# Patient Record
Sex: MALE | Race: WHITE | NOT HISPANIC OR LATINO | Employment: OTHER | URBAN - METROPOLITAN AREA
[De-identification: names, ages, dates, MRNs, and addresses within clinical notes are randomized per-mention and may not be internally consistent; named-entity substitution may affect disease eponyms.]

---

## 2020-01-28 ENCOUNTER — HISTORICAL (OUTPATIENT)
Dept: LAB | Facility: HOSPITAL | Age: 85
End: 2020-01-28

## 2023-01-26 ENCOUNTER — HOSPITAL ENCOUNTER (EMERGENCY)
Facility: HOSPITAL | Age: 88
Discharge: HOME | End: 2023-01-26
Attending: EMERGENCY MEDICINE
Payer: OTHER GOVERNMENT

## 2023-01-26 VITALS
WEIGHT: 160 LBS | DIASTOLIC BLOOD PRESSURE: 95 MMHG | RESPIRATION RATE: 20 BRPM | BODY MASS INDEX: 24.25 KG/M2 | HEIGHT: 68 IN | TEMPERATURE: 98 F | SYSTOLIC BLOOD PRESSURE: 171 MMHG | OXYGEN SATURATION: 96 % | HEART RATE: 103 BPM

## 2023-01-26 DIAGNOSIS — K62.5 RECTAL BLEEDING: ICD-10-CM
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
## 2023-01-26 DIAGNOSIS — M11.20 PSEUDOGOUT: Primary | ICD-10-CM

## 2023-01-26 LAB
ALBUMIN SERPL-MCNC: 3.9 G/DL (ref 3.4–4.8)
ALBUMIN/GLOB SERPL: 1 RATIO (ref 1.1–2)
ALP SERPL-CCNC: 54 UNIT/L (ref 40–150)
ALT SERPL-CCNC: 14 UNIT/L (ref 0–55)
APPEARANCE UR: CLEAR
AST SERPL-CCNC: 16 UNIT/L (ref 5–34)
BASOPHILS # BLD AUTO: 0.03 X10(3)/MCL (ref 0–0.2)
BASOPHILS NFR BLD AUTO: 0.3 %
BILIRUB UR QL STRIP.AUTO: NEGATIVE MG/DL
BILIRUBIN DIRECT+TOT PNL SERPL-MCNC: 1.7 MG/DL
BUN SERPL-MCNC: 10.9 MG/DL (ref 8.4–25.7)
CALCIUM SERPL-MCNC: 9.2 MG/DL (ref 8.8–10)
CHLORIDE SERPL-SCNC: 102 MMOL/L (ref 98–107)
CO2 SERPL-SCNC: 26 MMOL/L (ref 23–31)
COLOR UR AUTO: ABNORMAL
CREAT SERPL-MCNC: 1.02 MG/DL (ref 0.73–1.18)
EOSINOPHIL # BLD AUTO: 0.08 X10(3)/MCL (ref 0–0.9)
EOSINOPHIL NFR BLD AUTO: 0.8 %
ERYTHROCYTE [DISTWIDTH] IN BLOOD BY AUTOMATED COUNT: 19 % (ref 11.5–17)
GFR SERPLBLD CREATININE-BSD FMLA CKD-EPI: >60 MLS/MIN/1.73/M2
GLOBULIN SER-MCNC: 3.8 GM/DL (ref 2.4–3.5)
GLUCOSE SERPL-MCNC: 146 MG/DL (ref 82–115)
GLUCOSE UR QL STRIP.AUTO: NEGATIVE MG/DL
HCT VFR BLD AUTO: 41 % (ref 42–52)
HGB BLD-MCNC: 13.1 GM/DL (ref 14–18)
IMM GRANULOCYTES # BLD AUTO: 0.04 X10(3)/MCL (ref 0–0.04)
IMM GRANULOCYTES NFR BLD AUTO: 0.4 %
KETONES UR QL STRIP.AUTO: ABNORMAL MG/DL
LEUKOCYTE ESTERASE UR QL STRIP.AUTO: NEGATIVE UNIT/L
LYMPHOCYTES # BLD AUTO: 1.87 X10(3)/MCL (ref 0.6–4.6)
LYMPHOCYTES NFR BLD AUTO: 17.6 %
MCH RBC QN AUTO: 29.2 PG
MCHC RBC AUTO-ENTMCNC: 32 MG/DL (ref 33–36)
MCV RBC AUTO: 91.5 FL (ref 80–94)
MONOCYTES # BLD AUTO: 0.9 X10(3)/MCL (ref 0.1–1.3)
MONOCYTES NFR BLD AUTO: 8.5 %
NEUTROPHILS # BLD AUTO: 7.72 X10(3)/MCL (ref 2.1–9.2)
NEUTROPHILS NFR BLD AUTO: 72.4 %
NITRITE UR QL STRIP.AUTO: NEGATIVE
NRBC BLD AUTO-RTO: 0 %
PH UR STRIP.AUTO: 7 [PH]
PLATELET # BLD AUTO: 242 X10(3)/MCL (ref 130–400)
PMV BLD AUTO: 9.3 FL (ref 7.4–10.4)
POTASSIUM SERPL-SCNC: 3.7 MMOL/L (ref 3.5–5.1)
PROT SERPL-MCNC: 7.7 GM/DL (ref 5.8–7.6)
PROT UR QL STRIP.AUTO: NEGATIVE MG/DL
RBC # BLD AUTO: 4.48 X10(6)/MCL (ref 4.7–6.1)
RBC UR QL AUTO: NEGATIVE UNIT/L
SODIUM SERPL-SCNC: 138 MMOL/L (ref 136–145)
SP GR UR STRIP.AUTO: 1.01
UROBILINOGEN UR STRIP-ACNC: 0.2 MG/DL
WBC # SPEC AUTO: 10.6 X10(3)/MCL (ref 4.5–11.5)

## 2023-01-26 PROCEDURE — 81003 URINALYSIS AUTO W/O SCOPE: CPT

## 2023-01-26 PROCEDURE — 80053 COMPREHEN METABOLIC PANEL: CPT

## 2023-01-26 PROCEDURE — 99284 EMERGENCY DEPT VISIT MOD MDM: CPT | Mod: 25

## 2023-01-26 PROCEDURE — 85025 COMPLETE CBC W/AUTO DIFF WBC: CPT

## 2023-01-26 RX ORDER — METHYLPREDNISOLONE 4 MG/1
TABLET ORAL
Qty: 21 EACH | Refills: 0 | Status: SHIPPED | OUTPATIENT
Start: 2023-01-26 | End: 2023-02-16

## 2023-01-26 NOTE — LETTER
Patient: Lizandro Martinez  YOB: 1935  Date: 1/26/2023 Time: 6:16 PM  Location: [facility] - Emergency Dept    Leaving the Hospital Against Medical Advice    Chart #:[id number]    This will certify that I, the undersigned,    ______________________________________________________________________    A patient in the above named medical center, having requested discharge and removal from the medical center against the advice of my attending physician(s), hereby release [facility], its physicians, officers and employees, severally and individually, from any and all liability of any nature whatsoever for any injury or harm or complication of any kind that may result directly or indirectly, by reason of my terminating my stay as a patient at [facility] - Emergency Dept and my departure from [facility], and hereby waive any and all rights of action I may now have or later acquire as a result of my voluntary departure from [facility] and the termination of my stay as a patient therein.    This release is made with the full knowledge of the danger that may result from the action which I am taking.      Date:_______________________                         ___________________________                                                                                    Patient/Legal Representative    Witness:        ____________________________                          ___________________________  Nurse                                                                        Physician

## 2023-01-26 NOTE — ED PROVIDER NOTES
Encounter Date: 1/26/2023       History     Chief Complaint   Patient presents with    Fall     Pt to er c/o pain to right wrist and right rib area s/p fall two days ago. States was not weak or dizzy prior to fall.     88 y.o. White male with a history of chronic pain presents to Emergency Department with a chief complaint of R wrist pain. Symptoms began 2 days ago after fall and have been constant since onset. Patient reports he fell onto his R side. Associated symptoms include R wrist swelling, R rib pain, and rectal bleeding. States 1 episode of bright red blood in stool 2 days ago. The patient denies head injury, CP, SOB, weakness, blood thinner use, or dizziness. He reports taking Norco and Ibuprofen prior to arrival with mild relief of symptoms. No other reported symptoms at this time.       The history is provided by the patient. No  was used.   Fall  The accident occurred two days ago. The fall occurred while standing. He landed on A hard floor. There was no blood loss. The point of impact was the right wrist. The pain is present in the right wrist and chest. He was Ambulatory at the scene. There was No entrapment after the fall. There was No drug use involved in the accident. There was No alcohol use involved in the accident. Pertinent negatives include no back pain, no paresthesias, no paralysis, no visual change, no fever, no numbness, no abdominal pain, no bowel incontinence, no nausea, no vomiting, no headaches, no loss of consciousness and no tingling. The symptoms are aggravated by activity. He has tried acetaminophen and NSAIDs for the symptoms. The treatment provided mild relief.   Review of patient's allergies indicates:   Allergen Reactions    Colestipol     Meloxicam     Rosuvastatin     Simvastatin     Statins-hmg-coa reductase inhibitors     Tramadol      Past Medical History:   Diagnosis Date    Chronic pain      History reviewed. No pertinent surgical history.  No family  history on file.     Review of Systems   Constitutional:  Negative for chills, fatigue and fever.   Eyes:  Negative for photophobia and visual disturbance.   Respiratory:  Negative for shortness of breath, wheezing and stridor.    Cardiovascular:  Negative for palpitations and leg swelling.   Gastrointestinal:  Positive for anal bleeding. Negative for abdominal pain, bowel incontinence, nausea and vomiting.   Musculoskeletal:  Positive for arthralgias and joint swelling. Negative for back pain.   Neurological:  Negative for tingling, loss of consciousness, syncope, weakness, numbness, headaches and paresthesias.   All other systems reviewed and are negative.    Physical Exam     Initial Vitals [01/26/23 1613]   BP Pulse Resp Temp SpO2   (!) 171/95 103 20 97.7 °F (36.5 °C) 96 %      MAP       --         Physical Exam    Nursing note and vitals reviewed.  Constitutional: He appears well-developed and well-nourished. He is not diaphoretic. He is cooperative.  Non-toxic appearance. No distress.   HENT:   Head: Normocephalic and atraumatic.   Right Ear: External ear normal.   Left Ear: External ear normal.   Nose: Nose normal.   Mouth/Throat: Oropharynx is clear and moist. No oropharyngeal exudate.   Eyes: Conjunctivae and EOM are normal. Pupils are equal, round, and reactive to light. Right eye exhibits no discharge. Left eye exhibits no discharge.   Neck: Neck supple. No JVD present.   Normal range of motion.  Cardiovascular:  Normal rate, regular rhythm, normal heart sounds, intact distal pulses and normal pulses.           Pulmonary/Chest: Effort normal and breath sounds normal. No accessory muscle usage or stridor. No tachypnea. No respiratory distress. He has no wheezes. He exhibits tenderness (R chest).     Abdominal: Abdomen is soft. Bowel sounds are normal. He exhibits no distension. There is no abdominal tenderness. There is no guarding.   Genitourinary:    Genitourinary Comments: Patient declined.       Musculoskeletal:         General: Tenderness and edema present. Normal range of motion.      Right wrist: Swelling and tenderness present. No deformity. Normal pulse.      Left wrist: Normal.      Cervical back: Normal range of motion and neck supple.      Comments: R wrist tenderness, swelling, and redness/bruising noted. All other adjacent joints otherwise normal. CMS intact.      Neurological: He is alert and oriented to person, place, and time. He has normal strength. No sensory deficit. GCS score is 15. GCS eye subscore is 4. GCS verbal subscore is 5. GCS motor subscore is 6.   Skin: Skin is warm and dry. Capillary refill takes less than 2 seconds. No rash noted. There is erythema.   Psychiatric: He has a normal mood and affect. Thought content normal.       ED Course   Procedures  Labs Reviewed   COMPREHENSIVE METABOLIC PANEL - Abnormal; Notable for the following components:       Result Value    Glucose Level 146 (*)     Protein Total 7.7 (*)     Globulin 3.8 (*)     Albumin/Globulin Ratio 1.0 (*)     Bilirubin Total 1.7 (*)     All other components within normal limits   URINALYSIS, REFLEX TO URINE CULTURE - Abnormal; Notable for the following components:    Ketones, UA Trace (*)     All other components within normal limits   CBC WITH DIFFERENTIAL - Abnormal; Notable for the following components:    RBC 4.48 (*)     Hgb 13.1 (*)     Hct 41.0 (*)     MCHC 32.0 (*)     RDW 19.0 (*)     All other components within normal limits   CBC W/ AUTO DIFFERENTIAL    Narrative:     The following orders were created for panel order CBC auto differential.  Procedure                               Abnormality         Status                     ---------                               -----------         ------                     CBC with Differential[[id number]]        Abnormal            Final result                 Please view results for these tests on the individual orders.   OCCULT BLOOD,STOOL,DIAGNOSTIC (1-3)     Narrative:     The following orders were created for panel order Occult Blood, Stool, Diagnostic (1-3).  Procedure                               Abnormality         Status                     ---------                               -----------         ------                     Occult blood x 3, stool[[id number]]                                                     Occult Blood, Stool 2nd ...[[id number]]                                                   Please view results for these tests on the individual orders.   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  This can be seen with CPPD.    Chronic appearing remodeling at the scaphoid.      Electronically signed by: Candace Funez  Date:    01/26/2023  Time:    16:51               Narrative:    EXAMINATION:  XR WRIST COMPLETE 3 VIEWS RIGHT    CLINICAL HISTORY:  Unspecified fall, initial encounter    TECHNIQUE:  PA, lateral, and oblique views of the right wrist were performed.    COMPARISON:  None.    FINDINGS:  No appreciable fracture.  There is chronic appearing remodeling of the scaphoid.  There is chondrocalcinosis at the radiocarpal joint and TFCC.    Soft tissue swelling.                                       Medications - No data to display  Medical Decision Making:   Initial Assessment:   Patient awake, alert, has non-labored breathing, and follows commands. Tenderness, erythema, and mild swelling noted to R wrist. 4/5 ROM noted. CMS intact. Also c/o R rib tenderness.   Differential Diagnosis:   Wrist Pain, Fall, Injury, Rib fracture, Rib Pain, Rectal Bleeding  Clinical Tests:   Lab Tests: Ordered and Reviewed  The following lab test(s) were unremarkable: CBC, CMP and Urinalysis  Radiological Study: Ordered  and Reviewed  ED Management:  Co-morbidities and/or factors adding to the complexity or risk for the patient?: patient's age and recent injury.  Differential diagnoses: Wrist Pain, Fall, Injury, Rib fracture, Rib Pain, Rectal Bleeding  Decision to obtain previous or outside records?: I did not obtain previous or outside records.  Chart Review (nursing home, outside records, CareEverywhere): none  Review of RX medications/new RX prescribed by me?: Patient currently taking Ibuprofen and Norco for pain at home. Reports prescribed Norco for chronic pain. Prescribed medrol dose pack for patent's diagnosis of pseudogout. Cautioned patient on use of Ibuprofen and Norco and side effects.   Labs/imaging/other tests obtained/considered (risk/benefits of testing discussed): cbc, cmp, ua, hemoccult, wrist xray, rib/chest xray.   Labs/tests intepretation: labs that were collected were unremarkable. Patient declined hemoccult study and rib/chest xray. 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 This can be seen with CPPD. Chronic appearing remodeling at the scaphoid. Informed patient of findings and educated patient on reasoning of ordering tests that he declined. Patient refused.   My independent imaging interpretation: none  Treatment/interventions, IV fluids, IV medications: none  Complex management (IV controlled substances, went to OR, DNR, meds requiring monitoring, transfer, etc)?: none  Workup/treatment affected by social determinants of health?:none   Point of care US done/interpretation: none  Consults/radiologist/EMS/social work/family discussion/alternate history: none  Advanced care planning/end of life discussion: none  Shared decision making: Discussed plan of care and interventions with patient. Patient aware of plan of care. Comfortable being discharged home.   ETOH/smoking/drug cessation discussion: none  Dispo: Patient discharged home. Patient denies new or additional complaints; no further tests indicated at  "this time. Verbalized understanding of instructions. No emergent or apparent distress noted prior to discharge. To follow up with PCP in 1 week as needed. Strict ER return precautions given.                ED Course as of 01/26/23 1837   u Jan 26, 2023   1700 Patient refused rib/chest xray; reports its a waste of time. Explicated reasoning for ordering test, patient reports he does not need it. GCS 15. [JA]   1739 Refused hemoccult stool collection; reports "I handled that this morning".  [JA]   1815 Spoke with Dr. Malone regarding patient's xray results and complaints. Since patient unable to tolerate NSAIDs due to age; agreed that medrol dose pack appropriate since no history of T2DM.  [JA]      ED Course User Index  [JA] Bailey Ramesh NP                 Clinical Impression:   Final diagnoses:  [W19.XXXA] Fall  [M11.20] Pseudogout (Primary)  [K62.5] Rectal bleeding        ED Disposition Condition    Discharge Stable          ED Prescriptions       Medication Sig Dispense Start Date End Date Auth. Provider    methylPREDNISolone (MEDROL DOSEPACK) 4 mg tablet use as directed 21 each 1/26/2023 2/16/2023 Bailey Ramesh NP          Follow-up Information       Follow up With Specialties Details Why Contact Info    [facility]  Call in 1 week If symptoms worsen, As needed [id number]  [facility] [id number]  [phone]      [facility] - Emergency Dept Emergency Medicine Go to  If symptoms worsen, As needed [id number]  [facility] [id number] [phone]             Bailey Ramesh NP  01/26/23 1837    "

## 2023-01-26 NOTE — ED TRIAGE NOTES
Pt to er c/o pain to right wrist and right rib area s/p fall two days ago. States was not weak or dizzy prior to fall.

## 2023-01-27 NOTE — DISCHARGE INSTRUCTIONS
Thanks for letting us take care of you today!  It is our goal to give you courteous care and to keep you comfortable and informed, if you have any questions before you leave I will be happy to try and answer them.    Here is some advice after your visit:      Your visit in the emergency department is NOT definitive care - please follow-up with your primary care doctor and/or specialist within 1 week.  Please return if you have any worsening in your condition or if you have any other concerns.    If you had radiology exams like an XRAY or CT in the emergency Department the interpreation on them may be preliminary - there may be less time sensitive findings on the reports please obtain these reports within 24 hours from the hospital or by using your out on your mobile phone to access records.  Bring these to your primary care doctor and/or specialist for further review of incidental findings.    Please review any LAB WORK from your visit today with your primary care physician.

## 2023-02-13 ENCOUNTER — NURSE TRIAGE (OUTPATIENT)
Dept: ADMINISTRATIVE | Facility: CLINIC | Age: 88
End: 2023-02-13
Payer: OTHER GOVERNMENT

## 2023-02-13 NOTE — TELEPHONE ENCOUNTER
Pt would like follow up with provider regarding gout dx. I have advised him to follow up with his PCP in this regard and he does verbalizes understanding but he would like follow up with ED provider.  Reason for Disposition   Information only question and nurse able to answer    Protocols used: Information Only Call - No Triage-A-[place]

## 2023-08-03 ENCOUNTER — HOSPITAL ENCOUNTER (EMERGENCY)
Facility: HOSPITAL | Age: 88
Discharge: LEFT BEFORE DISCHARGE | End: 2023-08-03
Attending: INTERNAL MEDICINE
Payer: OTHER GOVERNMENT

## 2023-08-03 VITALS
OXYGEN SATURATION: 96 % | DIASTOLIC BLOOD PRESSURE: 106 MMHG | HEIGHT: 70 IN | TEMPERATURE: 98 F | BODY MASS INDEX: 24.34 KG/M2 | HEART RATE: 106 BPM | SYSTOLIC BLOOD PRESSURE: 166 MMHG | WEIGHT: 170 LBS | RESPIRATION RATE: 20 BRPM

## 2023-08-03 DIAGNOSIS — I48.91 RAPID ATRIAL FIBRILLATION: Primary | ICD-10-CM

## 2023-08-03 DIAGNOSIS — I50.41 ACUTE COMBINED SYSTOLIC AND DIASTOLIC CONGESTIVE HEART FAILURE: ICD-10-CM

## 2023-08-03 DIAGNOSIS — R06.02 SHORTNESS OF BREATH: ICD-10-CM

## 2023-08-03 LAB
ALBUMIN SERPL-MCNC: 3.1 G/DL (ref 3.4–4.8)
ALBUMIN/GLOB SERPL: 0.8 RATIO (ref 1.1–2)
ALP SERPL-CCNC: 58 UNIT/L (ref 40–150)
ALT SERPL-CCNC: 16 UNIT/L (ref 0–55)
AST SERPL-CCNC: 19 UNIT/L (ref 5–34)
BASOPHILS # BLD AUTO: 0.03 X10(3)/MCL
BASOPHILS NFR BLD AUTO: 0.3 %
BILIRUBIN DIRECT+TOT PNL SERPL-MCNC: 0.9 MG/DL
BNP BLD-MCNC: 315.2 PG/ML
BUN SERPL-MCNC: 10 MG/DL (ref 8.4–25.7)
CALCIUM SERPL-MCNC: 9.1 MG/DL (ref 8.8–10)
CHLORIDE SERPL-SCNC: 100 MMOL/L (ref 98–107)
CO2 SERPL-SCNC: 28 MMOL/L (ref 23–31)
CREAT SERPL-MCNC: 1.1 MG/DL (ref 0.73–1.18)
EOSINOPHIL # BLD AUTO: 0.02 X10(3)/MCL (ref 0–0.9)
EOSINOPHIL NFR BLD AUTO: 0.2 %
ERYTHROCYTE [DISTWIDTH] IN BLOOD BY AUTOMATED COUNT: 15.1 % (ref 11.5–17)
ETHANOL SERPL-MCNC: <10 MG/DL
GFR SERPLBLD CREATININE-BSD FMLA CKD-EPI: >60 MLS/MIN/1.73/M2
GLOBULIN SER-MCNC: 4 GM/DL (ref 2.4–3.5)
GLUCOSE SERPL-MCNC: 126 MG/DL (ref 82–115)
HCT VFR BLD AUTO: 39.1 % (ref 42–52)
HGB BLD-MCNC: 12.2 G/DL (ref 14–18)
IMM GRANULOCYTES # BLD AUTO: 0.03 X10(3)/MCL (ref 0–0.04)
IMM GRANULOCYTES NFR BLD AUTO: 0.3 %
LYMPHOCYTES # BLD AUTO: 1.17 X10(3)/MCL (ref 0.6–4.6)
LYMPHOCYTES NFR BLD AUTO: 13.6 %
MCH RBC QN AUTO: 27.1 PG (ref 27–31)
MCHC RBC AUTO-ENTMCNC: 31.2 G/DL (ref 33–36)
MCV RBC AUTO: 86.7 FL (ref 80–94)
MONOCYTES # BLD AUTO: 0.87 X10(3)/MCL (ref 0.1–1.3)
MONOCYTES NFR BLD AUTO: 10.1 %
NEUTROPHILS # BLD AUTO: 6.46 X10(3)/MCL (ref 2.1–9.2)
NEUTROPHILS NFR BLD AUTO: 75.5 %
PLATELET # BLD AUTO: 440 X10(3)/MCL (ref 130–400)
PMV BLD AUTO: 9 FL (ref 7.4–10.4)
POTASSIUM SERPL-SCNC: 4.6 MMOL/L (ref 3.5–5.1)
PROT SERPL-MCNC: 7.1 GM/DL (ref 5.8–7.6)
RBC # BLD AUTO: 4.51 X10(6)/MCL (ref 4.7–6.1)
SODIUM SERPL-SCNC: 136 MMOL/L (ref 136–145)
TROPONIN I SERPL-MCNC: 0.03 NG/ML (ref 0–0.04)
WBC # SPEC AUTO: 8.58 X10(3)/MCL (ref 4.5–11.5)

## 2023-08-03 PROCEDURE — 83880 ASSAY OF NATRIURETIC PEPTIDE: CPT | Performed by: INTERNAL MEDICINE

## 2023-08-03 PROCEDURE — 84484 ASSAY OF TROPONIN QUANT: CPT | Performed by: INTERNAL MEDICINE

## 2023-08-03 PROCEDURE — 96374 THER/PROPH/DIAG INJ IV PUSH: CPT

## 2023-08-03 PROCEDURE — 63600175 PHARM REV CODE 636 W HCPCS: Performed by: INTERNAL MEDICINE

## 2023-08-03 PROCEDURE — 80053 COMPREHEN METABOLIC PANEL: CPT | Performed by: INTERNAL MEDICINE

## 2023-08-03 PROCEDURE — 25000003 PHARM REV CODE 250: Performed by: INTERNAL MEDICINE

## 2023-08-03 PROCEDURE — 99285 EMERGENCY DEPT VISIT HI MDM: CPT | Mod: 25

## 2023-08-03 PROCEDURE — 93010 EKG 12-LEAD: ICD-10-PCS | Mod: ,,, | Performed by: INTERNAL MEDICINE

## 2023-08-03 PROCEDURE — 82077 ASSAY SPEC XCP UR&BREATH IA: CPT | Performed by: INTERNAL MEDICINE

## 2023-08-03 PROCEDURE — 85025 COMPLETE CBC W/AUTO DIFF WBC: CPT | Performed by: INTERNAL MEDICINE

## 2023-08-03 PROCEDURE — 93010 ELECTROCARDIOGRAM REPORT: CPT | Mod: ,,, | Performed by: INTERNAL MEDICINE

## 2023-08-03 RX ORDER — METOPROLOL SUCCINATE 25 MG/1
25 TABLET, EXTENDED RELEASE ORAL DAILY
Qty: 30 TABLET | Refills: 11 | Status: ON HOLD | OUTPATIENT
Start: 2023-08-03 | End: 2023-08-14 | Stop reason: HOSPADM

## 2023-08-03 RX ORDER — FUROSEMIDE 10 MG/ML
20 INJECTION INTRAMUSCULAR; INTRAVENOUS
Status: COMPLETED | OUTPATIENT
Start: 2023-08-03 | End: 2023-08-03

## 2023-08-03 RX ORDER — FUROSEMIDE 20 MG/1
20 TABLET ORAL 2 TIMES DAILY
Qty: 60 TABLET | Refills: 11 | Status: ON HOLD | OUTPATIENT
Start: 2023-08-03 | End: 2023-08-06 | Stop reason: SDUPTHER

## 2023-08-03 RX ORDER — METOPROLOL TARTRATE 25 MG/1
25 TABLET, FILM COATED ORAL
Status: COMPLETED | OUTPATIENT
Start: 2023-08-03 | End: 2023-08-03

## 2023-08-03 RX ADMIN — METOPROLOL TARTRATE 25 MG: 25 TABLET, FILM COATED ORAL at 03:08

## 2023-08-03 RX ADMIN — FUROSEMIDE 20 MG: 10 INJECTION, SOLUTION INTRAMUSCULAR; INTRAVENOUS at 03:08

## 2023-08-03 NOTE — ED PROVIDER NOTES
Encounter Date: 8/3/2023  History from patient     History     Chief Complaint   Patient presents with    Shortness of Breath     Pt reports sob for a year with a productive cough. Reports his stamina has decreased and losing weight as well     HPI    88 y.o. male  has a past medical history of Chronic pain and Hypercholesterolemia.  Shortness of Breath (Pt reports sob for a year with a productive cough. Reports his stamina has decreased and losing weight as well)     Review of patient's allergies indicates:   Allergen Reactions    Colestipol     Meloxicam     Rosuvastatin     Simvastatin     Statins-hmg-coa reductase inhibitors     Tramadol      Past Medical History:   Diagnosis Date    Chronic pain     Hypercholesterolemia      Past Surgical History:   Procedure Laterality Date    TIBIA FRACTURE SURGERY Right      Family History   Problem Relation Age of Onset    Hypertension Mother     Heart disease Mother     Hyperlipidemia Mother     Cancer Mother     COPD Mother     Dementia Father      Social History     Tobacco Use    Smoking status: Never    Smokeless tobacco: Never   Substance Use Topics    Alcohol use: Yes     Comment: Has cut down from 6 to a couple of beer now    Drug use: Never     Review of Systems   HENT:  Negative for trouble swallowing and voice change.    Eyes:  Negative for visual disturbance.   Respiratory:  Positive for shortness of breath. Negative for cough.    Cardiovascular:  Negative for chest pain.   Gastrointestinal:  Negative for abdominal pain, diarrhea and vomiting.   Genitourinary:  Negative for dysuria and hematuria.   Musculoskeletal:  Negative for gait problem.        No Pain.   Skin:  Negative for color change and rash.   Neurological:  Negative for headaches.   Psychiatric/Behavioral:  Negative for behavioral problems and sleep disturbance.    All other systems reviewed and are negative.      Physical Exam     Initial Vitals [08/03/23 1146]   BP Pulse Resp Temp SpO2   (!) 172/98  90 20 98.2 °F (36.8 °C) 96 %      MAP       --         Physical Exam    Nursing note and vitals reviewed.  Constitutional: He appears well-developed. No distress.   HENT:   Head: Atraumatic.   Eyes: EOM are normal.   Neck: Neck supple.   Cardiovascular:  Normal heart sounds.           Irregular rhythm   Pulmonary/Chest: Breath sounds normal. No respiratory distress. He has no wheezes. He has no rales.   Abdominal: Abdomen is soft. Bowel sounds are normal. He exhibits no distension. There is no abdominal tenderness.   Musculoskeletal:         General: Edema present. Normal range of motion.      Cervical back: Neck supple. No bony tenderness.     Neurological: He is alert and oriented to person, place, and time. GCS score is 15. GCS eye subscore is 4. GCS verbal subscore is 5. GCS motor subscore is 6.   Speech Normal   Skin: Skin is dry.   Psychiatric: He has a normal mood and affect.   Pleasant         ED Course   Procedures    Orders Placed This Encounter   Procedures    X-ray Chest AP Portable    Comprehensive metabolic panel    CBC auto differential    Troponin I    Brain natriuretic peptide    CBC with Differential    Ethanol    Inpatient consult to Telemedicine-Card    EKG 12-LEAD    Insert saline lock     Medications   furosemide injection 20 mg (20 mg Intravenous Given 8/3/23 1501)   metoprolol tartrate (LOPRESSOR) tablet 25 mg (25 mg Oral Given 8/3/23 1501)     Admission on 08/03/2023, Discharged on 08/03/2023   Component Date Value Ref Range Status    Sodium Level 08/03/2023 136  136 - 145 mmol/L Final    Potassium Level 08/03/2023 4.6  3.5 - 5.1 mmol/L Final    Chloride 08/03/2023 100  98 - 107 mmol/L Final    Carbon Dioxide 08/03/2023 28  23 - 31 mmol/L Final    Glucose Level 08/03/2023 126 (H)  82 - 115 mg/dL Final    Blood Urea Nitrogen 08/03/2023 10.0  8.4 - 25.7 mg/dL Final    Creatinine 08/03/2023 1.10  0.73 - 1.18 mg/dL Final    Calcium Level Total 08/03/2023 9.1  8.8 - 10.0 mg/dL Final    Protein  Total 08/03/2023 7.1  5.8 - 7.6 gm/dL Final    Albumin Level 08/03/2023 3.1 (L)  3.4 - 4.8 g/dL Final    Globulin 08/03/2023 4.0 (H)  2.4 - 3.5 gm/dL Final    Albumin/Globulin Ratio 08/03/2023 0.8 (L)  1.1 - 2.0 ratio Final    Bilirubin Total 08/03/2023 0.9  <=1.5 mg/dL Final    Alkaline Phosphatase 08/03/2023 58  40 - 150 unit/L Final    Alanine Aminotransferase 08/03/2023 16  0 - 55 unit/L Final    Aspartate Aminotransferase 08/03/2023 19  5 - 34 unit/L Final    eGFR 08/03/2023 >60  mls/min/1.73/m2 Final    Troponin-I 08/03/2023 0.026  0.000 - 0.045 ng/mL Final    Natriuretic Peptide 08/03/2023 315.2 (H)  <=100.0 pg/mL Final    WBC 08/03/2023 8.58  4.50 - 11.50 x10(3)/mcL Final    RBC 08/03/2023 4.51 (L)  4.70 - 6.10 x10(6)/mcL Final    Hgb 08/03/2023 12.2 (L)  14.0 - 18.0 g/dL Final    Hct 08/03/2023 39.1 (L)  42.0 - 52.0 % Final    MCV 08/03/2023 86.7  80.0 - 94.0 fL Final    MCH 08/03/2023 27.1  27.0 - 31.0 pg Final    MCHC 08/03/2023 31.2 (L)  33.0 - 36.0 g/dL Final    RDW 08/03/2023 15.1  11.5 - 17.0 % Final    Platelet 08/03/2023 440 (H)  130 - 400 x10(3)/mcL Final    MPV 08/03/2023 9.0  7.4 - 10.4 fL Final    Neut % 08/03/2023 75.5  % Final    Lymph % 08/03/2023 13.6  % Final    Mono % 08/03/2023 10.1  % Final    Eos % 08/03/2023 0.2  % Final    Basophil % 08/03/2023 0.3  % Final    Lymph # 08/03/2023 1.17  0.6 - 4.6 x10(3)/mcL Final    Neut # 08/03/2023 6.46  2.1 - 9.2 x10(3)/mcL Final    Mono # 08/03/2023 0.87  0.1 - 1.3 x10(3)/mcL Final    Eos # 08/03/2023 0.02  0 - 0.9 x10(3)/mcL Final    Baso # 08/03/2023 0.03  <=0.2 x10(3)/mcL Final    IG# 08/03/2023 0.03  0 - 0.04 x10(3)/mcL Final    IG% 08/03/2023 0.3  % Final    Ethanol Level 08/03/2023 <10.0  <=10.0 mg/dL Final       Labs Reviewed   COMPREHENSIVE METABOLIC PANEL - Abnormal; Notable for the following components:       Result Value    Glucose Level 126 (*)     Albumin Level 3.1 (*)     Globulin 4.0 (*)     Albumin/Globulin Ratio 0.8 (*)     All  other components within normal limits   B-TYPE NATRIURETIC PEPTIDE - Abnormal; Notable for the following components:    Natriuretic Peptide 315.2 (*)     All other components within normal limits   CBC WITH DIFFERENTIAL - Abnormal; Notable for the following components:    RBC 4.51 (*)     Hgb 12.2 (*)     Hct 39.1 (*)     MCHC 31.2 (*)     Platelet 440 (*)     All other components within normal limits   TROPONIN I - Normal   ALCOHOL,MEDICAL (ETHANOL) - Normal   CBC W/ AUTO DIFFERENTIAL    Narrative:     The following orders were created for panel order CBC auto differential.  Procedure                               Abnormality         Status                     ---------                               -----------         ------                     CBC with Differential[[id number]]        Abnormal            Final result                 Please view results for these tests on the individual orders.        ECG Results              EKG 12-LEAD (Preliminary result)  Result time 08/03/23 12:45:30      Wet Read by Froylan Schwartz MD (08/03/23 12:45:30, [facility] - Emergency Dept, Emergency Medicine)    EKG: Independently reviewed and / or Interpreted by Froylan Schwartz MD. independently as Atrial fibrillation, rate 107, RBBB, No STEMI, Q-waves in anterior and septal leads,, Left Axis Deviation                                   Imaging Results              X-ray Chest AP Portable (Final result)  Result time 08/03/23 14:13:16      Final result by Ceasar Pang MD (08/03/23 14:13:16)                   Impression:      1. Cardiomegaly  2. Obscured left hemidiaphragm suspicious for left basilar infiltrate and/or pleural reaction  3. Hazy interstitial opacities throughout the lungs bilaterally which may represent interstitial pulmonary edema versus chronic interstitial changes.  Clinical correlation is indicated  4. Atherosclerosis      Electronically signed by: Ceasar  Bird  Date:    08/03/2023  Time:    14:13               Narrative:    EXAMINATION:  XR CHEST AP PORTABLE    CLINICAL HISTORY:  Shortness of breath;, .    COMPARISON:  None available    FINDINGS:  An AP view or more reveals the heart to be enlarged.  The trachea is midline.  Mild hazy interstitial opacities evident the lungs bilaterally diffusely throughout.  The left hemidiaphragm is obscured.  Degenerative changes are evident at the thoracic spine.  Atherosclerosis is seen within the aorta.                                       Medications   furosemide injection 20 mg (20 mg Intravenous Given 8/3/23 1501)   metoprolol tartrate (LOPRESSOR) tablet 25 mg (25 mg Oral Given 8/3/23 1501)     Medical Decision Making:   Initial Assessment:   88 y.o. male  has a past medical history of Chronic pain and Hypercholesterolemia.  Shortness of Breath (Pt reports sob for a year with a productive cough. Reports his stamina has decreased and losing weight as well)       Patient essentially comes to the emergency room saying that he has been having shortness of breath which is getting worse over the last year or so, some time he gets coughing spells, and he is bringing up some white sputum, denies any fever, he says he does not smoke, he says he has cut down his beer from 6+ cancer day to hardly to cancer day and is drinking more water because he believes that that will take care of the swelling on his feet, he says last year he called 1 of his doctor friends who called in some medicine for him and he took it but the cough never got better and shortness of breath is getting worse and they were the last 6 months it has gotten bad so he today he decided to come to the emergency room to find out the reason for his ongoing shortness of breath which is worsening.  Clinical Tests:   Lab Tests: Ordered and Reviewed  Radiological Study: Ordered and Reviewed  Medical Tests: Ordered and Reviewed             ED Course as of 08/03/23 0098    u Aug 03, 2023   1311 Patient got up and his heart went to 140, I went to talk to him in the room, he is standing by the bedside and he is telling me that he is ready to go home and I advised him that his heart is beating too fast he had refused to let the nurse put the IV in, I said let me put the IV in and give it some medicine to keep your heart rate down, [GQ]   1312 Patient refuses to get into the bed, and he tells me that he is not worried about his heart he is more worried about his lungs and he feels that his lungs are plugged up and I advised him that because his heart is failing which is causing the fluid into the lungs which is causing him to have coughing spells and I feel that the he needs to let me take care of his heart.  After a prolonged discussion patient told me what can be done about the heart and I advised him that I can not give him some medicine to bring his heart rate down and then he will have to keep taking the medication and I will have to have a cardiologist to see him, and patient tells me that he does not have time for all this Bull shit he has to take care of other problems, and I asked him that what is more important in life then his own life and patient tells me that he is 88 years old and he has already left his eye life and he does not care that much about that but he has to take care of other stuff.  I advised him that just let me take care of his heart beating fast so that he can come out of the heart failure I showed him his x-ray I showed him a normal x-ray of the lung then he asked me what medicine I am going to give him when I advised him he says what are the side effects of the medication do think that medicines going to shut my liver down at at that time I told him that the beer which he drinks about 6 pack a day is going to cause the liver shut down more faster than the medication I am going to prescribe him for the heart and at that patient tells me that the he knows  a lot more older drunk people then the doctors and doctors should keep the nose where it belongs, then I had to leave the room and the nurses kept on talking to him and will trying to convince him to let them put the IV on him, [GQ]   1319 Patient says that he has a 16-year-old granddaughter sitting at home and he has to take care of that and he can not definitely stay in the hospital and he wants to sign out against medical advise and he says he can call his doctor in [place] to get something from him to treat him.  His heart is beating at 1:40 a.m. and is standing up ready to go home although he is maintaining his oxygen saturation up to 97% on room air, he says my main problem is that my lungs are plugged up and I advised him that that is what I am trying to do to fix his heart to fix his lungs and I explained the whole physiology to him and he says that if I can give him some diuretic he can probably take care of it at home himself.  And I advised him that that is not safe because his heart is already beating at 1:40 a.m. and 150 he takes low diuretic on top of that it might cause more problem he really needs to have medicines to bring his heart rate down to keep him from grating into heart failure which already is a problem for him.  At last patient says that he is just going to sit and make some phone calls and then he is going to decide as to if he is going home or he is staying. [GQ]   7558 Patient told me that he is going to make some phone calls and then he will decide, then he told the nursing supervisor that okay he is going to stay but he does not want any saline in him and he does not want any of that COVID-19 stuff.  We advised him that we are not going to give him any saline because he is in heart failure already. [GQ]   1945 We are going to put the IV on him now and give him a small dose of Lasix and beta-blocker. [GQ]   8536 TOMAS Stokes NP saw pt. Pt. Says he is not going to take any medicine  from them, he would take Thu Brice, and wants to know if that is OK, he was told that we are not qualified to know what [facility] will do, He recommended to give prescription of Metoprolol and Lasix and he needs to be on a blood thinner, but pt. Refuses, so he can go home and they will call from [facility] to convince him to come to office for followup. [GQ]   1500 Although patient refused to stay in the hospital I will try to convince him to take the beta-blocker in fluid pill at least to at least control the heart rate and expect that he will improve, and then I will let him sign out against medical advice. 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

## 2023-08-03 NOTE — CONSULTS
Inpatient consult to Telemedicine-Card  Consult performed by: Manpreet Stokes AGACNP-BC  Consult ordered by: Froylan Schwartz MD  Reason for consult: new onset Afib with RVR          [facility] - Emergency Dept  Telecardiology  Consult Note    Patient Name: Lizandro Martinez  MRN: [id number]  Admission Date: 8/3/2023  Hospital Length of Stay: 0 days  Code Status: No Order   Attending Provider: Froylan Schwartz MD   [facility]  [facility]  Principal Problem:<principal problem not specified>    Patient information was obtained from patient, past medical records, and ER records.     Subjective:     Chief Complaint:  Consulted for new onset Afib     HPI:   This is an 88 year old male, who is unknown to [facility], with a history of HTN, HLD, chronic pain. He presented to the ER on 8.3.23 with complaints of SOB x 1 year that has progressively worsened over the past 2-3 months. He was very agitated and was not able to provide very much history. He stated he only takes macario buckley and that he has got to 88 by avoiding doctors and hospitals.  He was found to be in Afib with mild RVR on tele.  He was given Metoprolol 25mg and Lasix 40mg IV x 1 after finally being convinced to have and IV. He stated that he does not want to stay in the hospital and will only take pills but not a blood thinner. When explaining the risks of not taking anticoagulation, he then again became agitated. HR is currently controlled and BP remains elevated. [facility] has been consulted for Afib with RVR.      PMH: HTN, HLD, chronic pain  PSH: Repair of tibia fracture  Social History: Current EtOH use (approximately 6 pack of beer per day), denies tobacco and illicit drug use  Family History:  Mother-HTN, heart disease, HLD, Cancer, COPD; Father-dementia    Previous Cardiac Diagnostics:   No previous cardiac diagnostics available for review.      Review of patient's allergies  indicates:   Allergen Reactions    Colestipol     Meloxicam     Rosuvastatin     Simvastatin     Statins-hmg-coa reductase inhibitors     Tramadol        No current facility-administered medications on file prior to encounter.     No current outpatient medications on file prior to encounter.       Review of Systems:  Review of Systems   Constitutional: Negative.    HENT: Negative.     Eyes: Negative.    Respiratory:  Positive for cough and shortness of breath.    Cardiovascular: Negative.    Gastrointestinal: Negative.    Endocrine: Negative.    Genitourinary: Negative.    Musculoskeletal: Negative.    Skin: Negative.    Allergic/Immunologic: Negative.    Neurological: Negative.    Hematological: Negative.    Psychiatric/Behavioral: Negative.         Objective:     Vital Signs (Most Recent):  Temp: 98.2 °F (36.8 °C) (08/03/23 1146)  Pulse: 100 (08/03/23 1448)  Resp: 19 (08/03/23 1448)  BP: (!) 174/114 (08/03/23 1448)  SpO2: (!) 91 % (08/03/23 1448) Vital Signs (24h Range):  Temp:  [98.2 °F (36.8 °C)] 98.2 °F (36.8 °C)  Pulse:  [] 100  Resp:  [19-23] 19  SpO2:  [91 %-97 %] 91 %  BP: (170-174)/() 174/114     Weight: 77.1 kg (170 lb)  Body mass index is 24.39 kg/m².    SpO2: (!) 91 %       No intake or output data in the 24 hours ending 08/03/23 1459    Lines/Drains/Airways       Peripheral Intravenous Line  Duration                  Peripheral IV - Single Lumen 08/03/23 1444 20 G Anterior;Left;Proximal Forearm <1 day                      Significant Labs:   Recent Lab Results         08/03/23  1216        Albumin/Globulin Ratio 0.8       Albumin 3.1       Alcohol, Serum <10.0       Alkaline Phosphatase 58       ALT 16       AST 19       Baso # 0.03       Basophil % 0.3       BILIRUBIN TOTAL 0.9       .2       BUN 10.0       Calcium 9.1       Chloride 100       CO2 28       Creatinine 1.10       eGFR >60       Eos # 0.02       Eosinophil % 0.2       Globulin, Total 4.0       Glucose 126        Hematocrit 39.1       Hemoglobin 12.2       Immature Grans (Abs) 0.03       Immature Granulocytes 0.3       Lymph # 1.17       LYMPH % 13.6       MCH 27.1       MCHC 31.2       MCV 86.7       Mono # 0.87       Mono % 10.1       MPV 9.0       Neut # 6.46       Neut % 75.5       Platelets 440       Potassium 4.6       PROTEIN TOTAL 7.1       RBC 4.51       RDW 15.1       Sodium 136       Troponin I 0.026       WBC 8.58                 Significant Imaging:   Imaging Results              X-ray Chest AP Portable (Final result)  Result time 08/03/23 14:13:16      Final result by Ceasar Pang MD (08/03/23 14:13:16)                   Impression:      1. Cardiomegaly  2. Obscured left hemidiaphragm suspicious for left basilar infiltrate and/or pleural reaction  3. Hazy interstitial opacities throughout the lungs bilaterally which may represent interstitial pulmonary edema versus chronic interstitial changes.  Clinical correlation is indicated  4. Atherosclerosis      Electronically signed by: Ceasar Pang  Date:    08/03/2023  Time:    14:13               Narrative:    EXAMINATION:  XR CHEST AP PORTABLE    CLINICAL HISTORY:  Shortness of breath;, .    COMPARISON:  None available    FINDINGS:  An AP view or more reveals the heart to be enlarged.  The trachea is midline.  Mild hazy interstitial opacities evident the lungs bilaterally diffusely throughout.  The left hemidiaphragm is obscured.  Degenerative changes are evident at the thoracic spine.  Atherosclerosis is seen within the aorta.                                        EKG:        Telemetry:  Afib with CVR    Physical Exam:  Physical Exam  HENT:      Head: Atraumatic.   Eyes:      Extraocular Movements: Extraocular movements intact.   Cardiovascular:      Rate and Rhythm: Normal rate. Rhythm irregular.      Pulses: Normal pulses.   Pulmonary:      Effort: Pulmonary effort is normal.   Neurological:      General: No focal deficit present.      Mental Status: He  "is alert. Mental status is at baseline.   Psychiatric:         Mood and Affect: Affect is angry.         Behavior: Behavior is uncooperative and agitated.         Home Medications:   No current facility-administered medications on file prior to encounter.     No current outpatient medications on file prior to encounter.       Current Inpatient Medications:    Current Facility-Administered Medications:     furosemide injection 20 mg, 20 mg, Intravenous, ED 1 Time, Froylan Schwartz MD    metoprolol tartrate (LOPRESSOR) tablet 25 mg, 25 mg, Oral, ED 1 Time, Froylan Schwartz MD  No current outpatient medications on file.           Assessment:     IMPRESSION:  Newly diagnosed Afib with RVR, now with CVR  -Did not feel palpitations  -[id number] 3 (age, HTN)  Shortness of breath  -has been present for about 1 year, has worsened over the past few months  HTN, uncontrolled  HLD  -Statin allergy  Daily EtOH use  Chronic pain        PLAN:     PLAN:  Start Toprol XL 25mg daily  Start Lasix 40mg daily  Recommend Eliquis 5mg BID for CVA prophylaxis (states he does not want this, discussed risks of CVA and he became agitated "I don't need to be guilted into taking medicines")  F/U with [facility].    Thank you for your consult.     JOSE Oseguera-BC  Cardiology  [facility] - Emergency Dept  08/03/2023 2:59 PM    "

## 2023-08-04 ENCOUNTER — HOSPITAL ENCOUNTER (INPATIENT)
Facility: HOSPITAL | Age: 88
LOS: 2 days | Discharge: HOME | DRG: 291 | End: 2023-08-06
Attending: EMERGENCY MEDICINE | Admitting: INTERNAL MEDICINE
Payer: OTHER GOVERNMENT

## 2023-08-04 DIAGNOSIS — R07.9 CHEST PAIN: ICD-10-CM

## 2023-08-04 DIAGNOSIS — I48.91 ATRIAL FIBRILLATION: ICD-10-CM

## 2023-08-04 DIAGNOSIS — I50.9 ACUTE CHF (CONGESTIVE HEART FAILURE): ICD-10-CM

## 2023-08-04 LAB
ALBUMIN SERPL-MCNC: 3.3 G/DL (ref 3.4–4.8)
ALBUMIN/GLOB SERPL: 0.8 RATIO (ref 1.1–2)
ALP SERPL-CCNC: 63 UNIT/L (ref 40–150)
ALT SERPL-CCNC: 21 UNIT/L (ref 0–55)
APPEARANCE UR: CLEAR
APTT PPP: 34.1 SECONDS (ref 23.4–33.9)
AST SERPL-CCNC: 21 UNIT/L (ref 5–34)
BACTERIA #/AREA URNS AUTO: NORMAL /HPF
BASOPHILS # BLD AUTO: 0.04 X10(3)/MCL
BASOPHILS NFR BLD AUTO: 0.4 %
BILIRUB UR QL STRIP.AUTO: NEGATIVE
BILIRUBIN DIRECT+TOT PNL SERPL-MCNC: 0.7 MG/DL
BNP BLD-MCNC: 298.6 PG/ML
BUN SERPL-MCNC: 12.1 MG/DL (ref 8.4–25.7)
CALCIUM SERPL-MCNC: 9.3 MG/DL (ref 8.8–10)
CHLORIDE SERPL-SCNC: 99 MMOL/L (ref 98–107)
CK SERPL-CCNC: 147 U/L (ref 30–200)
CO2 SERPL-SCNC: 28 MMOL/L (ref 23–31)
COLOR UR: ABNORMAL
CREAT SERPL-MCNC: 1.12 MG/DL (ref 0.73–1.18)
EOSINOPHIL # BLD AUTO: 0.04 X10(3)/MCL (ref 0–0.9)
EOSINOPHIL NFR BLD AUTO: 0.4 %
ERYTHROCYTE [DISTWIDTH] IN BLOOD BY AUTOMATED COUNT: 14.9 % (ref 11.5–17)
FLUAV AG UPPER RESP QL IA.RAPID: NOT DETECTED
FLUBV AG UPPER RESP QL IA.RAPID: NOT DETECTED
GFR SERPLBLD CREATININE-BSD FMLA CKD-EPI: >60 MLS/MIN/1.73/M2
GLOBULIN SER-MCNC: 4.1 GM/DL (ref 2.4–3.5)
GLUCOSE SERPL-MCNC: 190 MG/DL (ref 82–115)
GLUCOSE UR QL STRIP.AUTO: NEGATIVE
HCT VFR BLD AUTO: 39.1 % (ref 42–52)
HGB BLD-MCNC: 12.5 G/DL (ref 14–18)
IMM GRANULOCYTES # BLD AUTO: 0.02 X10(3)/MCL (ref 0–0.04)
IMM GRANULOCYTES NFR BLD AUTO: 0.2 %
INR PPP: 1.3 (ref 2–3)
KETONES UR QL STRIP.AUTO: NEGATIVE
LEUKOCYTE ESTERASE UR QL STRIP.AUTO: ABNORMAL
LYMPHOCYTES # BLD AUTO: 1.11 X10(3)/MCL (ref 0.6–4.6)
LYMPHOCYTES NFR BLD AUTO: 11.6 %
MCH RBC QN AUTO: 27.6 PG (ref 27–31)
MCHC RBC AUTO-ENTMCNC: 32 G/DL (ref 33–36)
MCV RBC AUTO: 86.3 FL (ref 80–94)
MONOCYTES # BLD AUTO: 0.7 X10(3)/MCL (ref 0.1–1.3)
MONOCYTES NFR BLD AUTO: 7.3 %
NEUTROPHILS # BLD AUTO: 7.64 X10(3)/MCL (ref 2.1–9.2)
NEUTROPHILS NFR BLD AUTO: 80.1 %
NITRITE UR QL STRIP.AUTO: NEGATIVE
NRBC BLD AUTO-RTO: 0 %
PH UR STRIP.AUTO: 6 [PH]
PLATELET # BLD AUTO: 416 X10(3)/MCL (ref 130–400)
PMV BLD AUTO: 9 FL (ref 7.4–10.4)
POTASSIUM SERPL-SCNC: 3.8 MMOL/L (ref 3.5–5.1)
PROT SERPL-MCNC: 7.4 GM/DL (ref 5.8–7.6)
PROT UR QL STRIP.AUTO: NEGATIVE
PROTHROMBIN TIME: 16.2 SECONDS (ref 11.7–14.5)
RBC # BLD AUTO: 4.53 X10(6)/MCL (ref 4.7–6.1)
RBC #/AREA URNS AUTO: NORMAL /HPF
RBC UR QL AUTO: NEGATIVE
RSV A 5' UTR RNA NPH QL NAA+PROBE: NOT DETECTED
SARS-COV-2 RNA RESP QL NAA+PROBE: NOT DETECTED
SODIUM SERPL-SCNC: 137 MMOL/L (ref 136–145)
SP GR UR STRIP.AUTO: 1.01
SQUAMOUS #/AREA URNS AUTO: NORMAL /HPF
TROPONIN I SERPL-MCNC: 0.02 NG/ML (ref 0–0.04)
TROPONIN I SERPL-MCNC: 0.02 NG/ML (ref 0–0.04)
UROBILINOGEN UR STRIP-ACNC: 0.2
WBC # SPEC AUTO: 9.55 X10(3)/MCL (ref 4.5–11.5)
WBC #/AREA URNS AUTO: NORMAL /HPF

## 2023-08-04 PROCEDURE — 25000242 PHARM REV CODE 250 ALT 637 W/ HCPCS: Performed by: INTERNAL MEDICINE

## 2023-08-04 PROCEDURE — 80053 COMPREHEN METABOLIC PANEL: CPT | Performed by: EMERGENCY MEDICINE

## 2023-08-04 PROCEDURE — 85025 COMPLETE CBC W/AUTO DIFF WBC: CPT | Performed by: EMERGENCY MEDICINE

## 2023-08-04 PROCEDURE — 84484 ASSAY OF TROPONIN QUANT: CPT | Performed by: NURSE PRACTITIONER

## 2023-08-04 PROCEDURE — 84484 ASSAY OF TROPONIN QUANT: CPT | Performed by: EMERGENCY MEDICINE

## 2023-08-04 PROCEDURE — 82550 ASSAY OF CK (CPK): CPT | Performed by: EMERGENCY MEDICINE

## 2023-08-04 PROCEDURE — 25000003 PHARM REV CODE 250: Performed by: NURSE PRACTITIONER

## 2023-08-04 PROCEDURE — 63600175 PHARM REV CODE 636 W HCPCS: Performed by: NURSE PRACTITIONER

## 2023-08-04 PROCEDURE — 21400001 HC TELEMETRY ROOM

## 2023-08-04 PROCEDURE — 63600175 PHARM REV CODE 636 W HCPCS: Performed by: EMERGENCY MEDICINE

## 2023-08-04 PROCEDURE — 93005 ELECTROCARDIOGRAM TRACING: CPT

## 2023-08-04 PROCEDURE — 99285 EMERGENCY DEPT VISIT HI MDM: CPT | Mod: 25

## 2023-08-04 PROCEDURE — 99900031 HC PATIENT EDUCATION (STAT)

## 2023-08-04 PROCEDURE — 83880 ASSAY OF NATRIURETIC PEPTIDE: CPT | Performed by: EMERGENCY MEDICINE

## 2023-08-04 PROCEDURE — 0241U COVID/RSV/FLU A&B PCR: CPT | Performed by: EMERGENCY MEDICINE

## 2023-08-04 PROCEDURE — 11000001 HC ACUTE MED/SURG PRIVATE ROOM

## 2023-08-04 PROCEDURE — 81001 URINALYSIS AUTO W/SCOPE: CPT | Performed by: EMERGENCY MEDICINE

## 2023-08-04 PROCEDURE — 94640 AIRWAY INHALATION TREATMENT: CPT

## 2023-08-04 PROCEDURE — 85610 PROTHROMBIN TIME: CPT | Performed by: EMERGENCY MEDICINE

## 2023-08-04 PROCEDURE — 63600175 PHARM REV CODE 636 W HCPCS: Performed by: INTERNAL MEDICINE

## 2023-08-04 PROCEDURE — 85730 THROMBOPLASTIN TIME PARTIAL: CPT | Performed by: EMERGENCY MEDICINE

## 2023-08-04 PROCEDURE — 25000242 PHARM REV CODE 250 ALT 637 W/ HCPCS: Performed by: EMERGENCY MEDICINE

## 2023-08-04 RX ORDER — SIMETHICONE 80 MG
1 TABLET,CHEWABLE ORAL 4 TIMES DAILY PRN
Status: DISCONTINUED | OUTPATIENT
Start: 2023-08-04 | End: 2023-08-06 | Stop reason: HOSPADM

## 2023-08-04 RX ORDER — METOPROLOL TARTRATE 1 MG/ML
5 INJECTION, SOLUTION INTRAVENOUS
Status: DISCONTINUED | OUTPATIENT
Start: 2023-08-04 | End: 2023-08-06 | Stop reason: HOSPADM

## 2023-08-04 RX ORDER — FUROSEMIDE 10 MG/ML
20 INJECTION INTRAMUSCULAR; INTRAVENOUS
Status: COMPLETED | OUTPATIENT
Start: 2023-08-04 | End: 2023-08-04

## 2023-08-04 RX ORDER — BENZONATATE 100 MG/1
100 CAPSULE ORAL 3 TIMES DAILY PRN
Status: DISCONTINUED | OUTPATIENT
Start: 2023-08-04 | End: 2023-08-06 | Stop reason: HOSPADM

## 2023-08-04 RX ORDER — SODIUM CHLORIDE 0.9 % (FLUSH) 0.9 %
10 SYRINGE (ML) INJECTION
Status: DISCONTINUED | OUTPATIENT
Start: 2023-08-04 | End: 2023-08-06 | Stop reason: HOSPADM

## 2023-08-04 RX ORDER — ENOXAPARIN SODIUM 100 MG/ML
40 INJECTION SUBCUTANEOUS EVERY 24 HOURS
Status: DISCONTINUED | OUTPATIENT
Start: 2023-08-04 | End: 2023-08-04

## 2023-08-04 RX ORDER — HYDRALAZINE HYDROCHLORIDE 20 MG/ML
10 INJECTION INTRAMUSCULAR; INTRAVENOUS
Status: DISCONTINUED | OUTPATIENT
Start: 2023-08-04 | End: 2023-08-04

## 2023-08-04 RX ORDER — NALOXONE HCL 0.4 MG/ML
0.02 VIAL (ML) INJECTION
Status: DISCONTINUED | OUTPATIENT
Start: 2023-08-04 | End: 2023-08-06 | Stop reason: HOSPADM

## 2023-08-04 RX ORDER — GUAIFENESIN/DEXTROMETHORPHAN 100-10MG/5
5 SYRUP ORAL EVERY 4 HOURS PRN
Status: DISCONTINUED | OUTPATIENT
Start: 2023-08-04 | End: 2023-08-06 | Stop reason: HOSPADM

## 2023-08-04 RX ORDER — PROCHLORPERAZINE EDISYLATE 5 MG/ML
5 INJECTION INTRAMUSCULAR; INTRAVENOUS EVERY 6 HOURS PRN
Status: DISCONTINUED | OUTPATIENT
Start: 2023-08-04 | End: 2023-08-06 | Stop reason: HOSPADM

## 2023-08-04 RX ORDER — FUROSEMIDE 10 MG/ML
40 INJECTION INTRAMUSCULAR; INTRAVENOUS
Status: DISCONTINUED | OUTPATIENT
Start: 2023-08-04 | End: 2023-08-06 | Stop reason: HOSPADM

## 2023-08-04 RX ORDER — ONDANSETRON 2 MG/ML
4 INJECTION INTRAMUSCULAR; INTRAVENOUS EVERY 4 HOURS PRN
Status: DISCONTINUED | OUTPATIENT
Start: 2023-08-04 | End: 2023-08-06 | Stop reason: HOSPADM

## 2023-08-04 RX ORDER — METOPROLOL TARTRATE 25 MG/1
25 TABLET, FILM COATED ORAL 2 TIMES DAILY
Status: DISCONTINUED | OUTPATIENT
Start: 2023-08-04 | End: 2023-08-06

## 2023-08-04 RX ORDER — IPRATROPIUM BROMIDE AND ALBUTEROL SULFATE 2.5; .5 MG/3ML; MG/3ML
3 SOLUTION RESPIRATORY (INHALATION)
Status: COMPLETED | OUTPATIENT
Start: 2023-08-04 | End: 2023-08-04

## 2023-08-04 RX ORDER — HYDRALAZINE HYDROCHLORIDE 20 MG/ML
20 INJECTION INTRAMUSCULAR; INTRAVENOUS EVERY 4 HOURS PRN
Status: DISCONTINUED | OUTPATIENT
Start: 2023-08-04 | End: 2023-08-06 | Stop reason: HOSPADM

## 2023-08-04 RX ORDER — MAG HYDROX/ALUMINUM HYD/SIMETH 200-200-20
30 SUSPENSION, ORAL (FINAL DOSE FORM) ORAL 4 TIMES DAILY PRN
Status: DISCONTINUED | OUTPATIENT
Start: 2023-08-04 | End: 2023-08-06 | Stop reason: HOSPADM

## 2023-08-04 RX ORDER — LEVALBUTEROL INHALATION SOLUTION 0.63 MG/3ML
0.63 SOLUTION RESPIRATORY (INHALATION) EVERY 8 HOURS
Status: DISCONTINUED | OUTPATIENT
Start: 2023-08-04 | End: 2023-08-06 | Stop reason: HOSPADM

## 2023-08-04 RX ORDER — ACETAMINOPHEN 325 MG/1
650 TABLET ORAL EVERY 4 HOURS PRN
Status: DISCONTINUED | OUTPATIENT
Start: 2023-08-04 | End: 2023-08-06 | Stop reason: HOSPADM

## 2023-08-04 RX ORDER — TALC
6 POWDER (GRAM) TOPICAL NIGHTLY PRN
Status: DISCONTINUED | OUTPATIENT
Start: 2023-08-04 | End: 2023-08-06 | Stop reason: HOSPADM

## 2023-08-04 RX ORDER — ACETAMINOPHEN 500 MG
1000 TABLET ORAL EVERY 6 HOURS PRN
Status: DISCONTINUED | OUTPATIENT
Start: 2023-08-04 | End: 2023-08-06 | Stop reason: HOSPADM

## 2023-08-04 RX ORDER — ENOXAPARIN SODIUM 100 MG/ML
1 INJECTION SUBCUTANEOUS EVERY 12 HOURS
Status: DISCONTINUED | OUTPATIENT
Start: 2023-08-04 | End: 2023-08-05

## 2023-08-04 RX ORDER — ENOXAPARIN SODIUM 100 MG/ML
1 INJECTION SUBCUTANEOUS
Status: COMPLETED | OUTPATIENT
Start: 2023-08-04 | End: 2023-08-04

## 2023-08-04 RX ADMIN — FUROSEMIDE 40 MG: 10 INJECTION, SOLUTION INTRAMUSCULAR; INTRAVENOUS at 09:08

## 2023-08-04 RX ADMIN — FUROSEMIDE 20 MG: 10 INJECTION, SOLUTION INTRAMUSCULAR; INTRAVENOUS at 10:08

## 2023-08-04 RX ADMIN — IPRATROPIUM BROMIDE AND ALBUTEROL SULFATE 3 ML: .5; 3 SOLUTION RESPIRATORY (INHALATION) at 10:08

## 2023-08-04 RX ADMIN — METOPROLOL TARTRATE 25 MG: 25 TABLET, FILM COATED ORAL at 08:08

## 2023-08-04 RX ADMIN — HYDRALAZINE HYDROCHLORIDE 10 MG: 20 INJECTION INTRAMUSCULAR; INTRAVENOUS at 05:08

## 2023-08-04 RX ADMIN — ENOXAPARIN SODIUM 80 MG: 80 INJECTION SUBCUTANEOUS at 10:08

## 2023-08-04 RX ADMIN — GUAIFENESIN AND DEXTROMETHORPHAN 5 ML: 100; 10 SYRUP ORAL at 05:08

## 2023-08-04 RX ADMIN — LEVALBUTEROL HYDROCHLORIDE 0.63 MG: 0.63 SOLUTION RESPIRATORY (INHALATION) at 08:08

## 2023-08-04 RX ADMIN — ENOXAPARIN SODIUM 80 MG: 80 INJECTION SUBCUTANEOUS at 08:08

## 2023-08-04 NOTE — PROGRESS NOTES
Seen as tele yesterday - left [facility]  Back today with CP   EKG no changes  Trop negative  Full dose lovenox if OK with primary team  PRN metoprolol

## 2023-08-04 NOTE — NURSING
Nurses Note -- 4 Eyes      8/4/2023   6:36 PM      Skin assessed during: Admit      [x] No Altered Skin Integrity Present    [x]Prevention Measures Documented      [] Yes- Altered Skin Integrity Present or Discovered   [] LDA Added if Not in Epic (Describe Wound)   [] New Altered Skin Integrity was Present on Admit and Documented in LDA   [] Wound Image Taken    Wound Care Consulted? Yes    Attending Nurse:  Avelina Wu RN/Staff Member:   Amalia Dutton

## 2023-08-04 NOTE — ED PROVIDER NOTES
Encounter Date: 8/4/2023       History     Chief Complaint   Patient presents with    Shortness of Breath     Pt reports ongoing SOB worsening x 1 month w/productive cough, denies fever. Also reports left chest wall pain. AMA yesterday at [facility].      Patient is an 89 yo M presneting with chest pain and sob. Currently chest pain is mild and on the left side. He's describes a decline in his overall health over the past year with increasing SOB and generalized weakness. No fevers. He has a chronic cough and feels like there is something rattling in his chest. No nausea, vomiting. No leg swelling.     He was seen yesterday at [facility] and left AMA. He presents today with is daughter.       Review of patient's allergies indicates:   Allergen Reactions    Colestipol     Meloxicam     Rosuvastatin     Simvastatin     Statins-hmg-coa reductase inhibitors     Tramadol      Past Medical History:   Diagnosis Date    Chronic pain     Hypercholesterolemia      Past Surgical History:   Procedure Laterality Date    FRACTURE SURGERY      TIBIA FRACTURE SURGERY Right      Family History   Problem Relation Age of Onset    Hypertension Mother     Heart disease Mother     Hyperlipidemia Mother     Cancer Mother     COPD Mother     Dementia Father      Social History     Tobacco Use    Smoking status: Never    Smokeless tobacco: Never   Substance Use Topics    Alcohol use: Yes     Comment: Has cut down from 6 to a couple of beer now    Drug use: Never     Review of Systems   Constitutional:  Positive for fatigue. Negative for fever.   HENT:  Negative for sore throat.    Respiratory:  Positive for shortness of breath.    Cardiovascular:  Positive for chest pain.   Gastrointestinal:  Negative for nausea.   Genitourinary:  Negative for dysuria.   Musculoskeletal:  Negative for back pain.   Skin:  Negative for rash.   Neurological:  Positive for weakness.   Hematological:  Does not bruise/bleed easily.        Physical Exam     Initial Vitals   BP Pulse Resp Temp SpO2   08/04/23 0856 08/04/23 0856 08/04/23 0856 08/04/23 0901 08/04/23 0856   (!) 154/99 95 20 98.4 °F (36.9 °C) (!) 93 %      MAP       --                Physical Exam    Nursing note and vitals reviewed.  Constitutional: He appears well-developed and well-nourished. He is not diaphoretic. No distress.   HENT:   Head: Normocephalic and atraumatic.   Eyes: Conjunctivae are normal.   Neck: Neck supple.   Cardiovascular:  Normal rate and normal heart sounds.           Irregular rhythm   Pulmonary/Chest: No respiratory distress. He has no wheezes. He has rhonchi. He has rales.   Abdominal: Abdomen is soft. Bowel sounds are normal. He exhibits no distension. There is no abdominal tenderness. There is no rebound and no guarding.   Musculoskeletal:         General: No edema. Normal range of motion.      Cervical back: Neck supple.     Neurological: He is alert and oriented to person, place, and time.   Skin: Skin is warm and dry.   Psychiatric: He has a normal mood and affect. Thought content normal.       ED Course   Procedures  Labs Reviewed   COMPREHENSIVE METABOLIC PANEL - Abnormal; Notable for the following components:       Result Value    Glucose Level 190 (*)     Albumin Level 3.3 (*)     Globulin 4.1 (*)     Albumin/Globulin Ratio 0.8 (*)     All other components within normal limits   B-TYPE NATRIURETIC PEPTIDE - Abnormal; Notable for the following components:    Natriuretic Peptide 298.6 (*)     All other components within normal limits   URINALYSIS, REFLEX TO URINE CULTURE - Abnormal; Notable for the following components:    Leukocyte Esterase, UA Trace (*)     All other components within normal limits   CBC WITH DIFFERENTIAL - Abnormal; Notable for the following components:    RBC 4.53 (*)     Hgb 12.5 (*)     Hct 39.1 (*)     MCHC 32.0 (*)     Platelet 416 (*)     All other components within normal limits   PROTIME-INR - Abnormal; Notable for the  following components:    PT 16.2 (*)     INR 1.3 (*)     All other components within normal limits   APTT - Abnormal; Notable for the following components:    PTT 34.1 (*)     All other components within normal limits   TROPONIN I - Normal   CK - Normal   COVID/RSV/FLU A&B PCR - Normal    Narrative:     The Xpert Xpress SARS-CoV-2/FLU/RSV plus is a rapid, multiplexed real-time PCR test intended for the simultaneous qualitative detection and differentiation of SARS-CoV-2, Influenza A, Influenza B, and respiratory syncytial virus (RSV) viral RNA in either nasopharyngeal swab or nasal swab specimens.         URINALYSIS, MICROSCOPIC - Normal   CBC W/ AUTO DIFFERENTIAL    Narrative:     The following orders were created for panel order CBC auto differential.  Procedure                               Abnormality         Status                     ---------                               -----------         ------                     CBC with Differential[[id number]]        Abnormal            Final result                 Please view results for these tests on the individual orders.     EKG Readings: (Independently Interpreted)   EKG Interpretation 9:00 AM    Rate: 102  Rhythm: atrial fibrillatoin  QRS: WNL  Qtc: 516ms  Overall similar morphology to yesterday though slightly more depressed from J point in lead V4     ECG Results              EKG 12-lead (Final result)  Result time 08/08/23 18:08:12      Final result by Interface, Lab In [facility] (08/08/23 18:08:12)                   Narrative:    Test Reason : R07.9,    Vent. Rate : 102 BPM     Atrial Rate : 115 BPM     P-R Int : 000 ms          QRS Dur : 152 ms      QT Int : 396 ms       P-R-T Axes : 000 -38 041 degrees     QTc Int : 516 ms    Atrial fibrillation with rapid ventricular response with premature  ventricular or aberrantly conducted complexes  Left axis deviation  Right bundle branch block  Abnormal ECG  When compared with ECG of 03-AUG-2023 11:56,  Criteria for  Anteroseptal infarct are no longer Present  Nonspecific T wave abnormality has replaced inverted T waves in Anterior  leads  QT has lengthened  Confirmed by Linwood Wilkins MD ([id number]) on 8/8/2023 6:08:05 PM    Referred By: [email or web address]   SELF           Confirmed By:Linwood Wilkins MD                                  Imaging Results              X-Ray Chest AP Portable (Final result)  Result time 08/04/23 09:46:33      Final result by Mundo Watts MD (08/04/23 09:46:33)                   Impression:      No significant change      Electronically signed by: Mundo Watts  Date:    08/04/2023  Time:    09:46               Narrative:    EXAMINATION:  XR CHEST AP PORTABLE    CLINICAL HISTORY:  Chest Pain;    TECHNIQUE:  Single frontal view of the chest was performed.    COMPARISON:  August 3, 2023    FINDINGS:  Examination reveals cardiomediastinal silhouette and pleuroparenchymal changes to be essentially unchanged as compared with the previous exam                                       Medications   furosemide injection 20 mg (20 mg Intravenous Given 8/4/23 1009)   albuterol-ipratropium 2.5 mg-0.5 mg/3 mL nebulizer solution 3 mL (3 mLs Nebulization Given 8/4/23 1014)   enoxaparin injection 80 mg (80 mg Subcutaneous Given 8/4/23 1009)   potassium chloride SA CR tablet 20 mEq (20 mEq Oral Given 8/5/23 1211)     Medical Decision Making:   Discussed findings with patient and family. Recommended admission for new onset atrial fibrillation, anticoagulation. Patient agreeable to this and blood thinner but declines antibiotics for findings on cxr. Understands risk of not treating pneumonia.            ED Course as of 08/10/23 0907   Fri Aug 04, 2023   0958 Patient declined antibiotics for finding on lung [GM]      ED Course User Index  [GM] Mirna Malone MD                 Clinical Impression:   Final diagnoses:  [R07.9] Chest pain  [I48.91] Atrial fibrillation        ED Disposition Condition    Admit                  Mirna Malone MD  08/10/23 0908

## 2023-08-04 NOTE — ED TRIAGE NOTES
Pt reports ongoing SOB worsening x 1 month w/productive cough, denies fever. Also reports left chest wall pain. AMA yesterday at [facility].

## 2023-08-05 LAB
ALBUMIN SERPL-MCNC: 2.9 G/DL (ref 3.4–4.8)
ALBUMIN/GLOB SERPL: 0.8 RATIO (ref 1.1–2)
ALP SERPL-CCNC: 55 UNIT/L (ref 40–150)
ALT SERPL-CCNC: 16 UNIT/L (ref 0–55)
AST SERPL-CCNC: 17 UNIT/L (ref 5–34)
AV INDEX (PROSTH): 0.73
AV MEAN GRADIENT: 7 MMHG
AV PEAK GRADIENT: 14 MMHG
AV VALVE AREA BY VELOCITY RATIO: 2.62 CM²
AV VALVE AREA: 2.78 CM²
AV VELOCITY RATIO: 0.69
BASOPHILS # BLD AUTO: 0.05 X10(3)/MCL
BASOPHILS NFR BLD AUTO: 0.6 %
BILIRUBIN DIRECT+TOT PNL SERPL-MCNC: 0.7 MG/DL
BSA FOR ECHO PROCEDURE: 1.98 M2
BUN SERPL-MCNC: 9.4 MG/DL (ref 8.4–25.7)
CALCIUM SERPL-MCNC: 8.7 MG/DL (ref 8.8–10)
CHLORIDE SERPL-SCNC: 97 MMOL/L (ref 98–107)
CO2 SERPL-SCNC: 28 MMOL/L (ref 23–31)
CREAT SERPL-MCNC: 0.89 MG/DL (ref 0.73–1.18)
CV ECHO LV RWT: 0.43 CM
DOP CALC AO PEAK VEL: 1.84 M/S
DOP CALC AO VTI: 26 CM
DOP CALC LVOT AREA: 3.8 CM2
DOP CALC LVOT DIAMETER: 2.2 CM
DOP CALC LVOT PEAK VEL: 1.27 M/S
DOP CALC LVOT STROKE VOLUME: 72.19 CM3
DOP CALC MV VTI: 7.4 CM
DOP CALCLVOT PEAK VEL VTI: 19 CM
ECHO LV POSTERIOR WALL: 1.11 CM (ref 0.6–1.1)
EOSINOPHIL # BLD AUTO: 0.06 X10(3)/MCL (ref 0–0.9)
EOSINOPHIL NFR BLD AUTO: 0.7 %
ERYTHROCYTE [DISTWIDTH] IN BLOOD BY AUTOMATED COUNT: 15 % (ref 11.5–17)
FRACTIONAL SHORTENING: 29 % (ref 28–44)
GFR SERPLBLD CREATININE-BSD FMLA CKD-EPI: >60 MLS/MIN/1.73/M2
GLOBULIN SER-MCNC: 3.8 GM/DL (ref 2.4–3.5)
GLUCOSE SERPL-MCNC: 120 MG/DL (ref 82–115)
HCT VFR BLD AUTO: 36.8 % (ref 42–52)
HGB BLD-MCNC: 11.9 G/DL (ref 14–18)
IMM GRANULOCYTES # BLD AUTO: 0.02 X10(3)/MCL (ref 0–0.04)
IMM GRANULOCYTES NFR BLD AUTO: 0.2 %
INTERVENTRICULAR SEPTUM: 1.29 CM (ref 0.6–1.1)
LEFT ATRIUM SIZE: 5 CM
LEFT ATRIUM VOLUME INDEX MOD: 61.4 ML/M2
LEFT ATRIUM VOLUME MOD: 121 CM3
LEFT INTERNAL DIMENSION IN SYSTOLE: 3.69 CM (ref 2.1–4)
LEFT VENTRICLE DIASTOLIC VOLUME INDEX: 64.97 ML/M2
LEFT VENTRICLE DIASTOLIC VOLUME: 128 ML
LEFT VENTRICLE MASS INDEX: 125 G/M2
LEFT VENTRICLE SYSTOLIC VOLUME INDEX: 29.3 ML/M2
LEFT VENTRICLE SYSTOLIC VOLUME: 57.8 ML
LEFT VENTRICULAR INTERNAL DIMENSION IN DIASTOLE: 5.17 CM (ref 3.5–6)
LEFT VENTRICULAR MASS: 246.55 G
LVOT MG: 3 MMHG
LVOT MV: 0.8 CM/S
LYMPHOCYTES # BLD AUTO: 1.57 X10(3)/MCL (ref 0.6–4.6)
LYMPHOCYTES NFR BLD AUTO: 18.3 %
MCH RBC QN AUTO: 27 PG (ref 27–31)
MCHC RBC AUTO-ENTMCNC: 32.3 G/DL (ref 33–36)
MCV RBC AUTO: 83.6 FL (ref 80–94)
MONOCYTES # BLD AUTO: 1.07 X10(3)/MCL (ref 0.1–1.3)
MONOCYTES NFR BLD AUTO: 12.5 %
MV MEAN GRADIENT: 1 MMHG
MV PEAK E VEL: 1.07 M/S
MV PEAK GRADIENT: 2 MMHG
MV VALVE AREA BY CONTINUITY EQUATION: 9.76 CM2
NEUTROPHILS # BLD AUTO: 5.81 X10(3)/MCL (ref 2.1–9.2)
NEUTROPHILS NFR BLD AUTO: 67.7 %
NRBC BLD AUTO-RTO: 0 %
OHS LV EJECTION FRACTION SIMPSONS BIPLANE MOD: 5 %
PISA TR MAX VEL: 2.71 M/S
PLATELET # BLD AUTO: 401 X10(3)/MCL (ref 130–400)
PMV BLD AUTO: 9.1 FL (ref 7.4–10.4)
POTASSIUM SERPL-SCNC: 3.3 MMOL/L (ref 3.5–5.1)
PROT SERPL-MCNC: 6.7 GM/DL (ref 5.8–7.6)
RA MAJOR: 6.76 CM
RA WIDTH: 4.86 CM
RBC # BLD AUTO: 4.4 X10(6)/MCL (ref 4.7–6.1)
RIGHT VENTRICULAR END-DIASTOLIC DIMENSION: 4.12 CM
SODIUM SERPL-SCNC: 135 MMOL/L (ref 136–145)
TR MAX PG: 29 MMHG
TRICUSPID ANNULAR PLANE SYSTOLIC EXCURSION: 1.88 CM
TROPONIN I SERPL-MCNC: 0.02 NG/ML (ref 0–0.04)
WBC # SPEC AUTO: 8.58 X10(3)/MCL (ref 4.5–11.5)
Z-SCORE OF LEFT VENTRICULAR DIMENSION IN END DIASTOLE: -0.91
Z-SCORE OF LEFT VENTRICULAR DIMENSION IN END SYSTOLE: 0.46

## 2023-08-05 PROCEDURE — 99900035 HC TECH TIME PER 15 MIN (STAT)

## 2023-08-05 PROCEDURE — 21400001 HC TELEMETRY ROOM

## 2023-08-05 PROCEDURE — 85025 COMPLETE CBC W/AUTO DIFF WBC: CPT | Performed by: NURSE PRACTITIONER

## 2023-08-05 PROCEDURE — 25000242 PHARM REV CODE 250 ALT 637 W/ HCPCS: Performed by: INTERNAL MEDICINE

## 2023-08-05 PROCEDURE — 94640 AIRWAY INHALATION TREATMENT: CPT

## 2023-08-05 PROCEDURE — 97161 PT EVAL LOW COMPLEX 20 MIN: CPT

## 2023-08-05 PROCEDURE — 63600175 PHARM REV CODE 636 W HCPCS: Performed by: INTERNAL MEDICINE

## 2023-08-05 PROCEDURE — 97165 OT EVAL LOW COMPLEX 30 MIN: CPT

## 2023-08-05 PROCEDURE — 80053 COMPREHEN METABOLIC PANEL: CPT | Performed by: NURSE PRACTITIONER

## 2023-08-05 PROCEDURE — 25000003 PHARM REV CODE 250: Performed by: NURSE PRACTITIONER

## 2023-08-05 PROCEDURE — 63600175 PHARM REV CODE 636 W HCPCS: Performed by: NURSE PRACTITIONER

## 2023-08-05 RX ORDER — POTASSIUM CHLORIDE 20 MEQ/1
20 TABLET, EXTENDED RELEASE ORAL ONCE
Status: COMPLETED | OUTPATIENT
Start: 2023-08-05 | End: 2023-08-05

## 2023-08-05 RX ADMIN — METOPROLOL TARTRATE 25 MG: 25 TABLET, FILM COATED ORAL at 09:08

## 2023-08-05 RX ADMIN — FUROSEMIDE 40 MG: 10 INJECTION, SOLUTION INTRAMUSCULAR; INTRAVENOUS at 08:08

## 2023-08-05 RX ADMIN — LEVALBUTEROL HYDROCHLORIDE 0.63 MG: 0.63 SOLUTION RESPIRATORY (INHALATION) at 12:08

## 2023-08-05 RX ADMIN — METOPROLOL TARTRATE 25 MG: 25 TABLET, FILM COATED ORAL at 08:08

## 2023-08-05 RX ADMIN — ENOXAPARIN SODIUM 80 MG: 80 INJECTION SUBCUTANEOUS at 09:08

## 2023-08-05 RX ADMIN — LEVALBUTEROL HYDROCHLORIDE 0.63 MG: 0.63 SOLUTION RESPIRATORY (INHALATION) at 04:08

## 2023-08-05 RX ADMIN — POTASSIUM CHLORIDE 20 MEQ: 1500 TABLET, EXTENDED RELEASE ORAL at 12:08

## 2023-08-05 RX ADMIN — RIVAROXABAN 20 MG: 10 TABLET, FILM COATED ORAL at 04:08

## 2023-08-05 RX ADMIN — FUROSEMIDE 40 MG: 10 INJECTION, SOLUTION INTRAMUSCULAR; INTRAVENOUS at 09:08

## 2023-08-05 NOTE — PT/OT/SLP EVAL
Physical Therapy Evaluation and Discharge Note    Patient Name:  Lizandro Martinez   MRN:  [id number]    Recommendations:     Discharge Recommendations: home  Discharge Equipment Recommendations: none   Barriers to discharge:  acuity of medical condition    Assessment:     Lizandro Martinez is a 88 y.o. male admitted with a medical diagnosis of Atrial fibrillation. .  At this time, patient is functioning at their prior level of function and does not require further acute PT services.     Recent Surgery: * No surgery found *      Plan:     During this hospitalization, patient does not require further acute PT services.  Please re-consult if situation changes.      Subjective     Chief Complaint: I feel better now  Patient/Family Comments/goals: be able to go home  Pain/Comfort:  Pain Rating 1: 0/10    Patients cultural, spiritual, Alevism conflicts given the current situation: no    Living Environment:  Lives at home with daughter, in a SSH with 2-3 steps and also ramp to get in  Prior to admission, patients level of function was modified independent in ADL and no AD for mobility.  Equipment used at home: none.  Upon discharge, patient will have assistance from family.    Objective:     Communicated with nursing prior to session.  Patient found HOB elevated with peripheral IV, telemetry upon PT entry to room.    General Precautions: Standard,      /94, HR 80, O2sat 94%    Respiratory Status: Room air    Exams:  RLE ROM: WNL  RLE Strength: WNL  LLE ROM: WNL  LLE Strength: WNL    Functional Mobility:  Bed Mobility:     Supine to Sit: modified independence  Sit to Supine: modified independence  Transfers:     Sit to Stand:  modified independence with no AD  Bed to Chair: modified independence with  no AD  using  Step Transfer  Gait: ambulates without using AD in MI x150ft  Balance: standing balance Good    AM-PAC 6 CLICK MOBILITY  Total Score:21       AM-PAC 6 CLICK MOBILITY  Total Score:21     Patient left HOB  elevated with call button in reach.    GOALS:   Multidisciplinary Problems       Physical Therapy Goals       Not on file                    History:     Past Medical History:   Diagnosis Date    Chronic pain     Hypercholesterolemia        Past Surgical History:   Procedure Laterality Date    FRACTURE SURGERY      TIBIA FRACTURE SURGERY Right        Time Tracking:     PT Received On:    PT Start Time: 0926     PT Stop Time: 0942  PT Total Time (min): 16 min     Billable Minutes: Evaluation low complexity      08/05/2023

## 2023-08-05 NOTE — PROGRESS NOTES
[facility] Hospital Medicine Progress Note        Chief Complaint: Inpatient Follow-up     HPI:   88-year-old male who has a documented prior history of hypertension, hyperlipidemia and chronic pain but was on no prescribed medications until yesterday when he presented to the ER complaining of shortness of breath.  He reported his symptoms had worsened over many months but has been present for over a year.  He was a difficult historian and very adamant about not being on many medications.  He was seen by tele Cardiology and was found to have new onset AFib with RVR.  He was given a dose of IV Lasix and metoprolol but ultimately left that facility AMA but he was given prescriptions for both these medications.  He presented back to the ER today for worsening symptoms.  He also complained of chest pain today.     On arrival to the ER patient was afebrile and hemodynamically stable satting in the low 90s on room air.  Laboratory work was only significant for elevated BNP.  Chest x-ray again noted cardiomegaly and hazy interstitial opacities in both lungs concerning for pulmonary edema.  EKG showed AFib with RVR and a right bundle-branch block.  He was given full-dose Lovenox, IV Lasix and admitted to the hospitalist service.    Interval Hx:  Patient doing okay this morning.  He is on room air and denies any chest pain.  Patient's son's girlfriend is at the bedside.  She seems to be his primary caretaker.  Son lives in [place] but is on his way to the hospital.  I discussed their that we will get an echocardiogram and follow up on Cardiology recommendations this morning.    Objective/physical exam:  General: In no acute distress, afebrile  Chest: Clear to auscultation bilaterally  Heart: RRR, +S1, S2, no appreciable murmur  Abdomen: Soft, nontender, BS +  MSK: Warm, no lower extremity edema, no clubbing or cyanosis  Neurologic: Alert and oriented x4, Cranial nerve II-XII intact, Strength  5/5 in all 4 extremities    VITAL SIGNS: 24 HRS MIN & MAX LAST   Temp  Min: 98 °F (36.7 °C)  Max: 98.6 °F (37 °C) 98 °F (36.7 °C)   BP  Min: 128/74  Max: 179/110 137/69   Pulse  Min: 65  Max: 108  80   Resp  Min: 16  Max: 20 20   SpO2  Min: 92 %  Max: 98 % (!) 92 %       Recent Labs   Lab 08/03/23  1216 08/04/23  0905   WBC 8.58 9.55   RBC 4.51* 4.53*   HGB 12.2* 12.5*   HCT 39.1* 39.1*   MCV 86.7 86.3   MCH 27.1 27.6   MCHC 31.2* 32.0*   RDW 15.1 14.9   * 416*   MPV 9.0 9.0       Recent Labs   Lab 08/03/23  1216 08/04/23  0905    137   K 4.6 3.8   CO2 28 28   BUN 10.0 12.1   CREATININE 1.10 1.12   CALCIUM 9.1 9.3   ALBUMIN 3.1* 3.3*   ALKPHOS 58 63   ALT 16 21   AST 19 21   BILITOT 0.9 0.7          Microbiology Results (last 7 days)       ** No results found for the last 168 hours. **             See below for Radiology    Scheduled Med:   enoxparin  1 mg/kg Subcutaneous Q12H (prophylaxis, 0900/2100)    furosemide (LASIX) injection  40 mg Intravenous Q12H    levalbuterol  0.63 mg Nebulization Q8H    metoprolol tartrate  25 mg Oral BID        Continuous Infusions:       PRN Meds:  acetaminophen, acetaminophen, aluminum-magnesium hydroxide-simethicone, benzonatate, dextromethorphan-guaiFENesin  mg/5 ml, hydrALAZINE, melatonin, metoprolol, naloxone, ondansetron, prochlorperazine, simethicone, sodium chloride 0.9%       Assessment/Plan:   Newly diagnosed decompensated congestive heart failure   Atrial fibrillation with RVR      HX: HTN, HLD, chronic pain      Echocardiogram ordered and pending.    Heart rate is currently controlled.  Appears to be sinus on tele.  He was started on treatment dose Lovenox.  Continue beta-blocker for now.    Also on IV Lasix for new diagnosis of heart failure.  Symptomatically is greatly improved.  Could potentially switch over to oral today.    Will follow-up further cardiology recommendations.    Critical care diagnosis: acute systolic heart failure requiring IV  diuretics  Critical care interventions: hands on evaluation, review of labs/radiographs/records and discussions with family  Critical care time spent: >32 minutes    All diagnosis and differential diagnosis have been reviewed; assessment and plan has been documented; I have personally reviewed the labs and test results that are presently available; I have reviewed the patients medication list; I have reviewed the consulting providers response and recommendations. I have reviewed or attempted to review medical records based upon their availability    All of the patient's questions have been  addressed and answered. Patient's is agreeable to the above stated plan. I will continue to monitor closely and make adjustments to medical management as needed.  _____________________________________________________________________      Radiology:  X-Ray Chest AP Portable  Narrative: EXAMINATION:  XR CHEST AP PORTABLE    CLINICAL HISTORY:  Chest Pain;    TECHNIQUE:  Single frontal view of the chest was performed.    COMPARISON:  August 3, 2023    FINDINGS:  Examination reveals cardiomediastinal silhouette and pleuroparenchymal changes to be essentially unchanged as compared with the previous exam  Impression: No significant change    Electronically signed by: Mundo Watts  Date:    08/04/2023  Time:    09:46      Giovanny Salgado MD   08/05/2023

## 2023-08-05 NOTE — PT/OT/SLP EVAL
Occupational Therapy   Evaluation and Discharge Note    Name: Lizandro Martinez  MRN: [id number]  Admitting Diagnosis: Atrial fibrillation  Recent Surgery: * No surgery found *      Recommendations:     Discharge Recommendations: home  Discharge Equipment Recommendations: none  Barriers to discharge:  None    Assessment:     Lizandro Martinez is a 88 y.o. male with a medical diagnosis of Atrial fibrillation. At this time, patient is functioning at their prior level of function and does not require further acute OT services.     Plan:     During this hospitalization, patient does not require further acute OT services.  Please re-consult if situation changes.    Plan of Care Reviewed with: patient    Subjective     Chief Complaint: none  Patient/Family Comments/goals: to go shower    Occupational Profile:  Living Environment: lives alone in [facility] with ramp to enter; walk in shower  Previous level of function: IND in all ADL/IADLs  Roles and Routines: father, father in law  Equipment Used at home: none; has shower chair  Assistance upon Discharge: family    Pain/Comfort:  Pain Rating 1: 0/10    Patients cultural, spiritual, Restorationist conflicts given the current situation: no    Objective:     Communicated with: NSJOCELYN prior to session.  Patient found sitting edge of bed upon OT entry to room.    General Precautions: Standard,    Orthopedic Precautions:    Braces:    Respiratory Status: Room air   Vital Signs: 138/92 97 bpm     Occupational Performance:    Functional Mobility/Transfers:  Patient completed Sit <> Stand Transfer with independence  with  no assistive device   Shower transfer with modified independence with grab bar  Functional Mobility: ambulating throughout room IND with no AD; no LOB or unsteadiness noted    Activities of Daily Living:  Grooming: independence standing at sink  Upper Body Dressing: independence doffed gown  Lower Body Dressing: independence doffed/donned socks  Toileting: independence standing at  toilet    Cognitive/Visual Perceptual:  Cognitive/Psychosocial Skills:     -       Oriented to: Person, Place, Time, and Situation   -       Follows Commands/attention:Follows two-step commands  Visual/Perceptual:      -Intact tracking in all visual fields    Physical Exam:  Sensation:    -       Intact  Upper Extremity Range of Motion:     -       Right Upper Extremity: WFL  -       Left Upper Extremity: WFL  Upper Extremity Strength:    -       Right Upper Extremity: WFL  -       Left Upper Extremity: WFL   Strength:    -       Right Upper Extremity: WFL  -       Left Upper Extremity: WFL  Fine Motor Coordination:    -       Intact  Left hand, finger to nose, Right hand, finger to nose, Left hand, manipulation of objects, and Right hand, manipulation of objects      Therapeutic Positioning  Risk for acquired pressure injuries is decreased due to intact sensation, continence, and ability to mobilize independently .    OT interventions performed during the course of today's session in an effort to prevent and/or reduce acquired pressure injuries:   Education on Pressure Ulcer Prevention provided    Skin assessment: full body skin assessment was performed    Findings: no redness or breakdown noted    OT recommendations for therapeutic positioning throughout hospitalization:   Follow [facility] Pressure Injury Prevention Protocol      Patient Education:  Patient and family provided with verbal education regarding OT role/goals/POC, fall prevention, and Discharge/DME recommendations.  Understanding was verbalized.     Patient left ambulatory in room/nicole with all lines intact, call button in reach, NSG notified, and daughter in law present    GOALS:   Multidisciplinary Problems       Occupational Therapy Goals       Not on file                    History:     Past Medical History:   Diagnosis Date    Chronic pain     Hypercholesterolemia          Past Surgical History:   Procedure Laterality Date    FRACTURE SURGERY       TIBIA FRACTURE SURGERY Right        Time Tracking:     OT Date of Treatment:    OT Start Time: 1425  OT Stop Time: 1440  OT Total Time (min): 15 min    Billable Minutes:Evaluation low    8/5/2023

## 2023-08-05 NOTE — CONSULTS
Consult to Cardiology   Preformed by: Farideh Carbone [facility]-  Consult ordered by: Dr. Salgado  Reason for Consult: new onset afib & CHF?    [facility] - Emergency Dept  Telecardiology  Consult Note    Patient Name: Lizandro Martinez  MRN: [id number]  Admission Date: 8/4/2023  Hospital Length of Stay: 1 days  Code Status: Full Code   Attending Provider: Giovanny Salgado MD   Consulting Provider: Farideh Carbone NP  Primary Care Physician: [facility]  Principal Problem:Atrial fibrillation    Patient information was obtained from patient, past medical records, and ER records.     Subjective:     Chief Complaint:  CP & SOB    HPI:   This is an 88 year old male, who is unknown to [facility] except though previosu tele cardiology visit on 8.3.23, with a history of HTN, HLD, chronic pain. He presented to the ER on 8.5.23 with complaints of SOB with productive cough that has been ongoing for 1 month and L chest wall pain. Patient presented to [facility] on 8.3.23 with the same complaints. On this hospital stay he was found to have a new onset of Afib RVR however he denied wanting to start OAC when CVA risk reduction was discussed. ED course: WBC 9.55, H/H 12.5/39.1, plt 416, INR 1.3, K 3.8, .2, Trop 0.026 --> 0.021. EKG reveals Afib CVR. [facility] consulted for new onset afib & CHF      PMH: HTN, HLD, chronic pain  PSH: Repair of tibia fracture  Social History: Current EtOH use (approximately 6 pack of beer per day), denies tobacco and illicit drug use  Family History:  Mother-HTN, heart disease, HLD, Cancer, COPD; Father-dementia    Previous Cardiac Diagnostics:   No previous       Review of patient's allergies indicates:   Allergen Reactions    Colestipol     Meloxicam     Rosuvastatin     Simvastatin     Statins-hmg-coa reductase inhibitors     Tramadol        No current facility-administered medications on file prior to encounter.     Current Outpatient Medications on File Prior to Encounter   Medication Sig     furosemide (LASIX) 20 MG tablet Take 1 tablet (20 mg total) by mouth 2 (two) times daily.    hawthorn 500 mg Cap Take 1 capsule by mouth.    metoprolol succinate (TOPROL-XL) 25 MG 24 hr tablet Take 1 tablet (25 mg total) by mouth once daily.       Review of Systems:  Review of Systems   Constitutional: Negative.    HENT: Negative.     Eyes: Negative.    Respiratory:  Positive for cough and shortness of breath.    Cardiovascular: Negative.    Gastrointestinal: Negative.    Endocrine: Negative.    Genitourinary: Negative.    Musculoskeletal: Negative.    Skin: Negative.    Allergic/Immunologic: Negative.    Neurological: Negative.    Hematological: Negative.    Psychiatric/Behavioral: Negative.         Objective:     Vital Signs (Most Recent):  Temp: 98 °F (36.7 °C) (08/05/23 0459)  Pulse: 80 (08/05/23 0459)  Resp: 20 (08/05/23 0459)  BP: 137/69 (08/05/23 0459)  SpO2: (!) 92 % (08/05/23 0459) Vital Signs (24h Range):  Temp:  [98 °F (36.7 °C)-98.6 °F (37 °C)] 98 °F (36.7 °C)  Pulse:  [] 80  Resp:  [16-20] 20  SpO2:  [92 %-98 %] 92 %  BP: (128-179)/() 137/69     Weight: 79.5 kg (175 lb 4.3 oz)  Body mass index is 25.15 kg/m².    SpO2: (!) 92 %         Intake/Output Summary (Last 24 hours) at 8/5/2023 0715  Last data filed at 8/4/2023 2251  Gross per 24 hour   Intake --   Output 450 ml   Net -450 ml       Lines/Drains/Airways       Peripheral Intravenous Line  Duration                  Peripheral IV - Single Lumen 08/04/23 20 G Anterior;Distal;Right Upper Arm 1 day                      Significant Labs:   Recent Lab Results         08/04/23  2331   08/04/23  1729   08/04/23  1254   08/04/23  0905        RSV Ag by Molecular Method       Not Detected       Influenza A, Molecular       Not Detected       Influenza B, Molecular       Not Detected       Albumin/Globulin Ratio       0.8       Albumin       3.3       Alkaline Phosphatase       63       ALT       21       Appearance, UA     Clear         aPTT        34.1  Comment: For Minimal Heparin Infusion, the goal aPTT 64-85 seconds corresponds to an anti-Xa of 0.3-0.5.    For Low Intensity and High Intensity Heparin, the goal aPTT  seconds corresponds to an anti-Xa of 0.3-0.7       AST       21       Bacteria, UA     None Seen         Baso #       0.04       Basophil %       0.4       BILIRUBIN TOTAL       0.7       Bilirubin, UA     Negative         BNP       298.6       BUN       12.1       Calcium       9.3       Chloride       99       CO2       28       Color, UA     Straw         CPK       147       Creatinine       1.12       eGFR       >60       Eos #       0.04       Eosinophil %       0.4       Globulin, Total       4.1       Glucose       190       Glucose, UA     Negative         Hematocrit       39.1       Hemoglobin       12.5       Immature Grans (Abs)       0.02       Immature Granulocytes       0.2       INR       1.3       Ketones, UA     Negative         Leukocytes, UA     Trace         Lymph #       1.11       LYMPH %       11.6       MCH       27.6       MCHC       32.0       MCV       86.3       Mono #       0.70       Mono %       7.3       MPV       9.0       Neut #       7.64       Neut %       80.1       NITRITE UA     Negative         nRBC       0.0       Occult Blood UA     Negative         pH, UA     6.0         Platelets       416       Potassium       3.8       PROTEIN TOTAL       7.4       Protein, UA     Negative         Protime       16.2       RBC       4.53       RBC, UA     None Seen         RDW       14.9       SARS-CoV2 (COVID-19) Qualitative PCR       Not Detected       Sodium       137       Specific Gravity,UA     1.010         Squam Epithel, UA     None Seen         Troponin I 0.021   0.020     0.022       Urobilinogen, UA     0.2         WBC, UA     None Seen         WBC       9.55                 Significant Imaging:   Imaging Results              X-Ray Chest AP Portable (Final result)  Result time 08/04/23 09:46:33       Final result by Mundo Watts MD (08/04/23 09:46:33)                   Impression:      No significant change      Electronically signed by: Mundo Watts  Date:    08/04/2023  Time:    09:46               Narrative:    EXAMINATION:  XR CHEST AP PORTABLE    CLINICAL HISTORY:  Chest Pain;    TECHNIQUE:  Single frontal view of the chest was performed.    COMPARISON:  August 3, 2023    FINDINGS:  Examination reveals cardiomediastinal silhouette and pleuroparenchymal changes to be essentially unchanged as compared with the previous exam                                        EKG:        Telemetry:  Afib with CVR    Physical Exam:  Physical Exam  HENT:      Head: Atraumatic.   Eyes:      Extraocular Movements: Extraocular movements intact.   Cardiovascular:      Rate and Rhythm: Normal rate. Rhythm irregular.      Pulses: Normal pulses.   Pulmonary:      Effort: Pulmonary effort is normal.   Neurological:      General: No focal deficit present.      Mental Status: He is alert. Mental status is at baseline.   Psychiatric:         Mood and Affect: Affect is angry.         Behavior: Behavior is uncooperative and agitated.         Home Medications:   No current facility-administered medications on file prior to encounter.     Current Outpatient Medications on File Prior to Encounter   Medication Sig Dispense Refill    furosemide (LASIX) 20 MG tablet Take 1 tablet (20 mg total) by mouth 2 (two) times daily. 60 tablet 11    hawthorn 500 mg Cap Take 1 capsule by mouth.      metoprolol succinate (TOPROL-XL) 25 MG 24 hr tablet Take 1 tablet (25 mg total) by mouth once daily. 30 tablet 11       Current Inpatient Medications:    Current Facility-Administered Medications:     acetaminophen tablet 1,000 mg, 1,000 mg, Oral, Q6H PRN, Debra Gutierrez FNP    acetaminophen tablet 650 mg, 650 mg, Oral, Q4H PRN, Debra Gutierrez FNP    aluminum-magnesium hydroxide-simethicone 200-200-20 mg/5 mL suspension 30 mL, 30  mL, Oral, QID PRN, Debra Gutierrez FNP    benzonatate capsule 100 mg, 100 mg, Oral, TID PRN, Debra Gutierrez FNP    dextromethorphan-guaiFENesin  mg/5 ml liquid 5 mL, 5 mL, Oral, Q4H PRN, Debra Gutierrez FNP, 5 mL at 08/04/23 1722    enoxaparin injection 80 mg, 1 mg/kg, Subcutaneous, Q12H (prophylaxis, 0900/2100), Adolfo Lynn., NP, 80 mg at 08/04/23 2017    furosemide injection 40 mg, 40 mg, Intravenous, Q12H, Luc Kwon MD, 40 mg at 08/04/23 2118    hydrALAZINE injection 20 mg, 20 mg, Intravenous, Q4H PRN, Adolfo Lynn Q., NP    levalbuterol nebulizer solution 0.63 mg, 0.63 mg, Nebulization, Q8H, Kurtis Moon MD, 0.63 mg at 08/05/23 0049    melatonin tablet 6 mg, 6 mg, Oral, Nightly PRN, Debra Gutierrez FNP    metoprolol injection 5 mg, 5 mg, Intravenous, Q15 Min PRN, Adolfo Lynn., NP    metoprolol tartrate (LOPRESSOR) tablet 25 mg, 25 mg, Oral, BID, Adolfo Lynn Q., NP, 25 mg at 08/04/23 2017    naloxone 0.4 mg/mL injection 0.02 mg, 0.02 mg, Intravenous, PRN, Debra Gutierrez FNP    ondansetron injection 4 mg, 4 mg, Intravenous, Q4H PRN, Debra Gutierrez FNP    prochlorperazine injection Soln 5 mg, 5 mg, Intravenous, Q6H PRN, Debra Gutierrez FNP    simethicone chewable tablet 80 mg, 1 tablet, Oral, QID PRN, Debra Gutierrez, CHARLIE    sodium chloride 0.9% flush 10 mL, 10 mL, Intravenous, PRN, Debra Gutierrez FNP           Assessment/Plan:   IKaleb MD,performed the substantive portion of this visit. I had a face-to-face time with the patient on 8/5/23. I reviewed and agree with the nurse practitioner's history, physical exam.     Medical decision making:     Newly diagnosed Afib with RVR, now with CVR  -Did not feel palpitations  -[id number] 3 (age, HTN)  Shortness of breath  -worsened over the past few months  HTN   - uncontrolled  HLD  -Statin allergy  Daily EtOH use  Chronic pain    Plan:  Cont  Toprol XL 25mg  daily  Cont Lasix 40mg daily  Recommend Xarelto 20mg nightly for CVA prophylaxis   Cont to monitor tele   Thank you for your consult.     Farideh Carbone NP  Cardiology  [facility] - Emergency Dept  08/05/2023 2:59 PM

## 2023-08-05 NOTE — H&P
[facility]  Hospital Medicine History & Physical Examination       Patient Name: Lizandro Martinez  MRN: [id number]  Patient Class: IP- Inpatient   Admission Date: 8/4/2023  8:50 AM  Length of Stay: 0  Admitting Service: Hospital Medicine   Attending Physician: Luc Kwon MD   Primary Care Provider: [facility]  History source: EMR, patient and/or patient's family    CHIEF COMPLAINT   Shortness of Breath (Pt reports ongoing SOB worsening x 1 month w/productive cough, denies fever. Also reports left chest wall pain. AMA yesterday at [facility]. )    HISTORY OF PRESENT ILLNESS:   Patient is an 88-year-old male who has a documented prior history of hypertension, hyperlipidemia and chronic pain but was on no prescribed medications until yesterday when he presented to the ER complaining of shortness of breath.  He reported his symptoms had worsened over many months but has been present for over a year.  He was a difficult historian and very adamant about not being on many medications.  He was seen by tele Cardiology and was found to have new onset AFib with RVR.  He was given a dose of IV Lasix and metoprolol but ultimately left that facility AMA but he was given prescriptions for both these medications.  He presented back to the ER today for worsening symptoms.  He also complained of chest pain today.    On arrival to the ER patient was afebrile and hemodynamically stable satting in the low 90s on room air.  Laboratory work was only significant for elevated BNP.  Chest x-ray again noted cardiomegaly and hazy interstitial opacities in both lungs concerning for pulmonary edema.  EKG showed AFib with RVR and a right bundle-branch block.  He was given full-dose Lovenox, IV Lasix and admitted to the hospitalist service.    PAST MEDICAL HISTORY:   HTN  HLD  Chronic pain    PAST SURGICAL HISTORY:     Past Surgical History:   Procedure Laterality Date    FRACTURE SURGERY       "TIBIA FRACTURE SURGERY Right        ALLERGIES:   Colestipol, Meloxicam, Rosuvastatin, Simvastatin, Statins-hmg-coa reductase inhibitors, and Tramadol    FAMILY HISTORY:   Reviewed and non-contributory     SOCIAL HISTORY:     Social History     Tobacco Use    Smoking status: Never    Smokeless tobacco: Never   Substance Use Topics    Alcohol use: Yes     Comment: Has cut down from 6 to a couple of beer now        HOME MEDICATIONS:     Prior to Admission medications    Medication Sig Start Date End Date Taking? Authorizing Provider   furosemide (LASIX) 20 MG tablet Take 1 tablet (20 mg total) by mouth 2 (two) times daily. 8/3/23 8/2/24  Froylan Schwartz MD   hawthorn 500 mg Cap Take 1 capsule by mouth.    Provider, Historical   metoprolol succinate (TOPROL-XL) 25 MG 24 hr tablet Take 1 tablet (25 mg total) by mouth once daily. 8/3/23 8/2/24  Froylan Schwartz MD       REVIEW OF SYSTEMS:   Except as documented, all other systems reviewed and negative     PHYSICAL EXAM:   T 98.4 °F (36.9 °C)   BP (!) 155/81   P 65   RR 18   O2 (!) 92 %  GENERAL: awake, alert, oriented and in no acute distress, non-toxic appearing   HEENT: normocephalic atraumatic   NECK: supple   LUNGS: faint basilar crackles, no wheezing or rales, no accessory muscle use   CVS: Regular rate and rhythm, normal peripheral perfusion  ABD: Soft, non-tender, non-distended, bowel sounds present  EXTREMITIES: no clubbing or cyanosis  SKIN: Warm, dry.   NEURO: alert and oriented, grossly without focal deficits   PSYCHIATRIC: Cooperative    LABS AND IMAGING:     Recent Labs     08/03/23  1216 08/04/23  0905   WBC 8.58 9.55   RBC 4.51* 4.53*   HGB 12.2* 12.5*   HCT 39.1* 39.1*   MCV 86.7 86.3   MCH 27.1 27.6   MCHC 31.2* 32.0*   RDW 15.1 14.9   * 416*     No results for input(s): "LACTIC" in the last 72 hours.  Recent Labs     08/04/23  0905   INR 1.3*     No results for input(s): "HGBA1C", "CHOL", "TRIG", "LDL", "VLDL", "HDL" in the last 72 hours.   " Recent Labs     08/03/23  1216 08/04/23  0905    137   K 4.6 3.8   CHLORIDE 100 99   CO2 28 28   BUN 10.0 12.1   CREATININE 1.10 1.12   GLUCOSE 126* 190*   CALCIUM 9.1 9.3   ALBUMIN 3.1* 3.3*   GLOBULIN 4.0* 4.1*   ALKPHOS 58 63   ALT 16 21   AST 19 21   BILITOT 0.9 0.7     Recent Labs     08/03/23  1216 08/04/23  0905 08/04/23  1729   .2* 298.6*  --    CPK  --  147  --    TROPONINI 0.026 0.022 0.020          X-Ray Chest AP Portable  Narrative: EXAMINATION:  XR CHEST AP PORTABLE    CLINICAL HISTORY:  Chest Pain;    TECHNIQUE:  Single frontal view of the chest was performed.    COMPARISON:  August 3, 2023    FINDINGS:  Examination reveals cardiomediastinal silhouette and pleuroparenchymal changes to be essentially unchanged as compared with the previous exam  Impression: No significant change    Electronically signed by: Mundo Watts  Date:    08/04/2023  Time:    09:46      ASSESSMENT & PLAN:   Newly diagnosed decompensated congestive heart failure   Atrial fibrillation with RVR     HX: HTN, HLD, chronic pain     -obtain echocardiogram, telemetry monitoring and trend cardiac enzymes  -IV Lasix, strict I's and O's  -full-dose Lovenox patient may be reluctant to be on oral anticoagulation at DC   -as needed IV antihypertensives, continue oral beta-blocker for rate control  -cardiology following     DVT prophylaxis: lovenox  Code status: full     If patient was admitted under observational status it is with my approval/permission.     At least 55 min was spent on this history and physical.  Time seen: 11PM 8/4/23  Luc Kwon MD

## 2023-08-06 VITALS
DIASTOLIC BLOOD PRESSURE: 88 MMHG | HEIGHT: 70 IN | SYSTOLIC BLOOD PRESSURE: 152 MMHG | BODY MASS INDEX: 25.06 KG/M2 | TEMPERATURE: 98 F | OXYGEN SATURATION: 95 % | WEIGHT: 175.06 LBS | HEART RATE: 90 BPM | RESPIRATION RATE: 22 BRPM

## 2023-08-06 LAB
ANION GAP SERPL CALC-SCNC: 8 MEQ/L
BASOPHILS # BLD AUTO: 0.03 X10(3)/MCL
BASOPHILS NFR BLD AUTO: 0.4 %
BUN SERPL-MCNC: 9.9 MG/DL (ref 8.4–25.7)
CALCIUM SERPL-MCNC: 8.4 MG/DL (ref 8.8–10)
CHLORIDE SERPL-SCNC: 96 MMOL/L (ref 98–107)
CO2 SERPL-SCNC: 29 MMOL/L (ref 23–31)
CREAT SERPL-MCNC: 0.86 MG/DL (ref 0.73–1.18)
CREAT/UREA NIT SERPL: 12
EOSINOPHIL # BLD AUTO: 0.1 X10(3)/MCL (ref 0–0.9)
EOSINOPHIL NFR BLD AUTO: 1.2 %
ERYTHROCYTE [DISTWIDTH] IN BLOOD BY AUTOMATED COUNT: 14.8 % (ref 11.5–17)
GFR SERPLBLD CREATININE-BSD FMLA CKD-EPI: >60 MLS/MIN/1.73/M2
GLUCOSE SERPL-MCNC: 125 MG/DL (ref 82–115)
HCT VFR BLD AUTO: 35 % (ref 42–52)
HGB BLD-MCNC: 11.5 G/DL (ref 14–18)
IMM GRANULOCYTES # BLD AUTO: 0.03 X10(3)/MCL (ref 0–0.04)
IMM GRANULOCYTES NFR BLD AUTO: 0.4 %
LYMPHOCYTES # BLD AUTO: 1.52 X10(3)/MCL (ref 0.6–4.6)
LYMPHOCYTES NFR BLD AUTO: 17.8 %
MCH RBC QN AUTO: 26.7 PG (ref 27–31)
MCHC RBC AUTO-ENTMCNC: 32.9 G/DL (ref 33–36)
MCV RBC AUTO: 81.4 FL (ref 80–94)
MONOCYTES # BLD AUTO: 1.01 X10(3)/MCL (ref 0.1–1.3)
MONOCYTES NFR BLD AUTO: 11.8 %
NEUTROPHILS # BLD AUTO: 5.86 X10(3)/MCL (ref 2.1–9.2)
NEUTROPHILS NFR BLD AUTO: 68.4 %
NRBC BLD AUTO-RTO: 0 %
PLATELET # BLD AUTO: 387 X10(3)/MCL (ref 130–400)
PMV BLD AUTO: 9 FL (ref 7.4–10.4)
POTASSIUM SERPL-SCNC: 3.5 MMOL/L (ref 3.5–5.1)
RBC # BLD AUTO: 4.3 X10(6)/MCL (ref 4.7–6.1)
SODIUM SERPL-SCNC: 133 MMOL/L (ref 136–145)
WBC # SPEC AUTO: 8.55 X10(3)/MCL (ref 4.5–11.5)

## 2023-08-06 PROCEDURE — 94640 AIRWAY INHALATION TREATMENT: CPT

## 2023-08-06 PROCEDURE — 80048 BASIC METABOLIC PNL TOTAL CA: CPT | Performed by: HOSPITALIST

## 2023-08-06 PROCEDURE — 25000003 PHARM REV CODE 250: Performed by: NURSE PRACTITIONER

## 2023-08-06 PROCEDURE — 63600175 PHARM REV CODE 636 W HCPCS: Performed by: INTERNAL MEDICINE

## 2023-08-06 PROCEDURE — 25000003 PHARM REV CODE 250: Performed by: INTERNAL MEDICINE

## 2023-08-06 PROCEDURE — 99900035 HC TECH TIME PER 15 MIN (STAT)

## 2023-08-06 PROCEDURE — 85025 COMPLETE CBC W/AUTO DIFF WBC: CPT | Performed by: HOSPITALIST

## 2023-08-06 PROCEDURE — 94761 N-INVAS EAR/PLS OXIMETRY MLT: CPT

## 2023-08-06 PROCEDURE — 25000242 PHARM REV CODE 250 ALT 637 W/ HCPCS: Performed by: INTERNAL MEDICINE

## 2023-08-06 RX ORDER — LEVALBUTEROL INHALATION SOLUTION 1.25 MG/3ML
1 SOLUTION RESPIRATORY (INHALATION) EVERY 8 HOURS PRN
Qty: 270 ML | Refills: 0 | Status: SHIPPED | OUTPATIENT
Start: 2023-08-06 | End: 2023-08-08 | Stop reason: SDUPTHER

## 2023-08-06 RX ORDER — LEVALBUTEROL INHALATION SOLUTION 0.63 MG/3ML
0.63 SOLUTION RESPIRATORY (INHALATION) EVERY 8 HOURS
Qty: 270 ML | Refills: 0 | Status: SHIPPED | OUTPATIENT
Start: 2023-08-06 | End: 2023-09-05

## 2023-08-06 RX ORDER — METOPROLOL SUCCINATE 25 MG/1
75 TABLET, EXTENDED RELEASE ORAL DAILY
Qty: 90 TABLET | Refills: 2 | Status: SHIPPED | OUTPATIENT
Start: 2023-08-06 | End: 2024-03-07

## 2023-08-06 RX ORDER — METOPROLOL SUCCINATE 25 MG/1
75 TABLET, EXTENDED RELEASE ORAL DAILY
Qty: 90 TABLET | Refills: 11 | Status: SHIPPED | OUTPATIENT
Start: 2023-08-06 | End: 2023-08-08 | Stop reason: SDUPTHER

## 2023-08-06 RX ORDER — FUROSEMIDE 20 MG/1
40 TABLET ORAL DAILY
Qty: 60 TABLET | Refills: 11 | Status: SHIPPED | OUTPATIENT
Start: 2023-08-06 | End: 2023-08-08 | Stop reason: SDUPTHER

## 2023-08-06 RX ORDER — FUROSEMIDE 40 MG/1
40 TABLET ORAL DAILY
Qty: 30 TABLET | Refills: 0 | Status: ON HOLD | OUTPATIENT
Start: 2023-08-06 | End: 2023-08-14 | Stop reason: HOSPADM

## 2023-08-06 RX ORDER — FUROSEMIDE 40 MG/1
40 TABLET ORAL DAILY
Qty: 30 TABLET | Refills: 0 | Status: SHIPPED | OUTPATIENT
Start: 2023-08-06 | End: 2023-08-06

## 2023-08-06 RX ORDER — FUROSEMIDE 20 MG/1
40 TABLET ORAL 2 TIMES DAILY
Qty: 120 TABLET | Refills: 11 | Status: SHIPPED | OUTPATIENT
Start: 2023-08-06 | End: 2023-08-06 | Stop reason: SDUPTHER

## 2023-08-06 RX ADMIN — FUROSEMIDE 40 MG: 10 INJECTION, SOLUTION INTRAMUSCULAR; INTRAVENOUS at 08:08

## 2023-08-06 RX ADMIN — LEVALBUTEROL HYDROCHLORIDE 0.63 MG: 0.63 SOLUTION RESPIRATORY (INHALATION) at 08:08

## 2023-08-06 RX ADMIN — METOPROLOL TARTRATE 25 MG: 25 TABLET, FILM COATED ORAL at 08:08

## 2023-08-06 RX ADMIN — METOPROLOL SUCCINATE 50 MG: 50 TABLET, EXTENDED RELEASE ORAL at 12:08

## 2023-08-06 RX ADMIN — RIVAROXABAN 20 MG: 10 TABLET, FILM COATED ORAL at 01:08

## 2023-08-06 NOTE — DISCHARGE SUMMARY
[facility] Discharge Summary    Admit Date: 8/4/2023  Discharge Date and Time: 8/6/202310:17 AM  Admitting Physician: Hospitalist team   Discharging Physician: Giovanny Salgado MD.  Primary Care Physician: [facility]      Discharge Diagnoses:  Newly diagnosed decompensated systolic heart failure   Atrial fibrillation with RVR      HX: HTN, HLD, chronic pain     Hospital Course:   88-year-old male who has a documented prior history of hypertension, hyperlipidemia and chronic pain but was on no prescribed medications until yesterday when he presented to the ER complaining of shortness of breath.  He reported his symptoms had worsened over many months but has been present for over a year.  He was a difficult historian and very adamant about not being on many medications.  He was seen by tele Cardiology and was found to have new onset AFib with RVR.  He was given a dose of IV Lasix and metoprolol but ultimately left that facility AMA but he was given prescriptions for both these medications.  He presented back to the ER today for worsening symptoms.  He also complained of chest pain today.       On arrival to the ER patient was afebrile and hemodynamically stable satting in the low 90s on room air.  Laboratory work was only significant for elevated BNP.  Chest x-ray again noted cardiomegaly and hazy interstitial opacities in both lungs concerning for pulmonary edema.  EKG showed AFib with RVR and a right bundle-branch block.  He was given full-dose Lovenox, IV Lasix and admitted to the hospitalist service.    Patient was evaluated by Cardiology.  He was noted to be in atrial fibrillation.  Started on metoprolol 75 mg daily.  Heart rate is better controlled and he was asymptomatic.  Echocardiogram revealed mild systolic dysfunction.  He had an EF of 45-50%.  Cardiology plans to follow up further in the clinic the recommending that he continue daily Lasix in the  "meantime.  He has been started on Xarelto 20 mg daily.  He was counseled repeatedly on the importance of compliance with medications and follow up with his PCP and Cardiology.  His son is at the bedside for conversation regarding discharge plan.    Vitals:  Blood pressure (!) 152/88, pulse 103, temperature 98.1 °F (36.7 °C), resp. rate (!) 22, height 5' 10.08" (1.78 m), weight 79.4 kg (175 lb 0.7 oz), SpO2 95 %..    Physical Exam:  Awake, Alert, Oriented x 3, No new focal Neurologic deficit, Normal Affect  NC/AT, PERRLA, EOMI  Supple neck, no JVD, No cervical lymphadenopathy  Symmetrical chest, Good air entry bilaterally. No rhonchi, wheezes, crackles appreciated  RRR, No gallop, rub or murmur  +ve Bowel sounds x4, Abd soft and non tender, no rebound, guarding or rigidity  No Cyanosis, cludding or edema, No new rash or bruises    Procedures Performed: No admission procedures for hospital encounter.     Significant Diagnostic Studies: See Full reports for all details  Admission on 08/04/2023   Component Date Value    Sodium Level 08/04/2023 137     Potassium Level 08/04/2023 3.8     Chloride 08/04/2023 99     Carbon Dioxide 08/04/2023 28     Glucose Level 08/04/2023 190 (H)     Blood Urea Nitrogen 08/04/2023 12.1     Creatinine 08/04/2023 1.12     Calcium Level Total 08/04/2023 9.3     Protein Total 08/04/2023 7.4     Albumin Level 08/04/2023 3.3 (L)     Globulin 08/04/2023 4.1 (H)     Albumin/Globulin Ratio 08/04/2023 0.8 (L)     Bilirubin Total 08/04/2023 0.7     Alkaline Phosphatase 08/04/2023 63     Alanine Aminotransferase 08/04/2023 21     Aspartate Aminotransfera* 08/04/2023 21     eGFR 08/04/2023 >60     Troponin-I 08/04/2023 0.022     Natriuretic Peptide 08/04/2023 298.6 (H)     Color, UA 08/04/2023 Straw     Appearance, UA 08/04/2023 Clear     Specific Gravity, UA 08/04/2023 1.010     pH, UA 08/04/2023 6.0     Protein, UA 08/04/2023 Negative     Glucose, UA 08/04/2023 Negative     Ketones, UA 08/04/2023 " Negative     Blood, UA 08/04/2023 Negative     Bilirubin, UA 08/04/2023 Negative     Urobilinogen, UA 08/04/2023 0.2     Nitrites, UA 08/04/2023 Negative     Leukocyte Esterase, UA 08/04/2023 Trace (A)     WBC 08/04/2023 9.55     RBC 08/04/2023 4.53 (L)     Hgb 08/04/2023 12.5 (L)     Hct 08/04/2023 39.1 (L)     MCV 08/04/2023 86.3     MCH 08/04/2023 27.6     MCHC 08/04/2023 32.0 (L)     RDW 08/04/2023 14.9     Platelet 08/04/2023 416 (H)     MPV 08/04/2023 9.0     Neut % 08/04/2023 80.1     Lymph % 08/04/2023 11.6     Mono % 08/04/2023 7.3     Eos % 08/04/2023 0.4     Basophil % 08/04/2023 0.4     Lymph # 08/04/2023 1.11     Neut # 08/04/2023 7.64     Mono # 08/04/2023 0.70     Eos # 08/04/2023 0.04     Baso # 08/04/2023 0.04     IG# 08/04/2023 0.02     IG% 08/04/2023 0.2     NRBC% 08/04/2023 0.0     PT 08/04/2023 16.2 (H)     INR 08/04/2023 1.3 (L)     PTT 08/04/2023 34.1 (H)     Creatine Kinase 08/04/2023 147     Influenza A PCR 08/04/2023 Not Detected     Influenza B PCR 08/04/2023 Not Detected     Respiratory Syncytial Vi* 08/04/2023 Not Detected     SARS-CoV-2 PCR 08/04/2023 Not Detected     Bacteria, UA 08/04/2023 None Seen     RBC, UA 08/04/2023 None Seen     WBC, UA 08/04/2023 None Seen     Squamous Epithelial Cell* 08/04/2023 None Seen     BSA 08/05/2023 1.98     Jenkins's Biplane MOD Ej* 08/05/2023 5     LVOT stroke volume 08/05/2023 72.19     LVIDd 08/05/2023 5.17     LV Systolic Volume 08/05/2023 57.80     LV Systolic Volume Index 08/05/2023 29.3     LVIDs 08/05/2023 3.69     LV Diastolic Volume 08/05/2023 128.00     LV Diastolic Volume Index 08/05/2023 64.97     IVS 08/05/2023 1.29 (A)     LVOT diameter 08/05/2023 2.20     LVOT area 08/05/2023 3.8     FS 08/05/2023 29     Left Ventricle Relative * 08/05/2023 0.43     Posterior Wall 08/05/2023 1.11 (A)     LV mass 08/05/2023 246.55     LV Mass Index 08/05/2023 125     MV Peak E Tyree 08/05/2023 1.07     TR Max Tyree 08/05/2023 2.71     LVOT peak tyree  08/05/2023 1.27     Left Ventricular Outflow* 08/05/2023 0.80     Left Ventricular Outflow* 08/05/2023 3.00     LA volume (mod) 08/05/2023 121.00     LA Volume Index (Mod) 08/05/2023 61.4     LA size 08/05/2023 5.00     TAPSE 08/05/2023 1.88     RA Major Axis 08/05/2023 6.76     RA Width 08/05/2023 4.86     AV mean gradient 08/05/2023 7     AV peak gradient 08/05/2023 14     Ao peak grecia 08/05/2023 1.84     Ao VTI 08/05/2023 26.00     LVOT peak VTI 08/05/2023 19.00     AV valve area 08/05/2023 2.78     AV Velocity Ratio 08/05/2023 0.69     AV index (prosthetic) 08/05/2023 0.73     NGUYỄN by Velocity Ratio 08/05/2023 2.62     MV mean gradient 08/05/2023 1     MV peak gradient 08/05/2023 2     MV valve area by continu* 08/05/2023 9.76     MV VTI 08/05/2023 7.4     Triscuspid Valve Regurgi* 08/05/2023 29     ZLVIDS 08/05/2023 0.46     ZLVIDD 08/05/2023 -0.91     RVDD 08/05/2023 4.12     Troponin-I 08/04/2023 0.020     Troponin-I 08/04/2023 0.021     Sodium Level 08/05/2023 135 (L)     Potassium Level 08/05/2023 3.3 (L)     Chloride 08/05/2023 97 (L)     Carbon Dioxide 08/05/2023 28     Glucose Level 08/05/2023 120 (H)     Blood Urea Nitrogen 08/05/2023 9.4     Creatinine 08/05/2023 0.89     Calcium Level Total 08/05/2023 8.7 (L)     Protein Total 08/05/2023 6.7     Albumin Level 08/05/2023 2.9 (L)     Globulin 08/05/2023 3.8 (H)     Albumin/Globulin Ratio 08/05/2023 0.8 (L)     Bilirubin Total 08/05/2023 0.7     Alkaline Phosphatase 08/05/2023 55     Alanine Aminotransferase 08/05/2023 16     Aspartate Aminotransfera* 08/05/2023 17     eGFR 08/05/2023 >60     WBC 08/05/2023 8.58     RBC 08/05/2023 4.40 (L)     Hgb 08/05/2023 11.9 (L)     Hct 08/05/2023 36.8 (L)     MCV 08/05/2023 83.6     MCH 08/05/2023 27.0     MCHC 08/05/2023 32.3 (L)     RDW 08/05/2023 15.0     Platelet 08/05/2023 401 (H)     MPV 08/05/2023 9.1     Neut % 08/05/2023 67.7     Lymph % 08/05/2023 18.3     Mono % 08/05/2023 12.5     Eos % 08/05/2023 0.7      Basophil % 08/05/2023 0.6     Lymph # 08/05/2023 1.57     Neut # 08/05/2023 5.81     Mono # 08/05/2023 1.07     Eos # 08/05/2023 0.06     Baso # 08/05/2023 0.05     IG# 08/05/2023 0.02     IG% 08/05/2023 0.2     NRBC% 08/05/2023 0.0     Sodium Level 08/06/2023 133 (L)     Potassium Level 08/06/2023 3.5     Chloride 08/06/2023 96 (L)     Carbon Dioxide 08/06/2023 29     Glucose Level 08/06/2023 125 (H)     Blood Urea Nitrogen 08/06/2023 9.9     Creatinine 08/06/2023 0.86     BUN/Creatinine Ratio 08/06/2023 12     Calcium Level Total 08/06/2023 8.4 (L)     Anion Gap 08/06/2023 8.0     eGFR 08/06/2023 >60     WBC 08/06/2023 8.55     RBC 08/06/2023 4.30 (L)     Hgb 08/06/2023 11.5 (L)     Hct 08/06/2023 35.0 (L)     MCV 08/06/2023 81.4     MCH 08/06/2023 26.7 (L)     MCHC 08/06/2023 32.9 (L)     RDW 08/06/2023 14.8     Platelet 08/06/2023 387     MPV 08/06/2023 9.0     Neut % 08/06/2023 68.4     Lymph % 08/06/2023 17.8     Mono % 08/06/2023 11.8     Eos % 08/06/2023 1.2     Basophil % 08/06/2023 0.4     Lymph # 08/06/2023 1.52     Neut # 08/06/2023 5.86     Mono # 08/06/2023 1.01     Eos # 08/06/2023 0.10     Baso # 08/06/2023 0.03     IG# 08/06/2023 0.03     IG% 08/06/2023 0.4     NRBC% 08/06/2023 0.0         Microbiology Results (last 7 days)       ** No results found for the last 168 hours. **             Echo    Result Date: 8/5/2023    Left Ventricle: The left ventricle is mildly dilated. Normal wall thickness. Normal wall motion. There is mildly reduced systolic function with a visually estimated ejection fraction of 45 - 50%. Unable to assess diastolic function due to arrhythmia. Elevated left ventricular filling pressure.   Left Atrium: Left atrium is severely dilated.   Right Ventricle: Mild right ventricular enlargement. Systolic function is normal.   Right Atrium: Right atrium is severely dilated.   Aortic Valve: The aortic valve is a trileaflet valve. Mildly restricted motion.   Mitral Valve: Moderately  thickened leaflets. There is mild regurgitation.   Pulmonic Valve: There is mild regurgitation.   Pericardium: Small circumferential pericardial effusion present. No indication of cardiac tamponade.     X-Ray Chest AP Portable    Result Date: 8/4/2023  EXAMINATION: XR CHEST AP PORTABLE CLINICAL HISTORY: Chest Pain; TECHNIQUE: Single frontal view of the chest was performed. COMPARISON: August 3, 2023 FINDINGS: Examination reveals cardiomediastinal silhouette and pleuroparenchymal changes to be essentially unchanged as compared with the previous exam     No significant change Electronically signed by: Mundo Watts Date:    08/04/2023 Time:    09:46    X-ray Chest AP Portable    Result Date: 8/3/2023  EXAMINATION: XR CHEST AP PORTABLE CLINICAL HISTORY: Shortness of breath;, . COMPARISON: None available FINDINGS: An AP view or more reveals the heart to be enlarged.  The trachea is midline.  Mild hazy interstitial opacities evident the lungs bilaterally diffusely throughout.  The left hemidiaphragm is obscured.  Degenerative changes are evident at the thoracic spine.  Atherosclerosis is seen within the aorta.     1. Cardiomegaly 2. Obscured left hemidiaphragm suspicious for left basilar infiltrate and/or pleural reaction 3. Hazy interstitial opacities throughout the lungs bilaterally which may represent interstitial pulmonary edema versus chronic interstitial changes.  Clinical correlation is indicated 4. Atherosclerosis Electronically signed by: Ceasar Pang Date:    08/03/2023 Time:    14:13  - pulls last radiology orders        Medication List        START taking these medications      levalbuterol 0.63 mg/3 mL nebulizer solution  Commonly known as: XOPENEX  Take 3 mLs (0.63 mg total) by nebulization every 8 (eight) hours. Rescue     rivaroxaban 20 mg Tab  Commonly known as: XARELTO  Take 1 tablet (20 mg total) by mouth daily with dinner or evening meal.            CHANGE how you take these medications       furosemide 20 MG tablet  Commonly known as: LASIX  Take 2 tablets (40 mg total) by mouth 2 (two) times daily.  What changed: how much to take     * metoprolol succinate 25 MG 24 hr tablet  Commonly known as: TOPROL-XL  Take 1 tablet (25 mg total) by mouth once daily.  What changed: Another medication with the same name was added. Make sure you understand how and when to take each.     * metoprolol succinate 25 MG 24 hr tablet  Commonly known as: TOPROL-XL  Take 3 tablets (75 mg total) by mouth once daily.  What changed: You were already taking a medication with the same name, and this prescription was added. Make sure you understand how and when to take each.           * This list has 2 medication(s) that are the same as other medications prescribed for you. Read the directions carefully, and ask your doctor or other care provider to review them with you.                CONTINUE taking these medications      hawthorn 500 mg Cap               Where to Get Your Medications        These medications were sent to [id number] - [place], [place] - [address] AT [facility] & [facility] [address] [place] [address]      Phone: [phone]   furosemide 20 MG tablet  levalbuterol 0.63 mg/3 mL nebulizer solution  metoprolol succinate 25 MG 24 hr tablet  rivaroxaban 20 mg Tab          Explained in detail to the patient about the discharge plan, medications, and follow-up visits. Pt understands and agrees with the treatment plan  Discharged Condition: stable  Diet: cardiac  Disposition: home    Medications Per [place] med rec  Activities as tolerated  Follow up with your PCP in 2 wks   For further questions contact hospitalist office    Discharge time 33 minutes    For worsening symptoms, chest pain, shortness of breath, increased abdominal pain, high grade fever, stroke or stroke like symptoms, immediately go to the nearest Emergency Room or call 911 as soon as possible.      Giovanny Salgado.  M.D, on 8/6/2023. at 10:17 AM.

## 2023-08-06 NOTE — PROGRESS NOTES
[facility] [place] - 6th Floor Medical Telemetry    Cardiology  Progress Note    Patient Name: Lizandro Martinez  MRN: [id number]  Admission Date: 8/4/2023  Hospital Length of Stay: 2 days  Code Status: Full Code   Attending Physician: Giovanny Salgado MD   Primary Care Physician: Edward, [facility]  Expected Discharge Date: 8/6/2023  Principal Problem:Atrial fibrillation    Subjective:     Brief HPI:   This is an 88 year old male, who is unknown to [facility] except though previosu tele cardiology visit on 8.3.23, with a history of HTN, HLD, chronic pain. He presented to the ER on 8.5.23 with complaints of SOB with productive cough that has been ongoing for 1 month and L chest wall pain. Patient presented to [facility] on 8.3.23 with the same complaints. On this hospital stay he was found to have a new onset of Afib RVR however he denied wanting to start OAC when CVA risk reduction was discussed. ED course: WBC 9.55, H/H 12.5/39.1, plt 416, INR 1.3, K 3.8, .2, Trop 0.026 --> 0.021. EKG reveals Afib CVR. [facility] consulted for new onset afib & CHF    PMH: HTN, HLD, chronic pain  PSH: Repair of tibia fracture  Social History: Current EtOH use (approximately 6 pack of beer per day), denies tobacco and illicit drug use  Family History:  Mother-HTN, heart disease, HLD, Cancer, COPD; Father-dementia    Hospital Course:   8.6.23: Patient denies SOB, CP, or palpitations     Review of Systems   Constitutional: Negative for chills and fever.   HENT: Negative.     Cardiovascular:  Negative for chest pain and leg swelling.   Respiratory:  Negative for cough and shortness of breath.    Skin: Negative.    Musculoskeletal: Negative.    Gastrointestinal:  Negative for abdominal pain.   Psychiatric/Behavioral:  Negative for altered mental status.        Objective:     Vital Signs (Most Recent):  Temp: 98.1 °F (36.7 °C) (08/06/23 0800)  Pulse: 103 (08/06/23 0837)  Resp: (!) 22 (08/06/23 0837)  BP: (!) 152/88 (08/06/23  0800)  SpO2: 95 % (08/06/23 0837) Vital Signs (24h Range):  Temp:  [98 °F (36.7 °C)-99.3 °F (37.4 °C)] 98.1 °F (36.7 °C)  Pulse:  [] 103  Resp:  [16-22] 22  SpO2:  [92 %-98 %] 95 %  BP: (138-153)/(61-92) 152/88     Weight: 79.4 kg (175 lb 0.7 oz)  Body mass index is 25.06 kg/m².    SpO2: 95 %         Intake/Output Summary (Last 24 hours) at 8/6/2023 1044  Last data filed at 8/6/2023 0647  Gross per 24 hour   Intake 320 ml   Output 1200 ml   Net -880 ml       Lines/Drains/Airways       None                   Significant Labs:   Recent Results (from the past 72 hour(s))   Comprehensive metabolic panel    Collection Time: 08/03/23 12:16 PM   Result Value Ref Range    Sodium Level 136 136 - 145 mmol/L    Potassium Level 4.6 3.5 - 5.1 mmol/L    Chloride 100 98 - 107 mmol/L    Carbon Dioxide 28 23 - 31 mmol/L    Glucose Level 126 (H) 82 - 115 mg/dL    Blood Urea Nitrogen 10.0 8.4 - 25.7 mg/dL    Creatinine 1.10 0.73 - 1.18 mg/dL    Calcium Level Total 9.1 8.8 - 10.0 mg/dL    Protein Total 7.1 5.8 - 7.6 gm/dL    Albumin Level 3.1 (L) 3.4 - 4.8 g/dL    Globulin 4.0 (H) 2.4 - 3.5 gm/dL    Albumin/Globulin Ratio 0.8 (L) 1.1 - 2.0 ratio    Bilirubin Total 0.9 <=1.5 mg/dL    Alkaline Phosphatase 58 40 - 150 unit/L    Alanine Aminotransferase 16 0 - 55 unit/L    Aspartate Aminotransferase 19 5 - 34 unit/L    eGFR >60 mls/min/1.73/m2   Troponin I    Collection Time: 08/03/23 12:16 PM   Result Value Ref Range    Troponin-I 0.026 0.000 - 0.045 ng/mL   Brain natriuretic peptide    Collection Time: 08/03/23 12:16 PM   Result Value Ref Range    Natriuretic Peptide 315.2 (H) <=100.0 pg/mL   CBC with Differential    Collection Time: 08/03/23 12:16 PM   Result Value Ref Range    WBC 8.58 4.50 - 11.50 x10(3)/mcL    RBC 4.51 (L) 4.70 - 6.10 x10(6)/mcL    Hgb 12.2 (L) 14.0 - 18.0 g/dL    Hct 39.1 (L) 42.0 - 52.0 %    MCV 86.7 80.0 - 94.0 fL    MCH 27.1 27.0 - 31.0 pg    MCHC 31.2 (L) 33.0 - 36.0 g/dL    RDW 15.1 11.5 - 17.0 %     Platelet 440 (H) 130 - 400 x10(3)/mcL    MPV 9.0 7.4 - 10.4 fL    Neut % 75.5 %    Lymph % 13.6 %    Mono % 10.1 %    Eos % 0.2 %    Basophil % 0.3 %    Lymph # 1.17 0.6 - 4.6 x10(3)/mcL    Neut # 6.46 2.1 - 9.2 x10(3)/mcL    Mono # 0.87 0.1 - 1.3 x10(3)/mcL    Eos # 0.02 0 - 0.9 x10(3)/mcL    Baso # 0.03 <=0.2 x10(3)/mcL    IG# 0.03 0 - 0.04 x10(3)/mcL    IG% 0.3 %   Ethanol    Collection Time: 08/03/23 12:16 PM   Result Value Ref Range    Ethanol Level <10.0 <=10.0 mg/dL   Comprehensive metabolic panel    Collection Time: 08/04/23  9:05 AM   Result Value Ref Range    Sodium Level 137 136 - 145 mmol/L    Potassium Level 3.8 3.5 - 5.1 mmol/L    Chloride 99 98 - 107 mmol/L    Carbon Dioxide 28 23 - 31 mmol/L    Glucose Level 190 (H) 82 - 115 mg/dL    Blood Urea Nitrogen 12.1 8.4 - 25.7 mg/dL    Creatinine 1.12 0.73 - 1.18 mg/dL    Calcium Level Total 9.3 8.8 - 10.0 mg/dL    Protein Total 7.4 5.8 - 7.6 gm/dL    Albumin Level 3.3 (L) 3.4 - 4.8 g/dL    Globulin 4.1 (H) 2.4 - 3.5 gm/dL    Albumin/Globulin Ratio 0.8 (L) 1.1 - 2.0 ratio    Bilirubin Total 0.7 <=1.5 mg/dL    Alkaline Phosphatase 63 40 - 150 unit/L    Alanine Aminotransferase 21 0 - 55 unit/L    Aspartate Aminotransferase 21 5 - 34 unit/L    eGFR >60 mls/min/1.73/m2   Troponin I    Collection Time: 08/04/23  9:05 AM   Result Value Ref Range    Troponin-I 0.022 0.000 - 0.045 ng/mL   BNP    Collection Time: 08/04/23  9:05 AM   Result Value Ref Range    Natriuretic Peptide 298.6 (H) <=100.0 pg/mL   CBC with Differential    Collection Time: 08/04/23  9:05 AM   Result Value Ref Range    WBC 9.55 4.50 - 11.50 x10(3)/mcL    RBC 4.53 (L) 4.70 - 6.10 x10(6)/mcL    Hgb 12.5 (L) 14.0 - 18.0 g/dL    Hct 39.1 (L) 42.0 - 52.0 %    MCV 86.3 80.0 - 94.0 fL    MCH 27.6 27.0 - 31.0 pg    MCHC 32.0 (L) 33.0 - 36.0 g/dL    RDW 14.9 11.5 - 17.0 %    Platelet 416 (H) 130 - 400 x10(3)/mcL    MPV 9.0 7.4 - 10.4 fL    Neut % 80.1 %    Lymph % 11.6 %    Mono % 7.3 %    Eos % 0.4 %     Basophil % 0.4 %    Lymph # 1.11 0.6 - 4.6 x10(3)/mcL    Neut # 7.64 2.1 - 9.2 x10(3)/mcL    Mono # 0.70 0.1 - 1.3 x10(3)/mcL    Eos # 0.04 0 - 0.9 x10(3)/mcL    Baso # 0.04 <=0.2 x10(3)/mcL    IG# 0.02 0 - 0.04 x10(3)/mcL    IG% 0.2 %    NRBC% 0.0 %   Protime-INR    Collection Time: 08/04/23  9:05 AM   Result Value Ref Range    PT 16.2 (H) 11.7 - 14.5 seconds    INR 1.3 (L) 2.0 - 3.0   APTT    Collection Time: 08/04/23  9:05 AM   Result Value Ref Range    PTT 34.1 (H) 23.4 - 33.9 seconds   CK    Collection Time: 08/04/23  9:05 AM   Result Value Ref Range    Creatine Kinase 147 30 - 200 U/L   COVID/RSV/FLU A&B PCR    Collection Time: 08/04/23  9:05 AM   Result Value Ref Range    Influenza A PCR Not Detected Not Detected    Influenza B PCR Not Detected Not Detected    Respiratory Syncytial Virus PCR Not Detected Not Detected    SARS-CoV-2 PCR Not Detected Not Detected, Negative, Invalid   Urinalysis, Reflex to Urine Culture    Collection Time: 08/04/23 12:54 PM    Specimen: Urine, Clean Catch   Result Value Ref Range    Color, UA Straw Yellow, Light-Yellow, Dark Yellow, Viki, Straw    Appearance, UA Clear Clear    Specific Gravity, UA 1.010     pH, UA 6.0 5.0 - 8.5    Protein, UA Negative Negative    Glucose, UA Negative Negative, Normal    Ketones, UA Negative Negative    Blood, UA Negative Negative    Bilirubin, UA Negative Negative    Urobilinogen, UA 0.2 0.2, 1.0, Normal    Nitrites, UA Negative Negative    Leukocyte Esterase, UA Trace (A) Negative   Urinalysis, Microscopic    Collection Time: 08/04/23 12:54 PM   Result Value Ref Range    Bacteria, UA None Seen None Seen, Rare, Occasional /HPF    RBC, UA None Seen None Seen, 0-2, 3-5, 0-5 /HPF    WBC, UA None Seen None Seen, 0-2, 3-5, 0-5 /HPF    Squamous Epithelial Cells, UA None Seen None Seen, Rare, Occasional, Occ /HPF   Troponin I    Collection Time: 08/04/23  5:29 PM   Result Value Ref Range    Troponin-I 0.020 0.000 - 0.045 ng/mL   Troponin I     Collection Time: 08/04/23 11:31 PM   Result Value Ref Range    Troponin-I 0.021 0.000 - 0.045 ng/mL   Comprehensive Metabolic Panel    Collection Time: 08/05/23  6:44 AM   Result Value Ref Range    Sodium Level 135 (L) 136 - 145 mmol/L    Potassium Level 3.3 (L) 3.5 - 5.1 mmol/L    Chloride 97 (L) 98 - 107 mmol/L    Carbon Dioxide 28 23 - 31 mmol/L    Glucose Level 120 (H) 82 - 115 mg/dL    Blood Urea Nitrogen 9.4 8.4 - 25.7 mg/dL    Creatinine 0.89 0.73 - 1.18 mg/dL    Calcium Level Total 8.7 (L) 8.8 - 10.0 mg/dL    Protein Total 6.7 5.8 - 7.6 gm/dL    Albumin Level 2.9 (L) 3.4 - 4.8 g/dL    Globulin 3.8 (H) 2.4 - 3.5 gm/dL    Albumin/Globulin Ratio 0.8 (L) 1.1 - 2.0 ratio    Bilirubin Total 0.7 <=1.5 mg/dL    Alkaline Phosphatase 55 40 - 150 unit/L    Alanine Aminotransferase 16 0 - 55 unit/L    Aspartate Aminotransferase 17 5 - 34 unit/L    eGFR >60 mls/min/1.73/m2   CBC with Differential    Collection Time: 08/05/23  6:44 AM   Result Value Ref Range    WBC 8.58 4.50 - 11.50 x10(3)/mcL    RBC 4.40 (L) 4.70 - 6.10 x10(6)/mcL    Hgb 11.9 (L) 14.0 - 18.0 g/dL    Hct 36.8 (L) 42.0 - 52.0 %    MCV 83.6 80.0 - 94.0 fL    MCH 27.0 27.0 - 31.0 pg    MCHC 32.3 (L) 33.0 - 36.0 g/dL    RDW 15.0 11.5 - 17.0 %    Platelet 401 (H) 130 - 400 x10(3)/mcL    MPV 9.1 7.4 - 10.4 fL    Neut % 67.7 %    Lymph % 18.3 %    Mono % 12.5 %    Eos % 0.7 %    Basophil % 0.6 %    Lymph # 1.57 0.6 - 4.6 x10(3)/mcL    Neut # 5.81 2.1 - 9.2 x10(3)/mcL    Mono # 1.07 0.1 - 1.3 x10(3)/mcL    Eos # 0.06 0 - 0.9 x10(3)/mcL    Baso # 0.05 <=0.2 x10(3)/mcL    IG# 0.02 0 - 0.04 x10(3)/mcL    IG% 0.2 %    NRBC% 0.0 %   Echo    Collection Time: 08/05/23 10:02 AM   Result Value Ref Range    BSA 1.98 m2    Jenkins's Biplane MOD Ejection Fraction 5 %    LVOT stroke volume 72.19 cm3    LVIDd 5.17 3.5 - 6.0 cm    LV Systolic Volume 57.80 mL    LV Systolic Volume Index 29.3 mL/m2    LVIDs 3.69 2.1 - 4.0 cm    LV Diastolic Volume 128.00 mL    LV Diastolic  Volume Index 64.97 mL/m2    IVS 1.29 (A) 0.6 - 1.1 cm    LVOT diameter 2.20 cm    LVOT area 3.8 cm2    FS 29 28 - 44 %    Left Ventricle Relative Wall Thickness 0.43 cm    Posterior Wall 1.11 (A) 0.6 - 1.1 cm    LV mass 246.55 g    LV Mass Index 125 g/m2    MV Peak E Tyree 1.07 m/s    TR Max Tyree 2.71 m/s    LVOT peak tyree 1.27 m/s    Left Ventricular Outflow Tract Mean Velocity 0.80 cm/s    Left Ventricular Outflow Tract Mean Gradient 3.00 mmHg    LA volume (mod) 121.00 cm3    LA Volume Index (Mod) 61.4 mL/m2    LA size 5.00 cm    TAPSE 1.88 cm    RA Major Axis 6.76 cm    RA Width 4.86 cm    AV mean gradient 7 mmHg    AV peak gradient 14 mmHg    Ao peak tyree 1.84 m/s    Ao VTI 26.00 cm    LVOT peak VTI 19.00 cm    AV valve area 2.78 cm²    AV Velocity Ratio 0.69     AV index (prosthetic) 0.73     NGUYỄN by Velocity Ratio 2.62 cm²    MV mean gradient 1 mmHg    MV peak gradient 2 mmHg    MV valve area by continuity eq 9.76 cm2    MV VTI 7.4 cm    Triscuspid Valve Regurgitation Peak Gradient 29 mmHg    ZLVIDS 0.46     ZLVIDD -0.91     RVDD 4.12 cm   Basic Metabolic Panel    Collection Time: 08/06/23  6:24 AM   Result Value Ref Range    Sodium Level 133 (L) 136 - 145 mmol/L    Potassium Level 3.5 3.5 - 5.1 mmol/L    Chloride 96 (L) 98 - 107 mmol/L    Carbon Dioxide 29 23 - 31 mmol/L    Glucose Level 125 (H) 82 - 115 mg/dL    Blood Urea Nitrogen 9.9 8.4 - 25.7 mg/dL    Creatinine 0.86 0.73 - 1.18 mg/dL    BUN/Creatinine Ratio 12     Calcium Level Total 8.4 (L) 8.8 - 10.0 mg/dL    Anion Gap 8.0 mEq/L    eGFR >60 mls/min/1.73/m2   CBC with Differential    Collection Time: 08/06/23  6:24 AM   Result Value Ref Range    WBC 8.55 4.50 - 11.50 x10(3)/mcL    RBC 4.30 (L) 4.70 - 6.10 x10(6)/mcL    Hgb 11.5 (L) 14.0 - 18.0 g/dL    Hct 35.0 (L) 42.0 - 52.0 %    MCV 81.4 80.0 - 94.0 fL    MCH 26.7 (L) 27.0 - 31.0 pg    MCHC 32.9 (L) 33.0 - 36.0 g/dL    RDW 14.8 11.5 - 17.0 %    Platelet 387 130 - 400 x10(3)/mcL    MPV 9.0 7.4 - 10.4 fL     Neut % 68.4 %    Lymph % 17.8 %    Mono % 11.8 %    Eos % 1.2 %    Basophil % 0.4 %    Lymph # 1.52 0.6 - 4.6 x10(3)/mcL    Neut # 5.86 2.1 - 9.2 x10(3)/mcL    Mono # 1.01 0.1 - 1.3 x10(3)/mcL    Eos # 0.10 0 - 0.9 x10(3)/mcL    Baso # 0.03 <=0.2 x10(3)/mcL    IG# 0.03 0 - 0.04 x10(3)/mcL    IG% 0.4 %    NRBC% 0.0 %       Telemetry:  NSR    Physical Exam  Constitutional:       Appearance: Normal appearance.   HENT:      Mouth/Throat:      Mouth: Mucous membranes are moist.   Eyes:      Pupils: Pupils are equal, round, and reactive to light.   Cardiovascular:      Rate and Rhythm: Normal rate and regular rhythm.      Pulses: Normal pulses.      Heart sounds: Normal heart sounds. No murmur heard.  Abdominal:      General: Abdomen is flat.      Palpations: Abdomen is soft.   Neurological:      Mental Status: He is alert.   Psychiatric:         Mood and Affect: Mood normal.         Behavior: Behavior normal.         Current Inpatient Medications:    Current Facility-Administered Medications:     acetaminophen tablet 1,000 mg, 1,000 mg, Oral, Q6H PRN, Debra Gutierrez, CHARLIE    acetaminophen tablet 650 mg, 650 mg, Oral, Q4H PRN, Debra Gutierrez FNP    aluminum-magnesium hydroxide-simethicone 200-200-20 mg/5 mL suspension 30 mL, 30 mL, Oral, QID PRN, Debra Gutierrez FNP    benzonatate capsule 100 mg, 100 mg, Oral, TID PRN, Debra Gutierrez, MARSHAP    dextromethorphan-guaiFENesin  mg/5 ml liquid 5 mL, 5 mL, Oral, Q4H PRN, Debra Gutierrez FNP, 5 mL at 08/04/23 1722    furosemide injection 40 mg, 40 mg, Intravenous, Q12H, Luc Kwon MD, 40 mg at 08/06/23 0819    hydrALAZINE injection 20 mg, 20 mg, Intravenous, Q4H PRN, Adolfo Lynn, NP    levalbuterol nebulizer solution 0.63 mg, 0.63 mg, Nebulization, Q8H, Kurtis Moon MD, 0.63 mg at 08/06/23 0837    melatonin tablet 6 mg, 6 mg, Oral, Nightly PRN, Debra Gutierrez, MARSHAP    metoprolol injection 5 mg, 5 mg,  Intravenous, Q15 Min PRN, Adolfo Lynn., NP    metoprolol succinate 24 hr tablet 75 mg, 75 mg, Oral, Daily, Kaleb Skelton MD    naloxone 0.4 mg/mL injection 0.02 mg, 0.02 mg, Intravenous, PRN, Debra Gutierrez, FNP    ondansetron injection 4 mg, 4 mg, Intravenous, Q4H PRN, Debra Gutierrez, CHARLIE    prochlorperazine injection Soln 5 mg, 5 mg, Intravenous, Q6H PRN, Debra Gutierrez, FNP    rivaroxaban tablet 20 mg, 20 mg, Oral, Daily with dinner, Farideh Carbone NP, 20 mg at 08/05/23 1658    simethicone chewable tablet 80 mg, 1 tablet, Oral, QID PRN, Debra Gutierrez, CHARLIE    sodium chloride 0.9% flush 10 mL, 10 mL, Intravenous, PRN, Debra Gutierrez, MARSHAP    VTE Risk Mitigation (From admission, onward)           Ordered     rivaroxaban tablet 20 mg  With dinner         08/05/23 1024     IP VTE HIGH RISK PATIENT  Once         08/04/23 1700     Place sequential compression device  Until discontinued         08/04/23 1700                    Assessment:   Newly diagnosed Afib with RVR, now with CVR  -Did not feel palpitations  -[id number] 3 (age, HTN)  Shortness of breath  -worsened over the past few months  HTN   - uncontrolled  HLD  -Statin allergy  Daily EtOH use  Chronic pain      Plan:   Cont Toprol XL 25mg daily,  Lasix 40mg daily  Recommend Xarelto 20mg nightly for CVA prophylaxis   Cardiology to sign off, please re consult if needed       Farideh Carbone NP  Cardiology  [facility] - 6th Floor Medical Telemetry  08/06/2023

## 2023-08-06 NOTE — PROGRESS NOTES
[facility] Hospital Medicine Progress Note        Chief Complaint: Inpatient Follow-up     HPI:   88-year-old male who has a documented prior history of hypertension, hyperlipidemia and chronic pain but was on no prescribed medications until yesterday when he presented to the ER complaining of shortness of breath.  He reported his symptoms had worsened over many months but has been present for over a year.  He was a difficult historian and very adamant about not being on many medications.  He was seen by tele Cardiology and was found to have new onset AFib with RVR.  He was given a dose of IV Lasix and metoprolol but ultimately left that facility AMA but he was given prescriptions for both these medications.  He presented back to the ER today for worsening symptoms.  He also complained of chest pain today.     On arrival to the ER patient was afebrile and hemodynamically stable satting in the low 90s on room air.  Laboratory work was only significant for elevated BNP.  Chest x-ray again noted cardiomegaly and hazy interstitial opacities in both lungs concerning for pulmonary edema.  EKG showed AFib with RVR and a right bundle-branch block.  He was given full-dose Lovenox, IV Lasix and admitted to the hospitalist service.     Interval Hx:  Doing well.  Echocardiogram performed yesterday showed mildly depressed systolic function with an EF of 40-50%.  Cardiology has started him on Xarelto and metoprolol.  Vital stable.  Could potentially go home later today if cleared by Cardiology.  Son is at the bedside     Objective/physical exam:  General: In no acute distress, afebrile  Chest: Clear to auscultation bilaterally  Heart: RRR, +S1, S2, no appreciable murmur  Abdomen: Soft, nontender, BS +  MSK: Warm, no lower extremity edema, no clubbing or cyanosis  Neurologic: Alert and oriented x4, Cranial nerve II-XII intact, Strength 5/5 in all 4 extremities    VITAL SIGNS: 24 HRS MIN & MAX LAST    Temp  Min: 98 °F (36.7 °C)  Max: 99.3 °F (37.4 °C) 98 °F (36.7 °C)   BP  Min: 138/92  Max: 153/87 (!) 152/88   Pulse  Min: 75  Max: 109  90   Resp  Min: 16  Max: 20 16   SpO2  Min: 92 %  Max: 97 % (!) 92 %       Recent Labs   Lab 08/03/23 1216 08/04/23  0905 08/05/23  0644   WBC 8.58 9.55 8.58   RBC 4.51* 4.53* 4.40*   HGB 12.2* 12.5* 11.9*   HCT 39.1* 39.1* 36.8*   MCV 86.7 86.3 83.6   MCH 27.1 27.6 27.0   MCHC 31.2* 32.0* 32.3*   RDW 15.1 14.9 15.0   * 416* 401*   MPV 9.0 9.0 9.1       Recent Labs   Lab 08/03/23 1216 08/04/23  0905 08/05/23  0644    137 135*   K 4.6 3.8 3.3*   CO2 28 28 28   BUN 10.0 12.1 9.4   CREATININE 1.10 1.12 0.89   CALCIUM 9.1 9.3 8.7*   ALBUMIN 3.1* 3.3* 2.9*   ALKPHOS 58 63 55   ALT 16 21 16   AST 19 21 17   BILITOT 0.9 0.7 0.7          Microbiology Results (last 7 days)       ** No results found for the last 168 hours. **             See below for Radiology    Scheduled Med:   furosemide (LASIX) injection  40 mg Intravenous Q12H    levalbuterol  0.63 mg Nebulization Q8H    metoprolol tartrate  25 mg Oral BID    rivaroxaban  20 mg Oral Daily with dinner        Continuous Infusions:       PRN Meds:  acetaminophen, acetaminophen, aluminum-magnesium hydroxide-simethicone, benzonatate, dextromethorphan-guaiFENesin  mg/5 ml, hydrALAZINE, melatonin, metoprolol, naloxone, ondansetron, prochlorperazine, simethicone, sodium chloride 0.9%       Assessment/Plan:   Newly diagnosed decompensated systolic heart failure   Atrial fibrillation with RVR      HX: HTN, HLD, chronic pain      Echocardiogram results reviewed above.  Mildly depressed EF.    Continue with IV Lasix per Cardiology.  Plan to discharge home once cleared by Cardiology.    Treatment dose Lovenox has been changed to Xarelto today.    Rate controlled with metoprolol.    Overall medically stable    All diagnosis and differential diagnosis have been reviewed; assessment and plan has been documented; I have  personally reviewed the labs and test results that are presently available; I have reviewed the patients medication list; I have reviewed the consulting providers response and recommendations. I have reviewed or attempted to review medical records based upon their availability    All of the patient's questions have been  addressed and answered. Patient's is agreeable to the above stated plan. I will continue to monitor closely and make adjustments to medical management as needed.  _____________________________________________________________________      Radiology:  Echo    Left Ventricle: The left ventricle is mildly dilated. Normal wall   thickness. Normal wall motion. There is mildly reduced systolic function   with a visually estimated ejection fraction of 45 - 50%. Unable to assess   diastolic function due to arrhythmia. Elevated left ventricular filling   pressure.    Left Atrium: Left atrium is severely dilated.    Right Ventricle: Mild right ventricular enlargement. Systolic function   is normal.    Right Atrium: Right atrium is severely dilated.    Aortic Valve: The aortic valve is a trileaflet valve. Mildly restricted   motion.    Mitral Valve: Moderately thickened leaflets. There is mild   regurgitation.    Pulmonic Valve: There is mild regurgitation.    Pericardium: Small circumferential pericardial effusion present. No   indication of cardiac tamponade.      Giovanny Salgado MD   08/06/2023

## 2023-08-08 ENCOUNTER — PATIENT OUTREACH (OUTPATIENT)
Dept: ADMINISTRATIVE | Facility: CLINIC | Age: 88
End: 2023-08-08
Payer: OTHER GOVERNMENT

## 2023-08-08 RX ORDER — METOPROLOL SUCCINATE 25 MG/1
75 TABLET, EXTENDED RELEASE ORAL DAILY
Qty: 90 TABLET | Refills: 11 | Status: ON HOLD | OUTPATIENT
Start: 2023-08-08 | End: 2023-08-14 | Stop reason: HOSPADM

## 2023-08-08 RX ORDER — LEVALBUTEROL INHALATION SOLUTION 1.25 MG/3ML
1 SOLUTION RESPIRATORY (INHALATION) EVERY 8 HOURS PRN
Qty: 270 ML | Refills: 0 | Status: ON HOLD | OUTPATIENT
Start: 2023-08-08 | End: 2023-08-14 | Stop reason: HOSPADM

## 2023-08-08 RX ORDER — FUROSEMIDE 20 MG/1
40 TABLET ORAL DAILY
Qty: 60 TABLET | Refills: 11 | Status: ON HOLD | OUTPATIENT
Start: 2023-08-08 | End: 2023-08-14 | Stop reason: SDUPTHER

## 2023-08-08 NOTE — TELEPHONE ENCOUNTER
Pt stated taking:  loratadine 10mg,   Magnesium 250mg daily  Fish oil, 1000mg, daily   Mahnaz 550 mg, daily   Zinc 50mg, daily  D3 125mg daily  Multivitamin, daily  Folic acid, daily  Methylene blue four drops daily

## 2023-08-08 NOTE — PROGRESS NOTES
C3 nurse spoke with Lizandro Martinez  for a TCC post hospital discharge follow up call. The patient does not have a scheduled HOSFU appointment with [facility]  within 5-7 days post hospital discharge date 8/6/23. C3 nurse was unable to schedule HOSFU appointment in [facility]. Informed pt to contact pcp for f/u appt. Pt verbalized understanding.

## 2023-08-11 ENCOUNTER — HOSPITAL ENCOUNTER (INPATIENT)
Facility: HOSPITAL | Age: 88
LOS: 7 days | Discharge: HOME HEALTH | DRG: 377 | End: 2023-08-18
Attending: STUDENT IN AN ORGANIZED HEALTH CARE EDUCATION/TRAINING PROGRAM | Admitting: INTERNAL MEDICINE
Payer: OTHER GOVERNMENT

## 2023-08-11 DIAGNOSIS — R07.9 CHEST PAIN: ICD-10-CM

## 2023-08-11 DIAGNOSIS — K92.1 MELENA: Primary | ICD-10-CM

## 2023-08-11 DIAGNOSIS — K92.1 HEMATOCHEZIA: ICD-10-CM

## 2023-08-11 DIAGNOSIS — I31.39 PERICARDIAL EFFUSION: ICD-10-CM

## 2023-08-11 DIAGNOSIS — K92.2 GI BLEED: ICD-10-CM

## 2023-08-11 DIAGNOSIS — I50.9 CONGESTIVE HEART FAILURE, UNSPECIFIED HF CHRONICITY, UNSPECIFIED HEART FAILURE TYPE: ICD-10-CM

## 2023-08-11 DIAGNOSIS — R06.02 SOB (SHORTNESS OF BREATH): ICD-10-CM

## 2023-08-11 DIAGNOSIS — R79.1 ELEVATED INR: ICD-10-CM

## 2023-08-11 LAB
ALBUMIN SERPL-MCNC: 3 G/DL (ref 3.4–4.8)
ALBUMIN/GLOB SERPL: 0.9 RATIO (ref 1.1–2)
ALP SERPL-CCNC: 58 UNIT/L (ref 40–150)
ALT SERPL-CCNC: 27 UNIT/L (ref 0–55)
APPEARANCE UR: CLEAR
AST SERPL-CCNC: 20 UNIT/L (ref 5–34)
BACTERIA #/AREA URNS AUTO: NORMAL /HPF
BASOPHILS # BLD AUTO: 0.03 X10(3)/MCL
BASOPHILS NFR BLD AUTO: 0.4 %
BILIRUB SERPL-MCNC: 0.6 MG/DL
BILIRUB UR QL STRIP.AUTO: NEGATIVE
BNP BLD-MCNC: 366.6 PG/ML
BUN SERPL-MCNC: 16.6 MG/DL (ref 8.4–25.7)
CALCIUM SERPL-MCNC: 8.6 MG/DL (ref 8.8–10)
CHLORIDE SERPL-SCNC: 102 MMOL/L (ref 98–107)
CO2 SERPL-SCNC: 28 MMOL/L (ref 23–31)
COLOR UR: YELLOW
CREAT SERPL-MCNC: 1.15 MG/DL (ref 0.73–1.18)
EOSINOPHIL # BLD AUTO: 0.11 X10(3)/MCL (ref 0–0.9)
EOSINOPHIL NFR BLD AUTO: 1.4 %
ERYTHROCYTE [DISTWIDTH] IN BLOOD BY AUTOMATED COUNT: 15.1 % (ref 11.5–17)
FERRITIN SERPL-MCNC: 370.93 NG/ML (ref 21.81–274.66)
GFR SERPLBLD CREATININE-BSD FMLA CKD-EPI: >60 MLS/MIN/1.73/M2
GLOBULIN SER-MCNC: 3.4 GM/DL (ref 2.4–3.5)
GLUCOSE SERPL-MCNC: 161 MG/DL (ref 82–115)
GLUCOSE UR QL STRIP.AUTO: NEGATIVE
HCT VFR BLD AUTO: 35 % (ref 42–52)
HCT VFR BLD AUTO: 35.6 % (ref 42–52)
HGB BLD-MCNC: 11.3 G/DL (ref 14–18)
HGB BLD-MCNC: 11.5 G/DL (ref 14–18)
IMM GRANULOCYTES # BLD AUTO: 0.04 X10(3)/MCL (ref 0–0.04)
IMM GRANULOCYTES NFR BLD AUTO: 0.5 %
INR PPP: 4
IRON SATN MFR SERPL: 9 % (ref 20–50)
IRON SERPL-MCNC: 23 UG/DL (ref 65–175)
KETONES UR QL STRIP.AUTO: NEGATIVE
LEUKOCYTE ESTERASE UR QL STRIP.AUTO: NEGATIVE
LYMPHOCYTES # BLD AUTO: 1.39 X10(3)/MCL (ref 0.6–4.6)
LYMPHOCYTES NFR BLD AUTO: 17.7 %
MCH RBC QN AUTO: 26.7 PG (ref 27–31)
MCHC RBC AUTO-ENTMCNC: 32.3 G/DL (ref 33–36)
MCV RBC AUTO: 82.7 FL (ref 80–94)
MONOCYTES # BLD AUTO: 0.7 X10(3)/MCL (ref 0.1–1.3)
MONOCYTES NFR BLD AUTO: 8.9 %
NEUTROPHILS # BLD AUTO: 5.6 X10(3)/MCL (ref 2.1–9.2)
NEUTROPHILS NFR BLD AUTO: 71.1 %
NITRITE UR QL STRIP.AUTO: NEGATIVE
NRBC BLD AUTO-RTO: 0 %
PH UR STRIP.AUTO: 6.5 [PH]
PLATELET # BLD AUTO: 392 X10(3)/MCL (ref 130–400)
PMV BLD AUTO: 9.2 FL (ref 7.4–10.4)
POTASSIUM SERPL-SCNC: 4.4 MMOL/L (ref 3.5–5.1)
PROT SERPL-MCNC: 6.4 GM/DL (ref 5.8–7.6)
PROT UR QL STRIP.AUTO: NEGATIVE
PROTHROMBIN TIME: 38 SECONDS (ref 12.5–14.5)
RBC # BLD AUTO: 4.23 X10(6)/MCL (ref 4.7–6.1)
RBC #/AREA URNS AUTO: <5 /HPF
RBC UR QL AUTO: NEGATIVE
SODIUM SERPL-SCNC: 139 MMOL/L (ref 136–145)
SP GR UR STRIP.AUTO: 1.01 (ref 1–1.03)
SQUAMOUS #/AREA URNS AUTO: <5 /HPF
TIBC SERPL-MCNC: 243 UG/DL (ref 69–240)
TIBC SERPL-MCNC: 266 UG/DL (ref 250–450)
TRANSFERRIN SERPL-MCNC: 248 MG/DL
TROPONIN I SERPL-MCNC: 0.02 NG/ML (ref 0–0.04)
UROBILINOGEN UR STRIP-ACNC: 1
VIT B12 SERPL-MCNC: 290 PG/ML (ref 213–816)
WBC # SPEC AUTO: 7.87 X10(3)/MCL (ref 4.5–11.5)
WBC #/AREA URNS AUTO: <5 /HPF

## 2023-08-11 PROCEDURE — 84484 ASSAY OF TROPONIN QUANT: CPT | Performed by: NURSE PRACTITIONER

## 2023-08-11 PROCEDURE — 99285 EMERGENCY DEPT VISIT HI MDM: CPT | Mod: 25

## 2023-08-11 PROCEDURE — 82728 ASSAY OF FERRITIN: CPT | Performed by: INTERNAL MEDICINE

## 2023-08-11 PROCEDURE — 81001 URINALYSIS AUTO W/SCOPE: CPT | Performed by: NURSE PRACTITIONER

## 2023-08-11 PROCEDURE — 82607 VITAMIN B-12: CPT | Performed by: INTERNAL MEDICINE

## 2023-08-11 PROCEDURE — 83880 ASSAY OF NATRIURETIC PEPTIDE: CPT | Performed by: STUDENT IN AN ORGANIZED HEALTH CARE EDUCATION/TRAINING PROGRAM

## 2023-08-11 PROCEDURE — 85014 HEMATOCRIT: CPT | Performed by: INTERNAL MEDICINE

## 2023-08-11 PROCEDURE — 83550 IRON BINDING TEST: CPT | Performed by: INTERNAL MEDICINE

## 2023-08-11 PROCEDURE — 85025 COMPLETE CBC W/AUTO DIFF WBC: CPT | Performed by: NURSE PRACTITIONER

## 2023-08-11 PROCEDURE — 11000001 HC ACUTE MED/SURG PRIVATE ROOM

## 2023-08-11 PROCEDURE — 85610 PROTHROMBIN TIME: CPT | Performed by: NURSE PRACTITIONER

## 2023-08-11 PROCEDURE — C9113 INJ PANTOPRAZOLE SODIUM, VIA: HCPCS | Performed by: STUDENT IN AN ORGANIZED HEALTH CARE EDUCATION/TRAINING PROGRAM

## 2023-08-11 PROCEDURE — C9113 INJ PANTOPRAZOLE SODIUM, VIA: HCPCS | Performed by: INTERNAL MEDICINE

## 2023-08-11 PROCEDURE — 63600175 PHARM REV CODE 636 W HCPCS: Performed by: INTERNAL MEDICINE

## 2023-08-11 PROCEDURE — 96374 THER/PROPH/DIAG INJ IV PUSH: CPT

## 2023-08-11 PROCEDURE — 80053 COMPREHEN METABOLIC PANEL: CPT | Performed by: NURSE PRACTITIONER

## 2023-08-11 PROCEDURE — 63600175 PHARM REV CODE 636 W HCPCS: Performed by: STUDENT IN AN ORGANIZED HEALTH CARE EDUCATION/TRAINING PROGRAM

## 2023-08-11 RX ORDER — FUROSEMIDE 10 MG/ML
40 INJECTION INTRAMUSCULAR; INTRAVENOUS
Status: DISCONTINUED | OUTPATIENT
Start: 2023-08-12 | End: 2023-08-18

## 2023-08-11 RX ORDER — ONDANSETRON 2 MG/ML
4 INJECTION INTRAMUSCULAR; INTRAVENOUS EVERY 4 HOURS PRN
Status: DISCONTINUED | OUTPATIENT
Start: 2023-08-11 | End: 2023-08-18 | Stop reason: HOSPADM

## 2023-08-11 RX ORDER — SODIUM CHLORIDE 0.9 % (FLUSH) 0.9 %
10 SYRINGE (ML) INJECTION
Status: DISCONTINUED | OUTPATIENT
Start: 2023-08-11 | End: 2023-08-18 | Stop reason: HOSPADM

## 2023-08-11 RX ORDER — ACETAMINOPHEN 325 MG/1
650 TABLET ORAL EVERY 4 HOURS PRN
Status: DISCONTINUED | OUTPATIENT
Start: 2023-08-11 | End: 2023-08-18 | Stop reason: HOSPADM

## 2023-08-11 RX ORDER — AMOXICILLIN 250 MG
2 CAPSULE ORAL 2 TIMES DAILY PRN
Status: DISCONTINUED | OUTPATIENT
Start: 2023-08-11 | End: 2023-08-18 | Stop reason: HOSPADM

## 2023-08-11 RX ORDER — ACETAMINOPHEN 500 MG
1000 TABLET ORAL EVERY 6 HOURS PRN
Status: DISCONTINUED | OUTPATIENT
Start: 2023-08-11 | End: 2023-08-18 | Stop reason: HOSPADM

## 2023-08-11 RX ORDER — METOPROLOL TARTRATE 1 MG/ML
5 INJECTION, SOLUTION INTRAVENOUS EVERY 6 HOURS PRN
Status: DISCONTINUED | OUTPATIENT
Start: 2023-08-11 | End: 2023-08-18 | Stop reason: HOSPADM

## 2023-08-11 RX ORDER — PANTOPRAZOLE SODIUM 40 MG/10ML
40 INJECTION, POWDER, LYOPHILIZED, FOR SOLUTION INTRAVENOUS EVERY 12 HOURS
Status: DISCONTINUED | OUTPATIENT
Start: 2023-08-11 | End: 2023-08-13

## 2023-08-11 RX ORDER — PROCHLORPERAZINE EDISYLATE 5 MG/ML
5 INJECTION INTRAMUSCULAR; INTRAVENOUS EVERY 6 HOURS PRN
Status: DISCONTINUED | OUTPATIENT
Start: 2023-08-11 | End: 2023-08-18 | Stop reason: HOSPADM

## 2023-08-11 RX ORDER — PANTOPRAZOLE SODIUM 40 MG/10ML
80 INJECTION, POWDER, LYOPHILIZED, FOR SOLUTION INTRAVENOUS
Status: COMPLETED | OUTPATIENT
Start: 2023-08-11 | End: 2023-08-11

## 2023-08-11 RX ORDER — MAG HYDROX/ALUMINUM HYD/SIMETH 200-200-20
30 SUSPENSION, ORAL (FINAL DOSE FORM) ORAL 4 TIMES DAILY PRN
Status: DISCONTINUED | OUTPATIENT
Start: 2023-08-11 | End: 2023-08-18 | Stop reason: HOSPADM

## 2023-08-11 RX ORDER — POLYETHYLENE GLYCOL 3350 17 G/17G
17 POWDER, FOR SOLUTION ORAL 2 TIMES DAILY PRN
Status: DISCONTINUED | OUTPATIENT
Start: 2023-08-11 | End: 2023-08-18 | Stop reason: HOSPADM

## 2023-08-11 RX ORDER — FUROSEMIDE 10 MG/ML
80 INJECTION INTRAMUSCULAR; INTRAVENOUS
Status: COMPLETED | OUTPATIENT
Start: 2023-08-11 | End: 2023-08-11

## 2023-08-11 RX ORDER — TALC
6 POWDER (GRAM) TOPICAL NIGHTLY PRN
Status: DISCONTINUED | OUTPATIENT
Start: 2023-08-11 | End: 2023-08-18 | Stop reason: HOSPADM

## 2023-08-11 RX ORDER — LEVALBUTEROL INHALATION SOLUTION 1.25 MG/3ML
1 SOLUTION RESPIRATORY (INHALATION) EVERY 8 HOURS PRN
Status: DISCONTINUED | OUTPATIENT
Start: 2023-08-11 | End: 2023-08-18 | Stop reason: HOSPADM

## 2023-08-11 RX ADMIN — PANTOPRAZOLE SODIUM 40 MG: 40 INJECTION, POWDER, LYOPHILIZED, FOR SOLUTION INTRAVENOUS at 11:08

## 2023-08-11 RX ADMIN — FUROSEMIDE 80 MG: 10 INJECTION, SOLUTION INTRAMUSCULAR; INTRAVENOUS at 03:08

## 2023-08-11 RX ADMIN — PANTOPRAZOLE SODIUM 80 MG: 40 INJECTION, POWDER, FOR SOLUTION INTRAVENOUS at 05:08

## 2023-08-11 NOTE — FIRST PROVIDER EVALUATION
Medical screening examination initiated.  I have conducted a focused provider triage encounter, findings are as follows:    Brief history of present illness:  Patient states bloody stools x 3 days.     Vitals:    08/11/23 1235 08/11/23 1237   BP:  (!) 142/81   Pulse: 65    Resp: 18    Temp: 97.9 °F (36.6 °C)    SpO2: 99%    Weight: 79.4 kg (175 lb)        Pertinent physical exam:  Awake, alert, ambulatory      Brief workup plan:  Labs    Preliminary workup initiated; this workup will be continued and followed by the physician or advanced practice provider that is assigned to the patient when roomed.

## 2023-08-11 NOTE — Clinical Note
Diagnosis: GI bleed [826091]   Admitting Provider:: GARTH LOVING [[id number]]   Future Attending Provider: GARTH LOVING [[id number]]   Reason for IP Medical Treatment  (Clinical interventions that can only be accomplished in the IP setting? ) :: CHF exacerbation, GI bleed, new xarelto started   I certify that Inpatient services for greater than or equal to 2 midnights are medically necessary:: Yes   Plans for Post-Acute care--if anticipated (pick the single best option):: A. No post acute care anticipated at this time

## 2023-08-11 NOTE — ED PROVIDER NOTES
Encounter Date: 8/11/2023    SCRIBE #1 NOTE: I, Dinh Mijares, am scribing for, and in the presence of,  Dion Huff MD. I have scribed the following portions of the note - the EKG reading. Other sections scribed: HPI, ROS, PE.     History     Chief Complaint   Patient presents with    Melena     Black stools x 3 days, pr reports 2 bm a day. + blood thinners. + weakness, + dizziness. + sob. Denies n/v, cp (hx of afib). GCS 15.      89 y/o male with a history of A fib on Xarelto and Metoprolol, CHF, and hypercholesterolemia presents to the ED with dark stools and rectal bleeding. Relative at the bedside reports that pt is experiencing SOB and cough. He visited [facility] ER yesterday and was told he still had fluid on his lungs. Pt was recently hospitalized for treatment of A fib started on Xarelto. Pt denies hematemesis.  No diagnosis of formal COPD but extensive smoking history.    The history is provided by the patient. No  was used.   Rectal Bleeding   The current episode started just prior to arrival. The onset was gradual. The problem has been unchanged. Associated symptoms include coughing. Pertinent negatives include no fever, no abdominal pain, no chest pain and no rash.     Review of patient's allergies indicates:   Allergen Reactions    Colestipol     Meloxicam     Rosuvastatin     Simvastatin     Statins-hmg-coa reductase inhibitors     Tramadol      Past Medical History:   Diagnosis Date    A-fib     CHF (congestive heart failure)     Chronic pain     Hypercholesterolemia      Past Surgical History:   Procedure Laterality Date    FRACTURE SURGERY      TIBIA FRACTURE SURGERY Right      Family History   Problem Relation Age of Onset    Hypertension Mother     Heart disease Mother     Hyperlipidemia Mother     Cancer Mother     COPD Mother     Dementia Father      Social History     Tobacco Use    Smoking status: Never    Smokeless tobacco: Never   Substance Use Topics    Alcohol use: Yes      Comment: Has cut down from 6 to a couple of beer now    Drug use: Never     Review of Systems   Constitutional:  Negative for fever.   HENT:  Negative for sore throat.    Eyes:  Negative for visual disturbance.   Respiratory:  Positive for cough and shortness of breath.    Cardiovascular:  Negative for chest pain.   Gastrointestinal:  Positive for anal bleeding and hematochezia. Negative for abdominal pain.        Dark stools   Genitourinary:  Negative for dysuria.   Musculoskeletal:  Negative for joint swelling.   Skin:  Negative for rash.   Neurological:  Negative for weakness.   Psychiatric/Behavioral:  Negative for confusion.    All other systems reviewed and are negative.      Physical Exam     Initial Vitals   BP Pulse Resp Temp SpO2   08/11/23 1237 08/11/23 1235 08/11/23 1235 08/11/23 1235 08/11/23 1235   (!) 142/81 65 18 97.9 °F (36.6 °C) 99 %      MAP       --                Physical Exam    Nursing note and vitals reviewed.  Constitutional: He appears well-developed and well-nourished. He is not diaphoretic. No distress.   HENT:   Head: Normocephalic and atraumatic.   Eyes: Conjunctivae and EOM are normal. Pupils are equal, round, and reactive to light.   Neck:   Normal range of motion.  Cardiovascular:  Normal heart sounds and intact distal pulses.           No murmur heard.  Irregular irregular rhythm   Pulmonary/Chest: No respiratory distress. He has wheezes. He has no rales.   Crackles at bases   Abdominal: Abdomen is soft. He exhibits no distension. There is no abdominal tenderness.   Genitourinary:    Genitourinary Comments: Stool in sample provided by the patient is melanotic.  Refused rectal exam.      Musculoskeletal:         General: No tenderness or edema. Normal range of motion.      Cervical back: Normal range of motion.     Neurological: He is alert and oriented to person, place, and time. No cranial nerve deficit.   Skin: Skin is warm and dry. Capillary refill takes less than 2 seconds.  No rash noted. No erythema.   Psychiatric:   Frequently swearing and cursing at staff.         ED Course   Procedures  Labs Reviewed   COMPREHENSIVE METABOLIC PANEL - Abnormal; Notable for the following components:       Result Value    Glucose Level 161 (*)     Calcium Level Total 8.6 (*)     Albumin Level 3.0 (*)     Albumin/Globulin Ratio 0.9 (*)     All other components within normal limits   CBC WITH DIFFERENTIAL - Abnormal; Notable for the following components:    RBC 4.23 (*)     Hgb 11.3 (*)     Hct 35.0 (*)     MCH 26.7 (*)     MCHC 32.3 (*)     All other components within normal limits   PROTIME-INR - Abnormal; Notable for the following components:    PT 38.0 (*)     INR 4.0 (*)     All other components within normal limits   B-TYPE NATRIURETIC PEPTIDE - Abnormal; Notable for the following components:    Natriuretic Peptide 366.6 (*)     All other components within normal limits   TROPONIN I - Normal   URINALYSIS, REFLEX TO URINE CULTURE - Normal   URINALYSIS, MICROSCOPIC - Normal   CBC W/ AUTO DIFFERENTIAL    Narrative:     The following orders were created for panel order CBC auto differential.  Procedure                               Abnormality         Status                     ---------                               -----------         ------                     CBC with Differential[[id number]]        Abnormal            Final result                 Please view results for these tests on the individual orders.     EKG Readings: (Independently Interpreted)   Atrial fibrillation.  Rate of 88.  Left axis deviation.  Right bundle-branch block.  No STEMI.       Imaging Results              X-Ray Chest AP Portable (Final result)  Result time 08/11/23 15:17:42      Final result by Linwood Loera MD (08/11/23 15:17:42)                   Impression:      No adverse interval change.  Left-sided pleural effusion remains.      Electronically signed by: Linwood Loera  Date:    08/11/2023  Time:    15:17                Narrative:    EXAMINATION:  XR CHEST AP PORTABLE    CLINICAL HISTORY:  Shortness of breath    TECHNIQUE:  Single view of the chest    COMPARISON:  08/10/2023    FINDINGS:  No focal opacification.  Left-sided pleural effusion similar to prior.  Cardiomegaly is unchanged.  No pneumothorax.                                    X-Rays:   Independently Interpreted Readings:   Chest X-Ray: Increased vascular markings consistent with CHF are present. Left-sided pleural effusion     Medications   pantoprazole injection 40 mg (40 mg Intravenous Not Given 8/12/23 0900)   sodium chloride 0.9% flush 10 mL (has no administration in time range)   melatonin tablet 6 mg (has no administration in time range)   ondansetron injection 4 mg (has no administration in time range)   prochlorperazine injection Soln 5 mg (has no administration in time range)   polyethylene glycol packet 17 g (has no administration in time range)   senna-docusate 8.6-50 mg per tablet 2 tablet (has no administration in time range)   acetaminophen tablet 650 mg (has no administration in time range)   aluminum-magnesium hydroxide-simethicone 200-200-20 mg/5 mL suspension 30 mL (has no administration in time range)   acetaminophen tablet 1,000 mg (has no administration in time range)   levalbuterol nebulizer solution 1.25 mg (has no administration in time range)   metoprolol succinate 24 hr tablet 75 mg (75 mg Oral Given 8/12/23 0915)   furosemide injection 40 mg (40 mg Intravenous Given 8/12/23 0640)   metoprolol injection 5 mg (has no administration in time range)   guaiFENesin 100 mg/5 ml syrup 200 mg (has no administration in time range)   fluticasone furoate-vilanteroL 100-25 mcg/dose diskus inhaler 1 puff (1 puff Inhalation Given 8/12/23 0918)   guaiFENesin 12 hr tablet 600 mg (has no administration in time range)   ferric gluconate (FERRLECIT) 125 mg in sodium chloride 0.9% 100 mL IVPB (has no administration in time range)   furosemide injection  80 mg (80 mg Intravenous Given 8/11/23 1540)   pantoprazole injection 80 mg (80 mg Intravenous Given 8/11/23 1394)   iopamidoL (ISOVUE-370) injection 100 mL (100 mLs Intravenous Given 8/12/23 6456)     Medical Decision Making:   History:   I obtained history from: someone other than patient and EMS provider.       <> Summary of History: Relative at the bedside reports that pt is experiencing SOB and cough. He visited [facility] ER yesterday and was told he still had fluid on his lungs. Pt was recently hospitalized for treatment of A fib and discharged 8/6/23.  Old Medical Records: I decided to obtain old medical records.  Old Records Summarized: records from clinic visits, records from previous admission(s) and records from another hospital.       <> Summary of Records: Reviewed recent records including discharge summary, recent diagnosis of AFib and started on anticoagulation.  Initial Assessment:   AFib, rectal bleeding, shortness of breath  Differential Diagnosis:   Judging by the patient's chief complaint and pertinent history, the patient has the following possible differential diagnoses, including but not limited to the following.  Some of these are deemed to be lower likelihood and some more likely based on my physical exam and history combined with possible lab work and/or imaging studies.   Please see the pertinent studies, and refer to the HPI.  Some of these diagnoses will take further evaluation to fully rule out, perhaps as an outpatient and the patient was encouraged to follow up when discharged for more comprehensive evaluation.    Upper GI bleed: PUD, gastritis, varices, AVM, tumors, erosions, AE fistula  Lower GI bleed: diverticular bleed, colon cancer, AVM, hemorrhoid, AE fistula     Independently Interpreted Test(s):   I have ordered and independently interpreted X-rays - see prior notes.  I have ordered and independently interpreted EKG Reading(s) - see prior notes  Clinical Tests:   Lab Tests:  Ordered and Reviewed  Radiological Study: Ordered and Reviewed  Medical Tests: Ordered and Reviewed  ED Management:    Patient is a 88-year-old who presents to the emergency department complaining of rectal bleeding.  He was recently diagnosed with AFib and started on anticoagulation.  He was seen yesterday for shortness of breath, did not mention any rectal bleeding.  He provided a stool sample at the bedside which was melanotic.  Guaiac positive.  Likely due to recent anticoagulation initiation.  Blood counts as noted.  He also complains of shortness of breath.  He is extensive smoking history, but no formal diagnosis of COPD.  He was given a DuoNeb.  Chest x-ray with pleural effusion, likely congestive heart failure, given dose of Lasix with improvement.  I discussed that the etiology of his shortness of breath is likely due to congestive heart failure and his extensive smoking history resulting in likely COPD.  Discussed that the rectal bleeding is likely due to anticoagulation use.  Shared decision making used to determine disposition.  He is hemodynamically stable.  Will place in observation for repeat hemoglobin.  Will hold anticoagulation.  All results discussed answered all questions at this time.  Discussed placing patient on NPO/clear liquids.  Patient states he will eat when he wants to.  Discussed may limit any endoscopic evaluation.  Patient family verbalized understanding.  Hemodynamically stable at this time.  Other:   I have discussed this case with another health care provider.       <> Summary of the Discussion: Discussed with hospital medicine for admission.          Scribe Attestation:   Scribe #1: I performed the above scribed service and the documentation accurately describes the services I performed. I attest to the accuracy of the note.    Attending Attestation:           Physician Attestation for Scribe:  Physician Attestation Statement for Scribe #1: I, Dion Huff MD, reviewed  documentation, as scribed by Dinh Mijares in my presence, and it is both accurate and complete.       Medical Decision Making  Amount and/or Complexity of Data Reviewed  Independent Historian: friend     Details: Relative at the bedside reports that pt is experiencing SOB and cough. He visited [facility] ER yesterday and was told he still had fluid on his lungs. Pt was recently hospitalized for treatment of A fib and discharged 8/6/23.  Labs: ordered.  Radiology: ordered.    Risk  Prescription drug management.  Decision regarding hospitalization.           ED Course as of 08/12/23 1114   Fri Aug 11, 2023   1506   Left Ventricle: The left ventricle is mildly dilated. Normal wall thickness. Normal wall motion. There is mildly reduced systolic function with a visually estimated ejection fraction of 45 - 50%. Unable to assess diastolic function due to arrhythmia. Elevated left ventricular filling pressure.  ·  Left Atrium: Left atrium is severely dilated.  ·  Right Ventricle: Mild right ventricular enlargement. Systolic function is normal.  ·  Right Atrium: Right atrium is severely dilated.  ·  Aortic Valve: The aortic valve is a trileaflet valve. Mildly restricted motion.  ·  Mitral Valve: Moderately thickened leaflets. There is mild regurgitation.  ·  Pulmonic Valve: There is mild regurgitation.  ·  Pericardium: Small circumferential pericardial effusion present. No indication of cardiac tamponade.   [RP]   [address] [place] [address] medicine paged [JG]      ED Course User Index  [JG] Dinh Mijares  [RP] Dion Huff MD                 Clinical Impression:   Final diagnoses:  [R06.02] SOB (shortness of breath)  [K92.2] GI bleed  [K92.1] Melena (Primary)  [I50.9] Congestive heart failure, unspecified HF chronicity, unspecified heart failure type  [R79.1] Elevated INR        ED Disposition Condition    Admit Stable                Dion Huff MD  08/13/23 [id number]

## 2023-08-12 LAB
ALBUMIN SERPL-MCNC: 2.8 G/DL (ref 3.4–4.8)
ALBUMIN/GLOB SERPL: 0.9 RATIO (ref 1.1–2)
ALP SERPL-CCNC: 54 UNIT/L (ref 40–150)
ALT SERPL-CCNC: 26 UNIT/L (ref 0–55)
AST SERPL-CCNC: 19 UNIT/L (ref 5–34)
AV INDEX (PROSTH): 0.82
AV MEAN GRADIENT: 3 MMHG
AV PEAK GRADIENT: 6 MMHG
AV VELOCITY RATIO: 0.7
BASOPHILS # BLD AUTO: 0.03 X10(3)/MCL
BASOPHILS NFR BLD AUTO: 0.4 %
BILIRUB SERPL-MCNC: 0.6 MG/DL
BSA FOR ECHO PROCEDURE: 1.98 M2
BUN SERPL-MCNC: 14.1 MG/DL (ref 8.4–25.7)
CALCIUM SERPL-MCNC: 8.3 MG/DL (ref 8.8–10)
CHLORIDE SERPL-SCNC: 99 MMOL/L (ref 98–107)
CO2 SERPL-SCNC: 28 MMOL/L (ref 23–31)
CREAT SERPL-MCNC: 1.03 MG/DL (ref 0.73–1.18)
CV ECHO LV RWT: 0.57 CM
DOP CALC AO PEAK VEL: 1.21 M/S
DOP CALC AO VTI: 19.8 CM
DOP CALC LVOT PEAK VEL: 0.85 M/S
DOP CALCLVOT PEAK VEL VTI: 16.3 CM
E WAVE DECELERATION TIME: 201 MSEC
E/A RATIO: 2.5
ECHO LV POSTERIOR WALL: 1.32 CM (ref 0.6–1.1)
EJECTION FRACTION: 35 %
EOSINOPHIL # BLD AUTO: 0.18 X10(3)/MCL (ref 0–0.9)
EOSINOPHIL NFR BLD AUTO: 2.4 %
ERYTHROCYTE [DISTWIDTH] IN BLOOD BY AUTOMATED COUNT: 15.1 % (ref 11.5–17)
EST. AVERAGE GLUCOSE BLD GHB EST-MCNC: 142.7 MG/DL
FOLATE SERPL-MCNC: 19.1 NG/ML (ref 7–31.4)
FRACTIONAL SHORTENING: 17 % (ref 28–44)
GFR SERPLBLD CREATININE-BSD FMLA CKD-EPI: >60 MLS/MIN/1.73/M2
GLOBULIN SER-MCNC: 3.1 GM/DL (ref 2.4–3.5)
GLUCOSE SERPL-MCNC: 105 MG/DL (ref 82–115)
HBA1C MFR BLD: 6.6 %
HCT VFR BLD AUTO: 33 % (ref 42–52)
HGB BLD-MCNC: 10.7 G/DL (ref 14–18)
IMM GRANULOCYTES # BLD AUTO: 0.03 X10(3)/MCL (ref 0–0.04)
IMM GRANULOCYTES NFR BLD AUTO: 0.4 %
INR PPP: 1.8
INTERVENTRICULAR SEPTUM: 1.18 CM (ref 0.6–1.1)
LEFT ATRIUM VOLUME INDEX MOD: 67.5 ML/M2
LEFT ATRIUM VOLUME MOD: 133 CM3
LEFT INTERNAL DIMENSION IN SYSTOLE: 3.83 CM (ref 2.1–4)
LEFT VENTRICLE DIASTOLIC VOLUME INDEX: 49.39 ML/M2
LEFT VENTRICLE DIASTOLIC VOLUME: 97.3 ML
LEFT VENTRICLE MASS INDEX: 110 G/M2
LEFT VENTRICLE SYSTOLIC VOLUME INDEX: 32 ML/M2
LEFT VENTRICLE SYSTOLIC VOLUME: 63.1 ML
LEFT VENTRICULAR INTERNAL DIMENSION IN DIASTOLE: 4.6 CM (ref 3.5–6)
LEFT VENTRICULAR MASS: 217.4 G
LVOT MG: 1 MMHG
LVOT MV: 0.52 CM/S
LYMPHOCYTES # BLD AUTO: 1.54 X10(3)/MCL (ref 0.6–4.6)
LYMPHOCYTES NFR BLD AUTO: 20.3 %
MAGNESIUM SERPL-MCNC: 2.1 MG/DL (ref 1.6–2.6)
MCH RBC QN AUTO: 27.3 PG (ref 27–31)
MCHC RBC AUTO-ENTMCNC: 32.4 G/DL (ref 33–36)
MCV RBC AUTO: 84.2 FL (ref 80–94)
MONOCYTES # BLD AUTO: 0.8 X10(3)/MCL (ref 0.1–1.3)
MONOCYTES NFR BLD AUTO: 10.6 %
MV PEAK A VEL: 0.28 M/S
MV PEAK E VEL: 0.7 M/S
NEUTROPHILS # BLD AUTO: 4.99 X10(3)/MCL (ref 2.1–9.2)
NEUTROPHILS NFR BLD AUTO: 65.9 %
NRBC BLD AUTO-RTO: 0 %
PISA TR MAX VEL: 2.34 M/S
PLATELET # BLD AUTO: 364 X10(3)/MCL (ref 130–400)
PMV BLD AUTO: 8.8 FL (ref 7.4–10.4)
POTASSIUM SERPL-SCNC: 3.9 MMOL/L (ref 3.5–5.1)
PROT SERPL-MCNC: 5.9 GM/DL (ref 5.8–7.6)
PROTHROMBIN TIME: 20.7 SECONDS (ref 12.5–14.5)
RBC # BLD AUTO: 3.92 X10(6)/MCL (ref 4.7–6.1)
SODIUM SERPL-SCNC: 135 MMOL/L (ref 136–145)
TR MAX PG: 22 MMHG
TRICUSPID ANNULAR PLANE SYSTOLIC EXCURSION: 1.07 CM
WBC # SPEC AUTO: 7.57 X10(3)/MCL (ref 4.5–11.5)
Z-SCORE OF LEFT VENTRICULAR DIMENSION IN END DIASTOLE: -2.08
Z-SCORE OF LEFT VENTRICULAR DIMENSION IN END SYSTOLE: 0.76

## 2023-08-12 PROCEDURE — 85025 COMPLETE CBC W/AUTO DIFF WBC: CPT | Performed by: NURSE PRACTITIONER

## 2023-08-12 PROCEDURE — 85610 PROTHROMBIN TIME: CPT | Performed by: INTERNAL MEDICINE

## 2023-08-12 PROCEDURE — C9113 INJ PANTOPRAZOLE SODIUM, VIA: HCPCS | Performed by: INTERNAL MEDICINE

## 2023-08-12 PROCEDURE — 63600175 PHARM REV CODE 636 W HCPCS: Performed by: INTERNAL MEDICINE

## 2023-08-12 PROCEDURE — 25000003 PHARM REV CODE 250: Performed by: NURSE PRACTITIONER

## 2023-08-12 PROCEDURE — 21400001 HC TELEMETRY ROOM

## 2023-08-12 PROCEDURE — 83735 ASSAY OF MAGNESIUM: CPT | Performed by: INTERNAL MEDICINE

## 2023-08-12 PROCEDURE — 25000242 PHARM REV CODE 250 ALT 637 W/ HCPCS: Performed by: NURSE PRACTITIONER

## 2023-08-12 PROCEDURE — 25000003 PHARM REV CODE 250: Performed by: INTERNAL MEDICINE

## 2023-08-12 PROCEDURE — 83036 HEMOGLOBIN GLYCOSYLATED A1C: CPT | Performed by: NURSE PRACTITIONER

## 2023-08-12 PROCEDURE — 25500020 PHARM REV CODE 255: Performed by: INTERNAL MEDICINE

## 2023-08-12 PROCEDURE — 80053 COMPREHEN METABOLIC PANEL: CPT | Performed by: NURSE PRACTITIONER

## 2023-08-12 PROCEDURE — 82746 ASSAY OF FOLIC ACID SERUM: CPT | Performed by: INTERNAL MEDICINE

## 2023-08-12 RX ORDER — SUCRALFATE 1 G/1
1 TABLET ORAL
Status: DISCONTINUED | OUTPATIENT
Start: 2023-08-12 | End: 2023-08-18 | Stop reason: HOSPADM

## 2023-08-12 RX ORDER — GUAIFENESIN 100 MG/5ML
200 SOLUTION ORAL EVERY 4 HOURS PRN
Status: DISCONTINUED | OUTPATIENT
Start: 2023-08-12 | End: 2023-08-18 | Stop reason: HOSPADM

## 2023-08-12 RX ORDER — PREDNISONE 20 MG/1
20 TABLET ORAL DAILY
Status: DISPENSED | OUTPATIENT
Start: 2023-08-13 | End: 2023-08-18

## 2023-08-12 RX ORDER — GUAIFENESIN 600 MG/1
600 TABLET, EXTENDED RELEASE ORAL 2 TIMES DAILY
Status: DISCONTINUED | OUTPATIENT
Start: 2023-08-12 | End: 2023-08-18 | Stop reason: HOSPADM

## 2023-08-12 RX ORDER — FLUTICASONE FUROATE AND VILANTEROL 100; 25 UG/1; UG/1
1 POWDER RESPIRATORY (INHALATION) DAILY
Status: DISCONTINUED | OUTPATIENT
Start: 2023-08-12 | End: 2023-08-18 | Stop reason: HOSPADM

## 2023-08-12 RX ADMIN — SUCRALFATE 1 G: 1 TABLET ORAL at 05:08

## 2023-08-12 RX ADMIN — FUROSEMIDE 40 MG: 10 INJECTION, SOLUTION INTRAMUSCULAR; INTRAVENOUS at 05:08

## 2023-08-12 RX ADMIN — FLUTICASONE FUROATE AND VILANTEROL TRIFENATATE 1 PUFF: 100; 25 POWDER RESPIRATORY (INHALATION) at 09:08

## 2023-08-12 RX ADMIN — GUAIFENESIN 600 MG: 600 TABLET, EXTENDED RELEASE ORAL at 12:08

## 2023-08-12 RX ADMIN — ACETAMINOPHEN 1000 MG: 500 TABLET, FILM COATED ORAL at 12:08

## 2023-08-12 RX ADMIN — GUAIFENESIN 600 MG: 600 TABLET, EXTENDED RELEASE ORAL at 08:08

## 2023-08-12 RX ADMIN — SUCRALFATE 1 G: 1 TABLET ORAL at 08:08

## 2023-08-12 RX ADMIN — SODIUM CHLORIDE 125 MG: 9 INJECTION, SOLUTION INTRAVENOUS at 12:08

## 2023-08-12 RX ADMIN — METOPROLOL SUCCINATE 75 MG: 50 TABLET, EXTENDED RELEASE ORAL at 09:08

## 2023-08-12 RX ADMIN — FUROSEMIDE 40 MG: 10 INJECTION, SOLUTION INTRAMUSCULAR; INTRAVENOUS at 06:08

## 2023-08-12 RX ADMIN — IOPAMIDOL 100 ML: 755 INJECTION, SOLUTION INTRAVENOUS at 03:08

## 2023-08-12 RX ADMIN — PANTOPRAZOLE SODIUM 40 MG: 40 INJECTION, POWDER, LYOPHILIZED, FOR SOLUTION INTRAVENOUS at 08:08

## 2023-08-12 NOTE — H&P
[facility] Hospital Medicine - H&P Note    Patient Name: Lizandro Martinez  : 1935  MRN: [id number]  [facility]  Admitting Physician: Edmond Auguste MD  Admission Class: IP- Inpatient   Code status: Full    Chief Complaint   Melena (Black stools x 3 days, pr reports 2 bm a day. + blood thinners. + weakness, + dizziness. + sob. Denies n/v, cp (hx of afib). GCS 15. )      History of Present Illness    Mr. Martinez is an 88-year-old male with history of HTN, HLD, ETOH use, chronic pain,  former tobacco use and recent admission to our service from 23-23 for new onset atrial fibrillation and newly diagnosed decompensated systolic heart failure.  He was diuresed and placed on Xarelto, metoprolol and Lasix and discharged home with outpatient follow-up with Cardiology.  He presents  back to the ED with c/o dark stool x3 days, worsening SOB. He was seen at [facility] ED yesterday for SOB, and a worsening non-productive cough that has been present since Dec 2022. CXR showed vascular congestion and LLL opacity. He was given a one time dose of Lasix and gave him script for Azithromycin for PNA.. He has never had an EGD, las c-scope a few years ago and was reportedly negative.     Upon arrival to the ED patient was hemodynamically stable and afebrile.  Laboratory work was remarkable for Hgb 11 (hemoglobin 6 months ago 13), iron 23, ferritin 370, iron saturation 9, INR 4 (INR 1.4 on recent admission), platelets WNL.  Patient was given IV Protonix and lasix and is being admitted to the hospitalist service for further management.    Pt seen and examined by myself. He reports SOB has improved since admission and is urinating a lot. He reports non-productive cough since Dec 2022, denies fever, chills or hemoptysis. He endorses a 40 lb weight loss over past year (but hes actually up 5 lbs from his weight in  per records). He is a former heavy tobacco user. Smokes 4 ppd for 40 yrs and quit  30 yrs ago. No hx of COPD but uses a friends rescue inhaler when he needs it. He also drinks a 6 pack beer daily, but hasn't drank in over a week. He really wants to get his cough looked at, states [facility] but [facility].         ROS   Except as documented, all other systems reviewed and negative     Past Medical History   Atrial fibrillation, on Xarelto  Systolic heart failure (LVEF 45-50% on 08/04/2023)  Essential hypertension  Hyperlipidemia/statin allergy  History of medical noncompliance  Former heavy tobacco use      Echo on 08/04/2023 showed LVEF 45-50%, reduced systolic function, left atrium is severely dilated, mild mitral regurgitation, small circumferential pericardial effusion without indication of tamponade.    Past Surgical History     Past Surgical History:   Procedure Laterality Date    FRACTURE SURGERY      TIBIA FRACTURE SURGERY Right        Social History     Current alcohol use approximately 6 pack of beer a day but states he cut back since his discharge, denies tobacco or illicit drug use  Former tobacco use, smoked 4 ppd x 40 yrs, quit 30 yrs ago.     Family History   Mother with hypertension heart disease hyperlipidemia cancer  Father dementia  Allergies   Colestipol, Meloxicam, Rosuvastatin, Simvastatin, Statins-hmg-coa reductase inhibitors, and Tramadol    Home Medications     Prior to Admission medications    Medication Sig Start Date End Date Taking? Authorizing Provider   azithromycin (Z-JOSSY) 250 MG tablet Take 2 tablets by mouth on day 1; Take 1 tablet by mouth on days 2-5 8/10/23 8/15/23  Bailey Ramesh, NP   furosemide (LASIX) 20 MG tablet Take 2 tablets (40 mg total) by mouth once daily. 8/8/23 8/7/24  Giovanny Salgado MD   furosemide (LASIX) 40 MG tablet Take 1 tablet (40 mg total) by mouth once daily.  Patient not taking: Reported on 8/8/2023 8/6/23 9/5/23  Giovanny Salgado MD   hawthorn 500 mg Cap Take 1 capsule by mouth.    Provider, Historical    levalbuterol (XOPENEX) 0.63 mg/3 mL nebulizer solution Take 3 mLs (0.63 mg total) by nebulization every 8 (eight) hours. Rescue 8/6/23 9/5/23  Giovanny Salgado MD   levalbuterol (XOPENEX) 1.25 mg/3 mL nebulizer solution Take 3 mLs (1.25 mg total) by nebulization every 8 (eight) hours as needed for Wheezing. Rescue  Patient not taking: Reported on 8/8/2023 8/8/23 8/7/24  Giovanny Salgado MD   metoprolol succinate (TOPROL-XL) 25 MG 24 hr tablet Take 1 tablet (25 mg total) by mouth once daily. 8/3/23 8/2/24  Froylan Schwartz MD   metoprolol succinate (TOPROL-XL) 25 MG 24 hr tablet Take 3 tablets (75 mg total) by mouth once daily. 8/6/23 11/4/23  Giovanny Salgado MD   metoprolol succinate (TOPROL-XL) 25 MG 24 hr tablet Take 3 tablets (75 mg total) by mouth once daily. 8/8/23 8/7/24  Giovanny Salgado MD   rivaroxaban (XARELTO) 20 mg Tab Take 1 tablet (20 mg total) by mouth daily with dinner or evening meal. 8/6/23 9/5/23  Giovanny Salgado MD   rivaroxaban (XARELTO) 20 mg Tab Take 1 tablet (20 mg total) by mouth daily with dinner or evening meal. 8/8/23 9/7/23  Giovanny Salgado MD        Physical Exam   Vital Signs  Temp:  [97.9 °F (36.6 °C)]   Pulse:  [65-81]   Resp:  [18]   BP: (123-149)/(74-81)   SpO2:  [95 %-99 %]    General: Appears comfortable, appears stated age in no acute distress  HEENT: NC/AT  Neck:  No JVD  Chest: diminished to LLL but otherwise CTA  CVS: irregular rhythm. Normal S1/S2. 1+ edema bilaterally  Abdomen: nondistended, normoactive BS, soft and non-tender.  MSK: No obvious deformity or joint swelling  Skin: Warm and dry  Neuro: AAOx3, no focal neurological deficit  Psych: Cooperative    Labs     Recent Labs     08/10/23  1356 08/11/23  1259   WBC 8.49 7.87   RBC 4.22* 4.23*   HGB 11.7* 11.3*   HCT 35.8* 35.0*   MCV 84.8 82.7   MCH 27.7 26.7*   MCHC 32.7* 32.3*   RDW 15.0 15.1    392     Recent Labs     08/10/23  1356 08/11/23  1259 08/11/23  1333   PROTIME 35.6*  --  38.0*   INR 3.6*  --  4.0*   PTT  "52.1*  --   --    FERRITIN  --  370.93*  --    IRON  --  23*  --    TRANS  --  248  --    TIBC  --  266  --    LABIRON  --  9*  --    [id number]  --  290  --       Recent Labs     08/10/23  1356 08/11/23  1259    139   K 4.3 4.4   CHLORIDE 101 102   CO2 31 28   BUN 16.8 16.6   CREATININE 1.16 1.15   EGFRNORACEVR >60 >60   GLUCOSE 102 161*   CALCIUM 8.8 8.6*   ALBUMIN 3.0* 3.0*   GLOBULIN 4.0* 3.4   ALKPHOS 61 58   ALT 30 27   AST 22 20   BILITOT 0.5 0.6   .2* 366.6*     No results for input(s): "LACTIC" in the last 72 hours.  Recent Labs     08/10/23  1356 08/11/23  1259   TROPONINI <0.010 0.017        Microbiology Results (last 7 days)       ** No results found for the last 168 hours. **           Imaging     X-Ray Chest AP Portable   Final Result      No adverse interval change.  Left-sided pleural effusion remains.         Electronically signed by: Linwood Loera   Date:    08/11/2023   Time:    15:17        Assessment & Plan   Melena, concerning for upper GI bleed in setting of recent OAC intiation  Acute on chronic normocytic anemia  Coagulopathy  Decompensated systolic HF (LVEF 45-50%)  Chronic Cough with concerns for possible CAP- LLL  Afib, CVR on Xarelto  Essential HTN  ETOH Use    PLAN:   - NPO. GI consult pending.   - No need for blood transfusion at this time, Serial H&H  - Protonix 80mg x1 then 40mg IV BID. HOLD home Xarelto  - Lasix 40 mg BID. Strict I&Os, daily weight.   - Obtain CT Chest w/wo given chronic cough, tobacco use, weight loss  - Resp PCR. Continue home Azithromycin. Bronchodilators PRN. Supplemental 02 PRN.  - Outpatient f/u with Pulmonology for PFTs/  - Home meds reviewed  and resumed  - CBC, CMP, Mag, Phos, PT, PTT in AM  - VTE Prophylaxis: Eliquis on hold.      I, Aurora Fernández, MARSHAP-C have discussed this patients case with Dr. Auguste who agrees with the diagnosis and treatment plan.    "

## 2023-08-12 NOTE — PROGRESS NOTES
[facility]  Hospital Medicine Progress Note        Chief Complaint: Inpatient Follow-up    HPI:   88-year-old male with significant history of PAF on Xarelto, systolic heart failure with recent echocardiogram showing ejection fraction-45-50%, HTN, HLD, medical noncompliance, heavy tobacco use .  patient was recently started on Xarelto for thromboembolic prophylaxis for AFib.  He had a recent hospitalization from 08/04 to 8/6 for acute decompensated heart failure with AFib and was discharged on diuretics, Xarelto and metoprolol.  Patient presented back to the ED with complaints of worsening shortness of breath, nonproductive cough and also melanotic stools.  cough is chronic for the past several months, but lately has been worsening.  Patient initially presented to outlying facility ED and was prescribed azithromycin, Lasix and was discharged home.  but presented back to the ED given persistent complaints.  Patient was hemodynamically stable.  Lab significant for anemia with hemoglobin-11, INR slightly elevated.  Patient initiated on IV Lasix, IV Protonix and was admitted to hospitalist medicine service.  Patient was previously heavy smoker, quit 40 years back.  GI services consulted for GI bleed.  Xarelto held.  CT chest in the ED showed bilateral pleural effusion, cardiomegaly and small pericardial effusion, possible interstitial lung disease.  Did have expiratory wheezes.  Initiated bronchodilators.     Interval Hx:   Patient seen at bedside.  Daughter is at bedside.  Daughter is very anxious given his recurrent hospitalization and also progressive worsening of symptoms.  Answered all her questions appropriately.  Patient is hemodynamically stable.  Respiratory status stable with saturation in mid 90s.    Objective/physical exam:  General: In no acute distress, afebrile  Chest: Clear to auscultation bilaterally  Heart: S1, S2, no appreciable murmur  Abdomen: Soft, nontender, BS  +  MSK: Warm, no lower extremity edema, no clubbing or cyanosis  Neurologic: Alert and oriented x4, moving all extremities with good strength     VITAL SIGNS: 24 HRS MIN & MAX LAST   Temp  Min: 97.7 °F (36.5 °C)  Max: 98.2 °F (36.8 °C) 98.2 °F (36.8 °C)   BP  Min: 109/65  Max: 150/93 (!) 150/93   Pulse  Min: 65  Max: 82  79   Resp  Min: 18  Max: 18 18   SpO2  Min: 93 %  Max: 99 % (!) 94 %       Recent Labs   Lab 08/12/23  0420   WBC 7.57   RBC 3.92*   HGB 10.7*   HCT 33.0*   MCV 84.2   MCH 27.3   MCHC 32.4*   RDW 15.1      MPV 8.8         Recent Labs   Lab 08/12/23  0420   *   K 3.9   CO2 28   BUN 14.1   CREATININE 1.03   CALCIUM 8.3*   MG 2.10   ALBUMIN 2.8*   ALKPHOS 54   ALT 26   AST 19   BILITOT 0.6          Microbiology Results (last 7 days)       ** No results found for the last 168 hours. **             Scheduled Med:   ferric gluconate (FERRLECIT) 125 mg in sodium chloride 0.9% 100 mL IVPB  125 mg Intravenous Once    fluticasone furoate-vilanteroL  1 puff Inhalation Daily    furosemide (LASIX) injection  40 mg Intravenous Q12H    guaiFENesin  600 mg Oral BID    metoprolol succinate  75 mg Oral Daily    pantoprazole  40 mg Intravenous Q12H          Assessment/Plan:    Acute decompensated systolic heart failure with ejection fraction-35%   Bilateral pleural effusion, left more than right secondary to above   Chronic interstitial lung disease with mild acute exacerbation  Suspected acute GI bleed-unclear etiology   Mild acute blood loss anemia   Recent diagnosis AFib on Xarelto   History of heavy tobacco use  Essential HTN-stable   HLD  Medical noncompliance  Prophylaxis    Patient had recent echocardiogram which revealed ejection fraction-45%  CT chest 8/11 with concerns for pericardial effusion and therefore an echocardiogram was repeated   Drop in ejection fraction to 35%  Consult Cardiology  IV Lasix 40 mg b.i.d.  Strict Is&Os  Continue beta-blocker   No antiplatelets for now given suspected GI  bleed, patient is allergic to statin  Further medication optimization per Cardiology  GI consulted to further evaluate melanotic stools   IV Protonix 40 mg b.i.d., Carafate  Hold Xarelto  Hemoglobin is stable more than 10   Monitor for overt bleeding  Concern for interstitial lung disease per CT, possible COPD/emphysema  Mild exacerbation noted   P.o. prednisone 20 mg daily x5 days   On bronchodilators  DVT prophylaxis-bilateral SCDs, no chemical prophylaxis given concern for GI bleed      Kaylin Mohan MD   08/12/2023

## 2023-08-12 NOTE — NURSING
Nurses Note -- 4 Eyes      8/12/2023   4:08 AM      Skin assessed during: Admit      [x] No Altered Skin Integrity Present    []Prevention Measures Documented      [] Yes- Altered Skin Integrity Present or Discovered   [] LDA Added if Not in Epic (Describe Wound)   [] New Altered Skin Integrity was Present on Admit and Documented in LDA   [] Wound Image Taken    Wound Care Consulted? No    Attending Nurse:  Matthew Wu RN/Staff Member:   Dionne Diamond RN

## 2023-08-12 NOTE — CONSULTS
Gastroenterology Consultation Note    Reason for Consult:  The primary encounter diagnosis was Melena. Diagnoses of SOB (shortness of breath), GI bleed, Chest pain, Pericardial effusion, Congestive heart failure, unspecified HF chronicity, unspecified heart failure type, and Elevated INR were also pertinent to this visit.    PCP:   [facility]    Referring MD:  Self, Aaarefquita  No address on file    Hospital Day: 1     Initial History of Present Illness (HPI):  This is a 88 y.o. male, no prior Gi workup, with a history of a-fib, CHF, and hypercholesterolemia presents to Emergency Department with a chief complaint of SOB. Symptoms began several months ago, around November 2022, and have been recurrent since onset. Patient recently hospitalized for new onset A-fib and discharged on 08/06/23. Pt also with complaints of dark stools x 3 days. Hgb has remained stable 11.7--11.5--10.7. No signs of active hemorrhage. We are consulted for possible upper GI bleed.     ROS:  12 point system reviewed and is negative except as noted in HPI     Medical History:   Past Medical History:   Diagnosis Date    A-fib     CHF (congestive heart failure)     Chronic pain     Hypercholesterolemia        Surgical History:   Past Surgical History:   Procedure Laterality Date    FRACTURE SURGERY      TIBIA FRACTURE SURGERY Right        Family History:   Family History   Problem Relation Age of Onset    Hypertension Mother     Heart disease Mother     Hyperlipidemia Mother     Cancer Mother     COPD Mother     Dementia Father    .     Social History:   Social History     Tobacco Use    Smoking status: Never    Smokeless tobacco: Never   Substance Use Topics    Alcohol use: Yes     Comment: Has cut down from 6 to a couple of beer now       Allergies:  Review of patient's allergies indicates:   Allergen Reactions    Colestipol     Meloxicam     Rosuvastatin     Simvastatin     Statins-hmg-coa reductase inhibitors     Tramadol   "      Medications Prior to Admission   Medication Sig Dispense Refill Last Dose    azithromycin (Z-JOSSY) 250 MG tablet Take 2 tablets by mouth on day 1; Take 1 tablet by mouth on days 2-5 6 tablet 0     furosemide (LASIX) 20 MG tablet Take 2 tablets (40 mg total) by mouth once daily. 60 tablet 11     furosemide (LASIX) 40 MG tablet Take 1 tablet (40 mg total) by mouth once daily. (Patient not taking: Reported on 8/8/2023) 30 tablet 0     hawthorn 500 mg Cap Take 1 capsule by mouth.       levalbuterol (XOPENEX) 0.63 mg/3 mL nebulizer solution Take 3 mLs (0.63 mg total) by nebulization every 8 (eight) hours. Rescue 270 mL 0     levalbuterol (XOPENEX) 1.25 mg/3 mL nebulizer solution Take 3 mLs (1.25 mg total) by nebulization every 8 (eight) hours as needed for Wheezing. Rescue (Patient not taking: Reported on 8/8/2023) 270 mL 0     metoprolol succinate (TOPROL-XL) 25 MG 24 hr tablet Take 1 tablet (25 mg total) by mouth once daily. 30 tablet 11     metoprolol succinate (TOPROL-XL) 25 MG 24 hr tablet Take 3 tablets (75 mg total) by mouth once daily. 90 tablet 2     metoprolol succinate (TOPROL-XL) 25 MG 24 hr tablet Take 3 tablets (75 mg total) by mouth once daily. 90 tablet 11     rivaroxaban (XARELTO) 20 mg Tab Take 1 tablet (20 mg total) by mouth daily with dinner or evening meal. 30 tablet 0     rivaroxaban (XARELTO) 20 mg Tab Take 1 tablet (20 mg total) by mouth daily with dinner or evening meal. 30 tablet 0          Objective Findings:    Vital Signs:  BP (!) 150/93   Pulse 79   Temp 98.2 °F (36.8 °C) (Oral)   Resp 18   Ht 5' 10" (1.778 m)   Wt 79.4 kg (175 lb)   SpO2 (!) 94%   BMI 25.11 kg/m²   Body mass index is 25.11 kg/m².    Physical Exam:  General: Angry, frustratedm Appears comfortable, in no acute distress  HEENT: NC/AT  Neck:  No JVD  Chest: diminished BLL but otherwise CTA  CVS: irregular rhythm. Normal S1/S2. 1+ edema bilaterally  Abdomen: nondistended, normoactive BS, soft and non-tender.  MSK: No " obvious deformity or joint swelling  Skin: Warm and dry, bruises everywhere  Neuro: AAOx3, no focal neurological deficit  Psych: angry and combative    Labs:  Recent Results (from the past 48 hour(s))   Comprehensive Metabolic Panel    Collection Time: 08/10/23  1:56 PM   Result Value Ref Range    Sodium Level 140 136 - 145 mmol/L    Potassium Level 4.3 3.5 - 5.1 mmol/L    Chloride 101 98 - 107 mmol/L    Carbon Dioxide 31 23 - 31 mmol/L    Glucose Level 102 82 - 115 mg/dL    Blood Urea Nitrogen 16.8 8.4 - 25.7 mg/dL    Creatinine 1.16 0.73 - 1.18 mg/dL    Calcium Level Total 8.8 8.8 - 10.0 mg/dL    Protein Total 7.0 5.8 - 7.6 gm/dL    Albumin Level 3.0 (L) 3.4 - 4.8 g/dL    Globulin 4.0 (H) 2.4 - 3.5 gm/dL    Albumin/Globulin Ratio 0.8 (L) 1.1 - 2.0 ratio    Bilirubin Total 0.5 <=1.5 mg/dL    Alkaline Phosphatase 61 40 - 150 unit/L    Alanine Aminotransferase 30 0 - 55 unit/L    Aspartate Aminotransferase 22 5 - 34 unit/L    eGFR >60 mls/min/1.73/m2   Brain natriuretic peptide    Collection Time: 08/10/23  1:56 PM   Result Value Ref Range    Natriuretic Peptide 439.2 (H) <=100.0 pg/mL   Troponin I    Collection Time: 08/10/23  1:56 PM   Result Value Ref Range    Troponin-I <0.010 0.000 - 0.045 ng/mL   Protime-INR    Collection Time: 08/10/23  1:56 PM   Result Value Ref Range    PT 35.6 (H) 11.7 - 14.5 seconds    INR 3.6 (H) 2.0 - 3.0   APTT    Collection Time: 08/10/23  1:56 PM   Result Value Ref Range    PTT 52.1 (H) 23.4 - 33.9 seconds   CBC with Differential    Collection Time: 08/10/23  1:56 PM   Result Value Ref Range    WBC 8.49 4.50 - 11.50 x10(3)/mcL    RBC 4.22 (L) 4.70 - 6.10 x10(6)/mcL    Hgb 11.7 (L) 14.0 - 18.0 g/dL    Hct 35.8 (L) 42.0 - 52.0 %    MCV 84.8 80.0 - 94.0 fL    MCH 27.7 27.0 - 31.0 pg    MCHC 32.7 (L) 33.0 - 36.0 g/dL    RDW 15.0 11.5 - 17.0 %    Platelet 390 130 - 400 x10(3)/mcL    MPV 9.4 7.4 - 10.4 fL    Neut % 73.0 %    Lymph % 15.8 %    Mono % 9.7 %    Eos % 0.8 %    Basophil % 0.5 %     Lymph # 1.34 0.6 - 4.6 x10(3)/mcL    Neut # 6.20 2.1 - 9.2 x10(3)/mcL    Mono # 0.82 0.1 - 1.3 x10(3)/mcL    Eos # 0.07 0 - 0.9 x10(3)/mcL    Baso # 0.04 <=0.2 x10(3)/mcL    IG# 0.02 0 - 0.04 x10(3)/mcL    IG% 0.2 %    NRBC% 0.0 %   Respiratory Culture    Collection Time: 08/10/23  2:36 PM    Specimen: Sputum, Expectorated   Result Value Ref Range    Respiratory Culture Normal respiratory anupam     GRAM STAIN Quality 2+     GRAM STAIN Moderate Gram positive cocci     GRAM STAIN Few Gram Positive Rods     GRAM STAIN Few Gram Negative Rods    Comprehensive metabolic panel    Collection Time: 08/11/23 12:59 PM   Result Value Ref Range    Sodium Level 139 136 - 145 mmol/L    Potassium Level 4.4 3.5 - 5.1 mmol/L    Chloride 102 98 - 107 mmol/L    Carbon Dioxide 28 23 - 31 mmol/L    Glucose Level 161 (H) 82 - 115 mg/dL    Blood Urea Nitrogen 16.6 8.4 - 25.7 mg/dL    Creatinine 1.15 0.73 - 1.18 mg/dL    Calcium Level Total 8.6 (L) 8.8 - 10.0 mg/dL    Protein Total 6.4 5.8 - 7.6 gm/dL    Albumin Level 3.0 (L) 3.4 - 4.8 g/dL    Globulin 3.4 2.4 - 3.5 gm/dL    Albumin/Globulin Ratio 0.9 (L) 1.1 - 2.0 ratio    Bilirubin Total 0.6 <=1.5 mg/dL    Alkaline Phosphatase 58 40 - 150 unit/L    Alanine Aminotransferase 27 0 - 55 unit/L    Aspartate Aminotransferase 20 5 - 34 unit/L    eGFR >60 mls/min/1.73/m2   CBC with Differential    Collection Time: 08/11/23 12:59 PM   Result Value Ref Range    WBC 7.87 4.50 - 11.50 x10(3)/mcL    RBC 4.23 (L) 4.70 - 6.10 x10(6)/mcL    Hgb 11.3 (L) 14.0 - 18.0 g/dL    Hct 35.0 (L) 42.0 - 52.0 %    MCV 82.7 80.0 - 94.0 fL    MCH 26.7 (L) 27.0 - 31.0 pg    MCHC 32.3 (L) 33.0 - 36.0 g/dL    RDW 15.1 11.5 - 17.0 %    Platelet 392 130 - 400 x10(3)/mcL    MPV 9.2 7.4 - 10.4 fL    Neut % 71.1 %    Lymph % 17.7 %    Mono % 8.9 %    Eos % 1.4 %    Basophil % 0.4 %    Lymph # 1.39 0.6 - 4.6 x10(3)/mcL    Neut # 5.60 2.1 - 9.2 x10(3)/mcL    Mono # 0.70 0.1 - 1.3 x10(3)/mcL    Eos # 0.11 0 - 0.9 x10(3)/mcL     Baso # 0.03 <=0.2 x10(3)/mcL    IG# 0.04 0 - 0.04 x10(3)/mcL    IG% 0.5 %    NRBC% 0.0 %   Troponin I    Collection Time: 08/11/23 12:59 PM   Result Value Ref Range    Troponin-I 0.017 0.000 - 0.045 ng/mL   Brain natriuretic peptide    Collection Time: 08/11/23 12:59 PM   Result Value Ref Range    Natriuretic Peptide 366.6 (H) <=100.0 pg/mL   Ferritin    Collection Time: 08/11/23 12:59 PM   Result Value Ref Range    Ferritin Level 370.93 (H) 21.81 - 274.66 ng/mL   Iron and TIBC    Collection Time: 08/11/23 12:59 PM   Result Value Ref Range    Iron Binding Capacity Unsaturated 243 (H) 69 - 240 ug/dL    Iron Level 23 (L) 65 - 175 ug/dL    Transferrin 248 mg/dL    Iron Binding Capacity Total 266 250 - 450 ug/dL    Iron Saturation 9 (L) 20 - 50 %   Vitamin B12    Collection Time: 08/11/23 12:59 PM   Result Value Ref Range    Vitamin B12 Level 290 213 - 816 pg/mL   Protime-INR    Collection Time: 08/11/23  1:33 PM   Result Value Ref Range    PT 38.0 (H) 12.5 - 14.5 seconds    INR 4.0 (H) <=1.3   Urinalysis, Reflex to Urine Culture    Collection Time: 08/11/23  3:39 PM    Specimen: Urine   Result Value Ref Range    Color, UA Yellow Yellow, Light-Yellow, Dark Yellow, Viki, Straw    Appearance, UA Clear Clear    Specific Gravity, UA 1.008 1.005 - 1.030    pH, UA 6.5 5.0 - 8.5    Protein, UA Negative Negative    Glucose, UA Negative Negative, Normal    Ketones, UA Negative Negative    Blood, UA Negative Negative    Bilirubin, UA Negative Negative    Urobilinogen, UA 1.0 0.2, 1.0, Normal    Nitrites, UA Negative Negative    Leukocyte Esterase, UA Negative Negative   Urinalysis, Microscopic    Collection Time: 08/11/23  3:39 PM   Result Value Ref Range    RBC, UA <5 <=5 /HPF    WBC, UA <5 <=5 /HPF    Squamous Epithelial Cells, UA <5 <=5 /HPF    Bacteria, UA None Seen None Seen, Rare, Occasional /HPF   Hemoglobin and Hematocrit    Collection Time: 08/11/23 10:11 PM   Result Value Ref Range    Hgb 11.5 (L) 14.0 - 18.0 g/dL     Hct 35.6 (L) 42.0 - 52.0 %   Comprehensive Metabolic Panel    Collection Time: 08/12/23  4:20 AM   Result Value Ref Range    Sodium Level 135 (L) 136 - 145 mmol/L    Potassium Level 3.9 3.5 - 5.1 mmol/L    Chloride 99 98 - 107 mmol/L    Carbon Dioxide 28 23 - 31 mmol/L    Glucose Level 105 82 - 115 mg/dL    Blood Urea Nitrogen 14.1 8.4 - 25.7 mg/dL    Creatinine 1.03 0.73 - 1.18 mg/dL    Calcium Level Total 8.3 (L) 8.8 - 10.0 mg/dL    Protein Total 5.9 5.8 - 7.6 gm/dL    Albumin Level 2.8 (L) 3.4 - 4.8 g/dL    Globulin 3.1 2.4 - 3.5 gm/dL    Albumin/Globulin Ratio 0.9 (L) 1.1 - 2.0 ratio    Bilirubin Total 0.6 <=1.5 mg/dL    Alkaline Phosphatase 54 40 - 150 unit/L    Alanine Aminotransferase 26 0 - 55 unit/L    Aspartate Aminotransferase 19 5 - 34 unit/L    eGFR >60 mls/min/1.73/m2   Magnesium    Collection Time: 08/12/23  4:20 AM   Result Value Ref Range    Magnesium Level 2.10 1.60 - 2.60 mg/dL   Protime-INR    Collection Time: 08/12/23  4:20 AM   Result Value Ref Range    PT 20.7 (H) 12.5 - 14.5 seconds    INR 1.8 (H) <=1.3   Folate    Collection Time: 08/12/23  4:20 AM   Result Value Ref Range    Folate Level 19.1 7.0 - 31.4 ng/mL   Hemoglobin A1C    Collection Time: 08/12/23  4:20 AM   Result Value Ref Range    Hemoglobin A1c 6.6 <=7.0 %    Estimated Average Glucose 142.7 mg/dL   CBC with Differential    Collection Time: 08/12/23  4:20 AM   Result Value Ref Range    WBC 7.57 4.50 - 11.50 x10(3)/mcL    RBC 3.92 (L) 4.70 - 6.10 x10(6)/mcL    Hgb 10.7 (L) 14.0 - 18.0 g/dL    Hct 33.0 (L) 42.0 - 52.0 %    MCV 84.2 80.0 - 94.0 fL    MCH 27.3 27.0 - 31.0 pg    MCHC 32.4 (L) 33.0 - 36.0 g/dL    RDW 15.1 11.5 - 17.0 %    Platelet 364 130 - 400 x10(3)/mcL    MPV 8.8 7.4 - 10.4 fL    Neut % 65.9 %    Lymph % 20.3 %    Mono % 10.6 %    Eos % 2.4 %    Basophil % 0.4 %    Lymph # 1.54 0.6 - 4.6 x10(3)/mcL    Neut # 4.99 2.1 - 9.2 x10(3)/mcL    Mono # 0.80 0.1 - 1.3 x10(3)/mcL    Eos # 0.18 0 - 0.9 x10(3)/mcL    Baso #  0.03 <=0.2 x10(3)/mcL    IG# 0.03 0 - 0.04 x10(3)/mcL    IG% 0.4 %    NRBC% 0.0 %   Echo    Collection Time: 08/12/23 10:58 AM   Result Value Ref Range    BSA 1.98 m2    LVIDd 4.60 3.5 - 6.0 cm    LV Systolic Volume 63.10 mL    LV Systolic Volume Index 32.0 mL/m2    LVIDs 3.83 2.1 - 4.0 cm    LV Diastolic Volume 97.30 mL    LV Diastolic Volume Index 49.39 mL/m2    IVS 1.18 (A) 0.6 - 1.1 cm    FS 17 (A) 28 - 44 %    Left Ventricle Relative Wall Thickness 0.57 cm    Posterior Wall 1.32 (A) 0.6 - 1.1 cm    LV mass 217.40 g    LV Mass Index 110 g/m2    MV Peak E Tyree 0.70 m/s    MV Peak A Tyree 0.28 m/s    TR Max Tyree 2.34 m/s    E/A ratio 2.50     E wave deceleration time 201.00 msec    LVOT peak tyree 0.85 m/s    Left Ventricular Outflow Tract Mean Velocity 0.52 cm/s    Left Ventricular Outflow Tract Mean Gradient 1.00 mmHg    LA volume (mod) 133.00 cm3    LA Volume Index (Mod) 67.5 mL/m2    TAPSE 1.07 cm    AV mean gradient 3 mmHg    AV peak gradient 6 mmHg    Ao peak tyree 1.21 m/s    Ao VTI 19.80 cm    LVOT peak VTI 16.30 cm    AV Velocity Ratio 0.70     AV index (prosthetic) 0.82     Triscuspid Valve Regurgitation Peak Gradient 22 mmHg    ZLVIDS 0.76     ZLVIDD -2.08        CT Chest W Wo Contrast         X-Ray Chest AP Portable   Final Result      No adverse interval change.  Left-sided pleural effusion remains.         Electronically signed by: Linwood Loera   Date:    08/11/2023   Time:    15:17          Imaging: reviewed    Endoscopy:      none  Assessment/Plan:  Melena  HGB stable 11.7--11.5--10.7  New Afib  On Xaralto  Acute COPD exacerbation  Acute CHF exacerbation    Thank you for allowing us to participate in the care of Lizandro Martinez.     Pauline Kwon NP as scribe for Dr. Presley Benjamin

## 2023-08-13 LAB
ALBUMIN SERPL-MCNC: 3 G/DL (ref 3.4–4.8)
ALBUMIN/GLOB SERPL: 0.9 RATIO (ref 1.1–2)
ALP SERPL-CCNC: 60 UNIT/L (ref 40–150)
ALT SERPL-CCNC: 28 UNIT/L (ref 0–55)
AST SERPL-CCNC: 22 UNIT/L (ref 5–34)
BASOPHILS # BLD AUTO: 0.05 X10(3)/MCL
BASOPHILS NFR BLD AUTO: 0.6 %
BILIRUB SERPL-MCNC: 0.6 MG/DL
BUN SERPL-MCNC: 11.6 MG/DL (ref 8.4–25.7)
CALCIUM SERPL-MCNC: 8.7 MG/DL (ref 8.8–10)
CHLORIDE SERPL-SCNC: 98 MMOL/L (ref 98–107)
CO2 SERPL-SCNC: 29 MMOL/L (ref 23–31)
CREAT SERPL-MCNC: 1.17 MG/DL (ref 0.73–1.18)
EOSINOPHIL # BLD AUTO: 0.2 X10(3)/MCL (ref 0–0.9)
EOSINOPHIL NFR BLD AUTO: 2.3 %
ERYTHROCYTE [DISTWIDTH] IN BLOOD BY AUTOMATED COUNT: 14.9 % (ref 11.5–17)
GFR SERPLBLD CREATININE-BSD FMLA CKD-EPI: >60 MLS/MIN/1.73/M2
GLOBULIN SER-MCNC: 3.4 GM/DL (ref 2.4–3.5)
GLUCOSE SERPL-MCNC: 111 MG/DL (ref 82–115)
HCT VFR BLD AUTO: 36.3 % (ref 42–52)
HGB BLD-MCNC: 11.9 G/DL (ref 14–18)
IMM GRANULOCYTES # BLD AUTO: 0.03 X10(3)/MCL (ref 0–0.04)
IMM GRANULOCYTES NFR BLD AUTO: 0.3 %
LYMPHOCYTES # BLD AUTO: 1.85 X10(3)/MCL (ref 0.6–4.6)
LYMPHOCYTES NFR BLD AUTO: 21.2 %
MCH RBC QN AUTO: 27 PG (ref 27–31)
MCHC RBC AUTO-ENTMCNC: 32.8 G/DL (ref 33–36)
MCV RBC AUTO: 82.5 FL (ref 80–94)
MONOCYTES # BLD AUTO: 0.89 X10(3)/MCL (ref 0.1–1.3)
MONOCYTES NFR BLD AUTO: 10.2 %
NEUTROPHILS # BLD AUTO: 5.69 X10(3)/MCL (ref 2.1–9.2)
NEUTROPHILS NFR BLD AUTO: 65.4 %
NRBC BLD AUTO-RTO: 0 %
PLATELET # BLD AUTO: 438 X10(3)/MCL (ref 130–400)
PMV BLD AUTO: 8.9 FL (ref 7.4–10.4)
POTASSIUM SERPL-SCNC: 4.3 MMOL/L (ref 3.5–5.1)
PROT SERPL-MCNC: 6.4 GM/DL (ref 5.8–7.6)
RBC # BLD AUTO: 4.4 X10(6)/MCL (ref 4.7–6.1)
SODIUM SERPL-SCNC: 139 MMOL/L (ref 136–145)
WBC # SPEC AUTO: 8.71 X10(3)/MCL (ref 4.5–11.5)

## 2023-08-13 PROCEDURE — 25000003 PHARM REV CODE 250: Performed by: NURSE PRACTITIONER

## 2023-08-13 PROCEDURE — 85025 COMPLETE CBC W/AUTO DIFF WBC: CPT | Performed by: INTERNAL MEDICINE

## 2023-08-13 PROCEDURE — 25000003 PHARM REV CODE 250: Performed by: INTERNAL MEDICINE

## 2023-08-13 PROCEDURE — 25000242 PHARM REV CODE 250 ALT 637 W/ HCPCS: Performed by: NURSE PRACTITIONER

## 2023-08-13 PROCEDURE — 63600175 PHARM REV CODE 636 W HCPCS: Performed by: INTERNAL MEDICINE

## 2023-08-13 PROCEDURE — 80053 COMPREHEN METABOLIC PANEL: CPT | Performed by: INTERNAL MEDICINE

## 2023-08-13 PROCEDURE — 21400001 HC TELEMETRY ROOM

## 2023-08-13 PROCEDURE — C9113 INJ PANTOPRAZOLE SODIUM, VIA: HCPCS | Performed by: INTERNAL MEDICINE

## 2023-08-13 RX ORDER — PANTOPRAZOLE SODIUM 40 MG/1
40 TABLET, DELAYED RELEASE ORAL
Status: DISCONTINUED | OUTPATIENT
Start: 2023-08-13 | End: 2023-08-18 | Stop reason: HOSPADM

## 2023-08-13 RX ADMIN — GUAIFENESIN 600 MG: 600 TABLET, EXTENDED RELEASE ORAL at 09:08

## 2023-08-13 RX ADMIN — RIVAROXABAN 20 MG: 10 TABLET, FILM COATED ORAL at 05:08

## 2023-08-13 RX ADMIN — FUROSEMIDE 40 MG: 10 INJECTION, SOLUTION INTRAMUSCULAR; INTRAVENOUS at 05:08

## 2023-08-13 RX ADMIN — SUCRALFATE 1 G: 1 TABLET ORAL at 05:08

## 2023-08-13 RX ADMIN — PANTOPRAZOLE SODIUM 40 MG: 40 INJECTION, POWDER, LYOPHILIZED, FOR SOLUTION INTRAVENOUS at 08:08

## 2023-08-13 RX ADMIN — GUAIFENESIN 600 MG: 600 TABLET, EXTENDED RELEASE ORAL at 08:08

## 2023-08-13 RX ADMIN — SUCRALFATE 1 G: 1 TABLET ORAL at 04:08

## 2023-08-13 RX ADMIN — METOPROLOL SUCCINATE 75 MG: 50 TABLET, EXTENDED RELEASE ORAL at 08:08

## 2023-08-13 RX ADMIN — PREDNISONE 20 MG: 20 TABLET ORAL at 08:08

## 2023-08-13 RX ADMIN — FLUTICASONE FUROATE AND VILANTEROL TRIFENATATE 1 PUFF: 100; 25 POWDER RESPIRATORY (INHALATION) at 09:08

## 2023-08-13 RX ADMIN — PANTOPRAZOLE SODIUM 40 MG: 40 TABLET, DELAYED RELEASE ORAL at 04:08

## 2023-08-13 RX ADMIN — SUCRALFATE 1 G: 1 TABLET ORAL at 11:08

## 2023-08-13 RX ADMIN — SUCRALFATE 1 G: 1 TABLET ORAL at 09:08

## 2023-08-13 NOTE — CONSULTS
Inpatient consult to Cardiology  Consult performed by: Dilan Crockett FNP  Consult ordered by: Kaylin Mohan MD  Reason for consult: Heart Failure/Reduced LV Function        [facility] - 8th Floor Med Surg    Cardiology  Consult Note    Patient Name: Lizandro Martinez  MRN: [id number]  Admission Date: 8/11/2023  Hospital Length of Stay: 2 days  Code Status: Full Code   Attending Provider: Kaylin Mohan MD   Consulting Provider: CHARLIE Medrano  Primary Care Physician: [facility]  Principal Problem:GI bleed    Patient information was obtained from patient, past medical records, ER records, and primary team.     Subjective:   Consultation Reason: Heart Failure/LV Function Reduced    HPI:   Mr. Martinez is a 88 year old male, known to Dr. CIS through previous hospitalization (Dr. Skelton), who presented to the hospital with dark stools and worsening shortness of breath. Patient was recently evaluated in the hospital on 8.4.23-8.6.23 where CIS evaluated the patient for AF and HF. He was diuresed and placed on Xarelto and beta blockade and discharged home, to return with above stated symptoms. On 8.5.23 EF 45-50%, repeat Echocardiogram on 8.12.23 EF noted to be 35%. Hemodynamically, patient stable. BNP noted to be 439, 366. Troponin values noted to be normal.   CT Imaging revealed widespread chronic lung parenchymal changes without evidence of acute pulmonary process & Small volume pericardial and bilateral pleural effusions. CIS is consulted given reduction in LV Function on echocardiogram.    PMH: AF (Xarelto), LVSD EF 45-50%, Hypertension, Hyperlipidemia, Chronic Pain, History of Medical Noncompliance, Nicotine Dependence  PSH: Fracture Surgery  Family History: Mother- Hypertension/Hyperlipidemia/Cancer, Father- Dementia  Social History: Tobacco- Heavy Smoker/Active Use, Alcohol- Daily Use/6 Pack Beer Daily, Substance Abuse- Negative    Previous Cardiac Diagnostics:   CT Chest with/without  Contrast (8.12.23):  IMPRESSION  Widespread chronic lung parenchymal changes without evidence of acute pulmonary process.  Small volume pericardial and bilateral pleural effusions.  Chronic sequela of prior granulomatous disease.  Additional chronic secondary details discussed above.    Echocardiogram (8.12.23):  Left Ventricle: The left ventricle is normal in size. Mildly increased wall thickness. Moderate global hypokinesis present. There is moderately reduced systolic function. Ejection fraction by visual approximation is 35%.  Left Atrium: Left atrium is severely dilated.  Right Ventricle: Mild right ventricular enlargement. Systolic function is mildly reduced.  Right Atrium: Right atrium is moderately dilated.  Aortic Valve: There is mild aortic valve sclerosis. There is mild aortic regurgitation.  Mitral Valve: There is no stenosis. There is mild regurgitation.  Tricuspid Valve: There is mild regurgitation.  Pulmonic Valve: There is no significant stenosis. There is mild regurgitation.  Pericardium: Left pleural effusion.    Echocardiogram (8.5.23):  Left Ventricle: The left ventricle is mildly dilated. Normal wall thickness. Normal wall motion. There is mildly reduced systolic function with a visually estimated ejection fraction of 45 - 50%. Unable to assess diastolic function due to arrhythmia. Elevated left ventricular filling pressure.  Left Atrium: Left atrium is severely dilated.  Right Ventricle: Mild right ventricular enlargement.   Systolic function is normal.Right Atrium: Right atrium is severely dilated.  Aortic Valve: The aortic valve is a trileaflet valve. Mildly restricted motion.  Mitral Valve: Moderately thickened leaflets. There is mild regurgitation.  Pulmonic Valve: There is mild regurgitation.  Pericardium: Small circumferential pericardial effusion present. No indication of cardiac tamponade.    Review of patient's allergies indicates:   Allergen Reactions    Colestipol     Meloxicam      Rosuvastatin     Simvastatin     Statins-hmg-coa reductase inhibitors     Tramadol        No current facility-administered medications on file prior to encounter.     Current Outpatient Medications on File Prior to Encounter   Medication Sig    azithromycin (Z-JOSSY) 250 MG tablet Take 2 tablets by mouth on day 1; Take 1 tablet by mouth on days 2-5    furosemide (LASIX) 20 MG tablet Take 2 tablets (40 mg total) by mouth once daily.    furosemide (LASIX) 40 MG tablet Take 1 tablet (40 mg total) by mouth once daily. (Patient not taking: Reported on 8/8/2023)    hawthorn 500 mg Cap Take 1 capsule by mouth.    levalbuterol (XOPENEX) 0.63 mg/3 mL nebulizer solution Take 3 mLs (0.63 mg total) by nebulization every 8 (eight) hours. Rescue    levalbuterol (XOPENEX) 1.25 mg/3 mL nebulizer solution Take 3 mLs (1.25 mg total) by nebulization every 8 (eight) hours as needed for Wheezing. Rescue (Patient not taking: Reported on 8/8/2023)    metoprolol succinate (TOPROL-XL) 25 MG 24 hr tablet Take 1 tablet (25 mg total) by mouth once daily.    metoprolol succinate (TOPROL-XL) 25 MG 24 hr tablet Take 3 tablets (75 mg total) by mouth once daily.    metoprolol succinate (TOPROL-XL) 25 MG 24 hr tablet Take 3 tablets (75 mg total) by mouth once daily.    rivaroxaban (XARELTO) 20 mg Tab Take 1 tablet (20 mg total) by mouth daily with dinner or evening meal.    rivaroxaban (XARELTO) 20 mg Tab Take 1 tablet (20 mg total) by mouth daily with dinner or evening meal.     Review of Systems   Constitutional:  Positive for fatigue.   Respiratory:  Negative for chest tightness and shortness of breath.    Cardiovascular:  Negative for chest pain.   Gastrointestinal:  Positive for blood in stool.   All other systems reviewed and are negative.      Objective:     Vital Signs (Most Recent):  Temp: 99 °F (37.2 °C) (08/13/23 0444)  Pulse: 84 (08/13/23 0444)  Resp: 18 (08/13/23 0442)  BP: 129/86 (08/13/23 0444)  SpO2: (!) 92 % (08/13/23 0444) Vital  Signs (24h Range):  Temp:  [97.5 °F (36.4 °C)-99 °F (37.2 °C)] 99 °F (37.2 °C)  Pulse:  [61-89] 84  Resp:  [18] 18  SpO2:  [92 %-95 %] 92 %  BP: (116-150)/(72-93) 129/86     Weight: 74.9 kg (165 lb 2 oz)  Body mass index is 23.69 kg/m².    SpO2: (!) 92 %         Intake/Output Summary (Last 24 hours) at 8/13/2023 0736  Last data filed at 8/13/2023 0500  Gross per 24 hour   Intake 960 ml   Output 2475 ml   Net -1515 ml       Lines/Drains/Airways       Peripheral Intravenous Line  Duration                  Peripheral IV - Single Lumen 08/11/23 1539 22 G Anterior;Left;Proximal Forearm 1 day                  Significant Labs:  Recent Results (from the past 72 hour(s))   Comprehensive Metabolic Panel    Collection Time: 08/10/23  1:56 PM   Result Value Ref Range    Sodium Level 140 136 - 145 mmol/L    Potassium Level 4.3 3.5 - 5.1 mmol/L    Chloride 101 98 - 107 mmol/L    Carbon Dioxide 31 23 - 31 mmol/L    Glucose Level 102 82 - 115 mg/dL    Blood Urea Nitrogen 16.8 8.4 - 25.7 mg/dL    Creatinine 1.16 0.73 - 1.18 mg/dL    Calcium Level Total 8.8 8.8 - 10.0 mg/dL    Protein Total 7.0 5.8 - 7.6 gm/dL    Albumin Level 3.0 (L) 3.4 - 4.8 g/dL    Globulin 4.0 (H) 2.4 - 3.5 gm/dL    Albumin/Globulin Ratio 0.8 (L) 1.1 - 2.0 ratio    Bilirubin Total 0.5 <=1.5 mg/dL    Alkaline Phosphatase 61 40 - 150 unit/L    Alanine Aminotransferase 30 0 - 55 unit/L    Aspartate Aminotransferase 22 5 - 34 unit/L    eGFR >60 mls/min/1.73/m2   Brain natriuretic peptide    Collection Time: 08/10/23  1:56 PM   Result Value Ref Range    Natriuretic Peptide 439.2 (H) <=100.0 pg/mL   Troponin I    Collection Time: 08/10/23  1:56 PM   Result Value Ref Range    Troponin-I <0.010 0.000 - 0.045 ng/mL   Protime-INR    Collection Time: 08/10/23  1:56 PM   Result Value Ref Range    PT 35.6 (H) 11.7 - 14.5 seconds    INR 3.6 (H) 2.0 - 3.0   APTT    Collection Time: 08/10/23  1:56 PM   Result Value Ref Range    PTT 52.1 (H) 23.4 - 33.9 seconds   CBC with  Differential    Collection Time: 08/10/23  1:56 PM   Result Value Ref Range    WBC 8.49 4.50 - 11.50 x10(3)/mcL    RBC 4.22 (L) 4.70 - 6.10 x10(6)/mcL    Hgb 11.7 (L) 14.0 - 18.0 g/dL    Hct 35.8 (L) 42.0 - 52.0 %    MCV 84.8 80.0 - 94.0 fL    MCH 27.7 27.0 - 31.0 pg    MCHC 32.7 (L) 33.0 - 36.0 g/dL    RDW 15.0 11.5 - 17.0 %    Platelet 390 130 - 400 x10(3)/mcL    MPV 9.4 7.4 - 10.4 fL    Neut % 73.0 %    Lymph % 15.8 %    Mono % 9.7 %    Eos % 0.8 %    Basophil % 0.5 %    Lymph # 1.34 0.6 - 4.6 x10(3)/mcL    Neut # 6.20 2.1 - 9.2 x10(3)/mcL    Mono # 0.82 0.1 - 1.3 x10(3)/mcL    Eos # 0.07 0 - 0.9 x10(3)/mcL    Baso # 0.04 <=0.2 x10(3)/mcL    IG# 0.02 0 - 0.04 x10(3)/mcL    IG% 0.2 %    NRBC% 0.0 %   Respiratory Culture    Collection Time: 08/10/23  2:36 PM    Specimen: Sputum, Expectorated   Result Value Ref Range    Respiratory Culture Normal respiratory anupam     GRAM STAIN Quality 2+     GRAM STAIN Moderate Gram positive cocci     GRAM STAIN Few Gram Positive Rods     GRAM STAIN Few Gram Negative Rods    Comprehensive metabolic panel    Collection Time: 08/11/23 12:59 PM   Result Value Ref Range    Sodium Level 139 136 - 145 mmol/L    Potassium Level 4.4 3.5 - 5.1 mmol/L    Chloride 102 98 - 107 mmol/L    Carbon Dioxide 28 23 - 31 mmol/L    Glucose Level 161 (H) 82 - 115 mg/dL    Blood Urea Nitrogen 16.6 8.4 - 25.7 mg/dL    Creatinine 1.15 0.73 - 1.18 mg/dL    Calcium Level Total 8.6 (L) 8.8 - 10.0 mg/dL    Protein Total 6.4 5.8 - 7.6 gm/dL    Albumin Level 3.0 (L) 3.4 - 4.8 g/dL    Globulin 3.4 2.4 - 3.5 gm/dL    Albumin/Globulin Ratio 0.9 (L) 1.1 - 2.0 ratio    Bilirubin Total 0.6 <=1.5 mg/dL    Alkaline Phosphatase 58 40 - 150 unit/L    Alanine Aminotransferase 27 0 - 55 unit/L    Aspartate Aminotransferase 20 5 - 34 unit/L    eGFR >60 mls/min/1.73/m2   CBC with Differential    Collection Time: 08/11/23 12:59 PM   Result Value Ref Range    WBC 7.87 4.50 - 11.50 x10(3)/mcL    RBC 4.23 (L) 4.70 - 6.10  x10(6)/mcL    Hgb 11.3 (L) 14.0 - 18.0 g/dL    Hct 35.0 (L) 42.0 - 52.0 %    MCV 82.7 80.0 - 94.0 fL    MCH 26.7 (L) 27.0 - 31.0 pg    MCHC 32.3 (L) 33.0 - 36.0 g/dL    RDW 15.1 11.5 - 17.0 %    Platelet 392 130 - 400 x10(3)/mcL    MPV 9.2 7.4 - 10.4 fL    Neut % 71.1 %    Lymph % 17.7 %    Mono % 8.9 %    Eos % 1.4 %    Basophil % 0.4 %    Lymph # 1.39 0.6 - 4.6 x10(3)/mcL    Neut # 5.60 2.1 - 9.2 x10(3)/mcL    Mono # 0.70 0.1 - 1.3 x10(3)/mcL    Eos # 0.11 0 - 0.9 x10(3)/mcL    Baso # 0.03 <=0.2 x10(3)/mcL    IG# 0.04 0 - 0.04 x10(3)/mcL    IG% 0.5 %    NRBC% 0.0 %   Troponin I    Collection Time: 08/11/23 12:59 PM   Result Value Ref Range    Troponin-I 0.017 0.000 - 0.045 ng/mL   Brain natriuretic peptide    Collection Time: 08/11/23 12:59 PM   Result Value Ref Range    Natriuretic Peptide 366.6 (H) <=100.0 pg/mL   Ferritin    Collection Time: 08/11/23 12:59 PM   Result Value Ref Range    Ferritin Level 370.93 (H) 21.81 - 274.66 ng/mL   Iron and TIBC    Collection Time: 08/11/23 12:59 PM   Result Value Ref Range    Iron Binding Capacity Unsaturated 243 (H) 69 - 240 ug/dL    Iron Level 23 (L) 65 - 175 ug/dL    Transferrin 248 mg/dL    Iron Binding Capacity Total 266 250 - 450 ug/dL    Iron Saturation 9 (L) 20 - 50 %   Vitamin B12    Collection Time: 08/11/23 12:59 PM   Result Value Ref Range    Vitamin B12 Level 290 213 - 816 pg/mL   Protime-INR    Collection Time: 08/11/23  1:33 PM   Result Value Ref Range    PT 38.0 (H) 12.5 - 14.5 seconds    INR 4.0 (H) <=1.3   Urinalysis, Reflex to Urine Culture    Collection Time: 08/11/23  3:39 PM    Specimen: Urine   Result Value Ref Range    Color, UA Yellow Yellow, Light-Yellow, Dark Yellow, Viki, Straw    Appearance, UA Clear Clear    Specific Gravity, UA 1.008 1.005 - 1.030    pH, UA 6.5 5.0 - 8.5    Protein, UA Negative Negative    Glucose, UA Negative Negative, Normal    Ketones, UA Negative Negative    Blood, UA Negative Negative    Bilirubin, UA Negative Negative     Urobilinogen, UA 1.0 0.2, 1.0, Normal    Nitrites, UA Negative Negative    Leukocyte Esterase, UA Negative Negative   Urinalysis, Microscopic    Collection Time: 08/11/23  3:39 PM   Result Value Ref Range    RBC, UA <5 <=5 /HPF    WBC, UA <5 <=5 /HPF    Squamous Epithelial Cells, UA <5 <=5 /HPF    Bacteria, UA None Seen None Seen, Rare, Occasional /HPF   Hemoglobin and Hematocrit    Collection Time: 08/11/23 10:11 PM   Result Value Ref Range    Hgb 11.5 (L) 14.0 - 18.0 g/dL    Hct 35.6 (L) 42.0 - 52.0 %   Comprehensive Metabolic Panel    Collection Time: 08/12/23  4:20 AM   Result Value Ref Range    Sodium Level 135 (L) 136 - 145 mmol/L    Potassium Level 3.9 3.5 - 5.1 mmol/L    Chloride 99 98 - 107 mmol/L    Carbon Dioxide 28 23 - 31 mmol/L    Glucose Level 105 82 - 115 mg/dL    Blood Urea Nitrogen 14.1 8.4 - 25.7 mg/dL    Creatinine 1.03 0.73 - 1.18 mg/dL    Calcium Level Total 8.3 (L) 8.8 - 10.0 mg/dL    Protein Total 5.9 5.8 - 7.6 gm/dL    Albumin Level 2.8 (L) 3.4 - 4.8 g/dL    Globulin 3.1 2.4 - 3.5 gm/dL    Albumin/Globulin Ratio 0.9 (L) 1.1 - 2.0 ratio    Bilirubin Total 0.6 <=1.5 mg/dL    Alkaline Phosphatase 54 40 - 150 unit/L    Alanine Aminotransferase 26 0 - 55 unit/L    Aspartate Aminotransferase 19 5 - 34 unit/L    eGFR >60 mls/min/1.73/m2   Magnesium    Collection Time: 08/12/23  4:20 AM   Result Value Ref Range    Magnesium Level 2.10 1.60 - 2.60 mg/dL   Protime-INR    Collection Time: 08/12/23  4:20 AM   Result Value Ref Range    PT 20.7 (H) 12.5 - 14.5 seconds    INR 1.8 (H) <=1.3   Folate    Collection Time: 08/12/23  4:20 AM   Result Value Ref Range    Folate Level 19.1 7.0 - 31.4 ng/mL   Hemoglobin A1C    Collection Time: 08/12/23  4:20 AM   Result Value Ref Range    Hemoglobin A1c 6.6 <=7.0 %    Estimated Average Glucose 142.7 mg/dL   CBC with Differential    Collection Time: 08/12/23  4:20 AM   Result Value Ref Range    WBC 7.57 4.50 - 11.50 x10(3)/mcL    RBC 3.92 (L) 4.70 - 6.10  x10(6)/mcL    Hgb 10.7 (L) 14.0 - 18.0 g/dL    Hct 33.0 (L) 42.0 - 52.0 %    MCV 84.2 80.0 - 94.0 fL    MCH 27.3 27.0 - 31.0 pg    MCHC 32.4 (L) 33.0 - 36.0 g/dL    RDW 15.1 11.5 - 17.0 %    Platelet 364 130 - 400 x10(3)/mcL    MPV 8.8 7.4 - 10.4 fL    Neut % 65.9 %    Lymph % 20.3 %    Mono % 10.6 %    Eos % 2.4 %    Basophil % 0.4 %    Lymph # 1.54 0.6 - 4.6 x10(3)/mcL    Neut # 4.99 2.1 - 9.2 x10(3)/mcL    Mono # 0.80 0.1 - 1.3 x10(3)/mcL    Eos # 0.18 0 - 0.9 x10(3)/mcL    Baso # 0.03 <=0.2 x10(3)/mcL    IG# 0.03 0 - 0.04 x10(3)/mcL    IG% 0.4 %    NRBC% 0.0 %   Echo    Collection Time: 08/12/23 10:58 AM   Result Value Ref Range    BSA 1.98 m2    LVIDd 4.60 3.5 - 6.0 cm    LV Systolic Volume 63.10 mL    LV Systolic Volume Index 32.0 mL/m2    LVIDs 3.83 2.1 - 4.0 cm    LV Diastolic Volume 97.30 mL    LV Diastolic Volume Index 49.39 mL/m2    IVS 1.18 (A) 0.6 - 1.1 cm    FS 17 (A) 28 - 44 %    Left Ventricle Relative Wall Thickness 0.57 cm    Posterior Wall 1.32 (A) 0.6 - 1.1 cm    LV mass 217.40 g    LV Mass Index 110 g/m2    MV Peak E Tyree 0.70 m/s    MV Peak A Tyree 0.28 m/s    TR Max Tyree 2.34 m/s    E/A ratio 2.50     E wave deceleration time 201.00 msec    LVOT peak tyree 0.85 m/s    Left Ventricular Outflow Tract Mean Velocity 0.52 cm/s    Left Ventricular Outflow Tract Mean Gradient 1.00 mmHg    LA volume (mod) 133.00 cm3    LA Volume Index (Mod) 67.5 mL/m2    TAPSE 1.07 cm    AV mean gradient 3 mmHg    AV peak gradient 6 mmHg    Ao peak tyree 1.21 m/s    Ao VTI 19.80 cm    LVOT peak VTI 16.30 cm    AV Velocity Ratio 0.70     AV index (prosthetic) 0.82     Triscuspid Valve Regurgitation Peak Gradient 22 mmHg    ZLVIDS 0.76     ZLVIDD -2.08     EF 35 %   Comprehensive Metabolic Panel    Collection Time: 08/13/23  3:48 AM   Result Value Ref Range    Sodium Level 139 136 - 145 mmol/L    Potassium Level 4.3 3.5 - 5.1 mmol/L    Chloride 98 98 - 107 mmol/L    Carbon Dioxide 29 23 - 31 mmol/L    Glucose Level 111 82 - 115  mg/dL    Blood Urea Nitrogen 11.6 8.4 - 25.7 mg/dL    Creatinine 1.17 0.73 - 1.18 mg/dL    Calcium Level Total 8.7 (L) 8.8 - 10.0 mg/dL    Protein Total 6.4 5.8 - 7.6 gm/dL    Albumin Level 3.0 (L) 3.4 - 4.8 g/dL    Globulin 3.4 2.4 - 3.5 gm/dL    Albumin/Globulin Ratio 0.9 (L) 1.1 - 2.0 ratio    Bilirubin Total 0.6 <=1.5 mg/dL    Alkaline Phosphatase 60 40 - 150 unit/L    Alanine Aminotransferase 28 0 - 55 unit/L    Aspartate Aminotransferase 22 5 - 34 unit/L    eGFR >60 mls/min/1.73/m2   CBC with Differential    Collection Time: 08/13/23  3:48 AM   Result Value Ref Range    WBC 8.71 4.50 - 11.50 x10(3)/mcL    RBC 4.40 (L) 4.70 - 6.10 x10(6)/mcL    Hgb 11.9 (L) 14.0 - 18.0 g/dL    Hct 36.3 (L) 42.0 - 52.0 %    MCV 82.5 80.0 - 94.0 fL    MCH 27.0 27.0 - 31.0 pg    MCHC 32.8 (L) 33.0 - 36.0 g/dL    RDW 14.9 11.5 - 17.0 %    Platelet 438 (H) 130 - 400 x10(3)/mcL    MPV 8.9 7.4 - 10.4 fL    Neut % 65.4 %    Lymph % 21.2 %    Mono % 10.2 %    Eos % 2.3 %    Basophil % 0.6 %    Lymph # 1.85 0.6 - 4.6 x10(3)/mcL    Neut # 5.69 2.1 - 9.2 x10(3)/mcL    Mono # 0.89 0.1 - 1.3 x10(3)/mcL    Eos # 0.20 0 - 0.9 x10(3)/mcL    Baso # 0.05 <=0.2 x10(3)/mcL    IG# 0.03 0 - 0.04 x10(3)/mcL    IG% 0.3 %    NRBC% 0.0 %     Significant Imaging:  Imaging Results              X-Ray Chest AP Portable (Final result)  Result time 08/11/23 15:17:42      Final result by Linwood Loera MD (08/11/23 15:17:42)                   Impression:      No adverse interval change.  Left-sided pleural effusion remains.      Electronically signed by: Linwood Loera  Date:    08/11/2023  Time:    15:17               Narrative:    EXAMINATION:  XR CHEST AP PORTABLE    CLINICAL HISTORY:  Shortness of breath    TECHNIQUE:  Single view of the chest    COMPARISON:  08/10/2023    FINDINGS:  No focal opacification.  Left-sided pleural effusion similar to prior.  Cardiomegaly is unchanged.  No pneumothorax.                                    EKG:         Telemetry:  AF    Physical Exam  Vitals and nursing note reviewed.   Constitutional:       General: He is not in acute distress.     Appearance: Normal appearance.   HENT:      Head: Normocephalic.      Mouth/Throat:      Mouth: Mucous membranes are moist.      Pharynx: Oropharynx is clear.   Cardiovascular:      Rate and Rhythm: Normal rate. Rhythm irregular.      Heart sounds: Normal heart sounds.   Pulmonary:      Effort: Pulmonary effort is normal. No respiratory distress.   Abdominal:      Palpations: Abdomen is soft.      Tenderness: There is no guarding.   Musculoskeletal:      Cervical back: Neck supple.   Skin:     General: Skin is warm and dry.   Neurological:      Mental Status: He is alert. Mental status is at baseline.   Psychiatric:         Behavior: Behavior normal.       Home Medications:   No current facility-administered medications on file prior to encounter.     Current Outpatient Medications on File Prior to Encounter   Medication Sig Dispense Refill    azithromycin (Z-JOSSY) 250 MG tablet Take 2 tablets by mouth on day 1; Take 1 tablet by mouth on days 2-5 6 tablet 0    furosemide (LASIX) 20 MG tablet Take 2 tablets (40 mg total) by mouth once daily. 60 tablet 11    furosemide (LASIX) 40 MG tablet Take 1 tablet (40 mg total) by mouth once daily. (Patient not taking: Reported on 8/8/2023) 30 tablet 0    hawthorn 500 mg Cap Take 1 capsule by mouth.      levalbuterol (XOPENEX) 0.63 mg/3 mL nebulizer solution Take 3 mLs (0.63 mg total) by nebulization every 8 (eight) hours. Rescue 270 mL 0    levalbuterol (XOPENEX) 1.25 mg/3 mL nebulizer solution Take 3 mLs (1.25 mg total) by nebulization every 8 (eight) hours as needed for Wheezing. Rescue (Patient not taking: Reported on 8/8/2023) 270 mL 0    metoprolol succinate (TOPROL-XL) 25 MG 24 hr tablet Take 1 tablet (25 mg total) by mouth once daily. 30 tablet 11    metoprolol succinate (TOPROL-XL) 25 MG 24 hr tablet Take 3 tablets (75 mg total) by  mouth once daily. 90 tablet 2    metoprolol succinate (TOPROL-XL) 25 MG 24 hr tablet Take 3 tablets (75 mg total) by mouth once daily. 90 tablet 11    rivaroxaban (XARELTO) 20 mg Tab Take 1 tablet (20 mg total) by mouth daily with dinner or evening meal. 30 tablet 0    rivaroxaban (XARELTO) 20 mg Tab Take 1 tablet (20 mg total) by mouth daily with dinner or evening meal. 30 tablet 0     Current Inpatient Medications:    Current Facility-Administered Medications:     acetaminophen tablet 1,000 mg, 1,000 mg, Oral, Q6H PRN, Edmond Auguste MD, 1,000 mg at 08/12/23 1205    acetaminophen tablet 650 mg, 650 mg, Oral, Q4H PRN, Edmond Auguste MD    aluminum-magnesium hydroxide-simethicone 200-200-20 mg/5 mL suspension 30 mL, 30 mL, Oral, QID PRN, Edmond Auguste MD    fluticasone furoate-vilanteroL 100-25 mcg/dose diskus inhaler 1 puff, 1 puff, Inhalation, Daily, Aurora Fernández, FNP, 1 puff at 08/12/23 0918    furosemide injection 40 mg, 40 mg, Intravenous, Q12H, Edmond Auguste MD, 40 mg at 08/13/23 0549    guaiFENesin 100 mg/5 ml syrup 200 mg, 200 mg, Oral, Q4H PRN, Aurora Fernández, FNP    guaiFENesin 12 hr tablet 600 mg, 600 mg, Oral, BID, Edmond Auguste MD, 600 mg at 08/12/23 2047    levalbuterol nebulizer solution 1.25 mg, 1 ampule, Nebulization, Q8H PRN, Edmond Auguste MD    melatonin tablet 6 mg, 6 mg, Oral, Nightly PRN, Edmond Auguste MD    metoprolol injection 5 mg, 5 mg, Intravenous, Q6H PRN, Edmond Auguste MD    metoprolol succinate 24 hr tablet 75 mg, 75 mg, Oral, Daily, Edmond Auguste MD, 75 mg at 08/12/23 0915    ondansetron injection 4 mg, 4 mg, Intravenous, Q4H PRN, Edmond Auguste MD    pantoprazole injection 40 mg, 40 mg, Intravenous, Q12H, MolinaEdmond morgan MD, 40 mg at 08/12/23 2047    polyethylene glycol packet 17 g, 17 g, Oral, BID PRN, Edmond Auguste MD    predniSONE tablet 20 mg, 20 mg, Oral, Daily, Kaylin Mohan MD    prochlorperazine injection Soln 5 mg, 5 mg, Intravenous, Q6H PRN, Edmond Auguste MD    senna-docusate  8.6-50 mg per tablet 2 tablet, 2 tablet, Oral, BID PRN, Edmond Auguste MD    sodium chloride 0.9% flush 10 mL, 10 mL, Intravenous, PRN, Edmond Auguste MD    sucralfate tablet 1 g, 1 g, Oral, QID (AC & HS), Pauline Kwon, ANP, 1 g at 08/13/23 0548    VTE Risk Mitigation (From admission, onward)           Ordered     IP VTE HIGH RISK PATIENT  Once         08/11/23 2053     Place sequential compression device  Until discontinued         08/11/23 2053                  Assessment:   Acute/Chronic Systolic Heart Failure  Cardiomyopathy/Left Ventricular Systolic Dysfunction (Unspecified for Now)    - EF 35% Down from EF 45-50% (A few Days Prior)    - Bilateral Pleural Effusions  Chronic Interstitial Lung Disease  Hypertension (BP Stable)  Hyperlipidemia    - Statin Intolerance  Polysubstance Abuse- Nicotine Dependence/History of Heavy Tobacco Use & Regular Alcohol Consumption (Reported Recent Cessation)\  Atrial Fibrillation (Recent New Diagnosis on 8.5.23)    - On Xarelto Outpatient    - [id number]: 4 (LVSD/HTN/Age)  Melena with Suspected Acute GI Bleed    - GI Workup in Progress  Anemia  History of Medical Noncompliance    Plan:   Continue Diuresis as Clinically Indicated. Ensure Accurate I/O & Daily Weights  Continue Beta Blockade  Anticoagulation on hold given suspected GI Bleed/GI Workup in Progress. Consider resumption of Xarelto if/when safe from GI Perspective.  If unable to resume full anticoagulation, recommend aspirin 81 Mg Daily. Watchman evaluation to be considered on outpatient basis.  Will need ischemic workup on outpatient basis- Rachael PET Re: LVSD/CAD Risk Factors.  On PPI  K+ Goal- 4, Mag Goal- 2  Optimize GDMT for CMO  Will need follow up with Dr. Cobb prior to following up with EP Services once discharged.     Thank you for your consult.     CHARLIE Medrano  Cardiology  [facility] - 8th Floor Med Surg  08/13/2023 7:36 AM

## 2023-08-13 NOTE — PROGRESS NOTES
[facility]  Hospital Medicine Progress Note        Chief Complaint: Inpatient Follow-up    HPI:   88-year-old male with significant history of PAF on Xarelto, systolic heart failure with recent echocardiogram showing ejection fraction-45-50%, HTN, HLD, medical noncompliance, heavy tobacco use .  patient was recently started on Xarelto for thromboembolic prophylaxis for AFib.  He had a recent hospitalization from 08/04 to 8/6 for acute decompensated heart failure with AFib and was discharged on diuretics, Xarelto and metoprolol.  Patient presented back to the ED with complaints of worsening shortness of breath, nonproductive cough and also melanotic stools.  cough is chronic for the past several months, but lately has been worsening.  Patient initially presented to outlying facility ED and was prescribed azithromycin, Lasix and was discharged home.  but presented back to the ED given persistent complaints.  Patient was hemodynamically stable.  Lab significant for anemia with hemoglobin-11, INR slightly elevated.  Patient initiated on IV Lasix, IV Protonix and was admitted to hospitalist medicine service.  Patient was previously heavy smoker, quit 40 years back.  GI services consulted for GI bleed.  Xarelto held.  CT chest in the ED showed bilateral pleural effusion, cardiomegaly and small pericardial effusion, possible interstitial lung disease.  Did have expiratory wheezes.  Initiated bronchodilators.  Also initiated low-dose prednisone.  Repeat echocardiogram ordered on 08/20 given concern for pericardial effusion is CT.  No pericardial effusion, but EF dropped to 35% .  Decided to consult Cardiology.    Interval Hx:     Patient seen at bedside.  Overall feeling much better.  Less dyspneic.  Hemodynamics are stable.  No new complaints . no acute events overnight.  No more having overt GI bleeding .    Objective/physical exam:  General: In no acute distress, afebrile  Chest: Clear to  auscultation bilaterally  Heart: S1, S2, no appreciable murmur  Abdomen: Soft, nontender, BS +  MSK: Warm, no lower extremity edema, no clubbing or cyanosis  Neurologic: Alert and oriented x4, moving all extremities with good strength     VITAL SIGNS: 24 HRS MIN & MAX LAST   Temp  Min: 97.5 °F (36.4 °C)  Max: 99 °F (37.2 °C) 98.1 °F (36.7 °C)   BP  Min: 111/74  Max: 129/86 111/74   Pulse  Min: 61  Max: 89  80   Resp  Min: 18  Max: 18 18   SpO2  Min: 92 %  Max: 95 % (!) 94 %       Recent Labs   Lab 08/13/23  0348   WBC 8.71   RBC 4.40*   HGB 11.9*   HCT 36.3*   MCV 82.5   MCH 27.0   MCHC 32.8*   RDW 14.9   *   MPV 8.9         Recent Labs   Lab 08/12/23  0420 08/13/23  0348   * 139   K 3.9 4.3   CO2 28 29   BUN 14.1 11.6   CREATININE 1.03 1.17   CALCIUM 8.3* 8.7*   MG 2.10  --    ALBUMIN 2.8* 3.0*   ALKPHOS 54 60   ALT 26 28   AST 19 22   BILITOT 0.6 0.6          Microbiology Results (last 7 days)       ** No results found for the last 168 hours. **             Scheduled Med:   fluticasone furoate-vilanteroL  1 puff Inhalation Daily    furosemide (LASIX) injection  40 mg Intravenous Q12H    guaiFENesin  600 mg Oral BID    metoprolol succinate  75 mg Oral Daily    pantoprazole  40 mg Intravenous Q12H    predniSONE  20 mg Oral Daily    sucralfate  1 g Oral QID (AC & HS)          Assessment/Plan:    Acute decompensated systolic heart failure with ejection fraction-35%   Bilateral pleural effusion, left more than right secondary to above   Chronic interstitial lung disease with mild acute exacerbation  Suspected acute GI bleed-unclear etiology   Mild acute blood loss anemia   Recent diagnosis AFib on Xarelto   History of heavy tobacco use  Essential HTN-stable   HLD  Medical noncompliance  Prophylaxis    Patient had recent echocardiogram which revealed ejection fraction-45%  CT chest 8/11 with concerns for pericardial effusion and therefore an echocardiogram was repeated 8/12  Drop in ejection fraction to  35%  Cardiology consulted  On IV Lasix 40 mg b.i.d.  On beta-blocker  Cardiology evaluated, planning for outpatient ischemic evaluation  GI consulted to further evaluate melanotic stools , no more episodes after hospitalization and hemoglobin is remaining stable   No indication for urgent endoscopic evaluation   GI okay with resuming anticoagulation   I have resumed Xarelto for tonight  Switch IV Protonix to p.o. Protonix b.i.d. and continue Carafate  In case of overt bleeding on Xarelto will reconsult GI  Concern for interstitial lung disease per CT, possible COPD/emphysema  Mild exacerbation noted   P.o. prednisone 20 mg daily x5 days -day 2 today  On bronchodilators  Will arrange outpatient pulmonology appointment  DVT prophylaxis-Xarelto resumed    If respiratory status continues to be stable and no more overt bleeding on Xarelto patient can possibly go home tomorrow    Kaylin Mohan MD   08/13/2023

## 2023-08-13 NOTE — PROGRESS NOTES
"Gastroenterology Progress Note    Subjective/Interval History:  8-13-23  No dark stooling overnight  Afebrile  Hgb trending up    ROS:  12 point system reviewed and is negative except as noted in HPI     Vital Signs:  /74   Pulse 80   Temp 98.1 °F (36.7 °C) (Oral)   Resp 18   Ht 5' 10" (1.778 m)   Wt 74.9 kg (165 lb 2 oz)   SpO2 (!) 94%   BMI 23.69 kg/m²   Body mass index is 23.69 kg/m².    Physical Exam:  General: Angry, frustratedm Appears comfortable, in no acute distress  HEENT: NC/AT  Neck:  No JVD  Chest: diminished BLL but otherwise CTA  CVS: irregular rhythm. Normal S1/S2. 1+ edema bilaterally  Abdomen: nondistended, normoactive BS, soft and non-tender.  MSK: No obvious deformity or joint swelling  Skin: Warm and dry, bruises everywhere  Neuro: AAOx3, no focal neurological deficit  Psych: angry and combative    Labs:  Recent Results (from the past 48 hour(s))   Comprehensive metabolic panel    Collection Time: 08/11/23 12:59 PM   Result Value Ref Range    Sodium Level 139 136 - 145 mmol/L    Potassium Level 4.4 3.5 - 5.1 mmol/L    Chloride 102 98 - 107 mmol/L    Carbon Dioxide 28 23 - 31 mmol/L    Glucose Level 161 (H) 82 - 115 mg/dL    Blood Urea Nitrogen 16.6 8.4 - 25.7 mg/dL    Creatinine 1.15 0.73 - 1.18 mg/dL    Calcium Level Total 8.6 (L) 8.8 - 10.0 mg/dL    Protein Total 6.4 5.8 - 7.6 gm/dL    Albumin Level 3.0 (L) 3.4 - 4.8 g/dL    Globulin 3.4 2.4 - 3.5 gm/dL    Albumin/Globulin Ratio 0.9 (L) 1.1 - 2.0 ratio    Bilirubin Total 0.6 <=1.5 mg/dL    Alkaline Phosphatase 58 40 - 150 unit/L    Alanine Aminotransferase 27 0 - 55 unit/L    Aspartate Aminotransferase 20 5 - 34 unit/L    eGFR >60 mls/min/1.73/m2   CBC with Differential    Collection Time: 08/11/23 12:59 PM   Result Value Ref Range    WBC 7.87 4.50 - 11.50 x10(3)/mcL    RBC 4.23 (L) 4.70 - 6.10 x10(6)/mcL    Hgb 11.3 (L) 14.0 - 18.0 g/dL    Hct 35.0 (L) 42.0 - 52.0 %    MCV 82.7 80.0 - 94.0 fL    MCH 26.7 (L) 27.0 - 31.0 pg    " MCHC 32.3 (L) 33.0 - 36.0 g/dL    RDW 15.1 11.5 - 17.0 %    Platelet 392 130 - 400 x10(3)/mcL    MPV 9.2 7.4 - 10.4 fL    Neut % 71.1 %    Lymph % 17.7 %    Mono % 8.9 %    Eos % 1.4 %    Basophil % 0.4 %    Lymph # 1.39 0.6 - 4.6 x10(3)/mcL    Neut # 5.60 2.1 - 9.2 x10(3)/mcL    Mono # 0.70 0.1 - 1.3 x10(3)/mcL    Eos # 0.11 0 - 0.9 x10(3)/mcL    Baso # 0.03 <=0.2 x10(3)/mcL    IG# 0.04 0 - 0.04 x10(3)/mcL    IG% 0.5 %    NRBC% 0.0 %   Troponin I    Collection Time: 08/11/23 12:59 PM   Result Value Ref Range    Troponin-I 0.017 0.000 - 0.045 ng/mL   Brain natriuretic peptide    Collection Time: 08/11/23 12:59 PM   Result Value Ref Range    Natriuretic Peptide 366.6 (H) <=100.0 pg/mL   Ferritin    Collection Time: 08/11/23 12:59 PM   Result Value Ref Range    Ferritin Level 370.93 (H) 21.81 - 274.66 ng/mL   Iron and TIBC    Collection Time: 08/11/23 12:59 PM   Result Value Ref Range    Iron Binding Capacity Unsaturated 243 (H) 69 - 240 ug/dL    Iron Level 23 (L) 65 - 175 ug/dL    Transferrin 248 mg/dL    Iron Binding Capacity Total 266 250 - 450 ug/dL    Iron Saturation 9 (L) 20 - 50 %   Vitamin B12    Collection Time: 08/11/23 12:59 PM   Result Value Ref Range    Vitamin B12 Level 290 213 - 816 pg/mL   Protime-INR    Collection Time: 08/11/23  1:33 PM   Result Value Ref Range    PT 38.0 (H) 12.5 - 14.5 seconds    INR 4.0 (H) <=1.3   Urinalysis, Reflex to Urine Culture    Collection Time: 08/11/23  3:39 PM    Specimen: Urine   Result Value Ref Range    Color, UA Yellow Yellow, Light-Yellow, Dark Yellow, Viki, Straw    Appearance, UA Clear Clear    Specific Gravity, UA 1.008 1.005 - 1.030    pH, UA 6.5 5.0 - 8.5    Protein, UA Negative Negative    Glucose, UA Negative Negative, Normal    Ketones, UA Negative Negative    Blood, UA Negative Negative    Bilirubin, UA Negative Negative    Urobilinogen, UA 1.0 0.2, 1.0, Normal    Nitrites, UA Negative Negative    Leukocyte Esterase, UA Negative Negative   Urinalysis,  Microscopic    Collection Time: 08/11/23  3:39 PM   Result Value Ref Range    RBC, UA <5 <=5 /HPF    WBC, UA <5 <=5 /HPF    Squamous Epithelial Cells, UA <5 <=5 /HPF    Bacteria, UA None Seen None Seen, Rare, Occasional /HPF   Hemoglobin and Hematocrit    Collection Time: 08/11/23 10:11 PM   Result Value Ref Range    Hgb 11.5 (L) 14.0 - 18.0 g/dL    Hct 35.6 (L) 42.0 - 52.0 %   Comprehensive Metabolic Panel    Collection Time: 08/12/23  4:20 AM   Result Value Ref Range    Sodium Level 135 (L) 136 - 145 mmol/L    Potassium Level 3.9 3.5 - 5.1 mmol/L    Chloride 99 98 - 107 mmol/L    Carbon Dioxide 28 23 - 31 mmol/L    Glucose Level 105 82 - 115 mg/dL    Blood Urea Nitrogen 14.1 8.4 - 25.7 mg/dL    Creatinine 1.03 0.73 - 1.18 mg/dL    Calcium Level Total 8.3 (L) 8.8 - 10.0 mg/dL    Protein Total 5.9 5.8 - 7.6 gm/dL    Albumin Level 2.8 (L) 3.4 - 4.8 g/dL    Globulin 3.1 2.4 - 3.5 gm/dL    Albumin/Globulin Ratio 0.9 (L) 1.1 - 2.0 ratio    Bilirubin Total 0.6 <=1.5 mg/dL    Alkaline Phosphatase 54 40 - 150 unit/L    Alanine Aminotransferase 26 0 - 55 unit/L    Aspartate Aminotransferase 19 5 - 34 unit/L    eGFR >60 mls/min/1.73/m2   Magnesium    Collection Time: 08/12/23  4:20 AM   Result Value Ref Range    Magnesium Level 2.10 1.60 - 2.60 mg/dL   Protime-INR    Collection Time: 08/12/23  4:20 AM   Result Value Ref Range    PT 20.7 (H) 12.5 - 14.5 seconds    INR 1.8 (H) <=1.3   Folate    Collection Time: 08/12/23  4:20 AM   Result Value Ref Range    Folate Level 19.1 7.0 - 31.4 ng/mL   Hemoglobin A1C    Collection Time: 08/12/23  4:20 AM   Result Value Ref Range    Hemoglobin A1c 6.6 <=7.0 %    Estimated Average Glucose 142.7 mg/dL   CBC with Differential    Collection Time: 08/12/23  4:20 AM   Result Value Ref Range    WBC 7.57 4.50 - 11.50 x10(3)/mcL    RBC 3.92 (L) 4.70 - 6.10 x10(6)/mcL    Hgb 10.7 (L) 14.0 - 18.0 g/dL    Hct 33.0 (L) 42.0 - 52.0 %    MCV 84.2 80.0 - 94.0 fL    MCH 27.3 27.0 - 31.0 pg    MCHC 32.4  (L) 33.0 - 36.0 g/dL    RDW 15.1 11.5 - 17.0 %    Platelet 364 130 - 400 x10(3)/mcL    MPV 8.8 7.4 - 10.4 fL    Neut % 65.9 %    Lymph % 20.3 %    Mono % 10.6 %    Eos % 2.4 %    Basophil % 0.4 %    Lymph # 1.54 0.6 - 4.6 x10(3)/mcL    Neut # 4.99 2.1 - 9.2 x10(3)/mcL    Mono # 0.80 0.1 - 1.3 x10(3)/mcL    Eos # 0.18 0 - 0.9 x10(3)/mcL    Baso # 0.03 <=0.2 x10(3)/mcL    IG# 0.03 0 - 0.04 x10(3)/mcL    IG% 0.4 %    NRBC% 0.0 %   Echo    Collection Time: 08/12/23 10:58 AM   Result Value Ref Range    BSA 1.98 m2    LVIDd 4.60 3.5 - 6.0 cm    LV Systolic Volume 63.10 mL    LV Systolic Volume Index 32.0 mL/m2    LVIDs 3.83 2.1 - 4.0 cm    LV Diastolic Volume 97.30 mL    LV Diastolic Volume Index 49.39 mL/m2    IVS 1.18 (A) 0.6 - 1.1 cm    FS 17 (A) 28 - 44 %    Left Ventricle Relative Wall Thickness 0.57 cm    Posterior Wall 1.32 (A) 0.6 - 1.1 cm    LV mass 217.40 g    LV Mass Index 110 g/m2    MV Peak E Tyree 0.70 m/s    MV Peak A Tyree 0.28 m/s    TR Max Tyree 2.34 m/s    E/A ratio 2.50     E wave deceleration time 201.00 msec    LVOT peak tyree 0.85 m/s    Left Ventricular Outflow Tract Mean Velocity 0.52 cm/s    Left Ventricular Outflow Tract Mean Gradient 1.00 mmHg    LA volume (mod) 133.00 cm3    LA Volume Index (Mod) 67.5 mL/m2    TAPSE 1.07 cm    AV mean gradient 3 mmHg    AV peak gradient 6 mmHg    Ao peak tyree 1.21 m/s    Ao VTI 19.80 cm    LVOT peak VTI 16.30 cm    AV Velocity Ratio 0.70     AV index (prosthetic) 0.82     Triscuspid Valve Regurgitation Peak Gradient 22 mmHg    ZLVIDS 0.76     ZLVIDD -2.08     EF 35 %   Comprehensive Metabolic Panel    Collection Time: 08/13/23  3:48 AM   Result Value Ref Range    Sodium Level 139 136 - 145 mmol/L    Potassium Level 4.3 3.5 - 5.1 mmol/L    Chloride 98 98 - 107 mmol/L    Carbon Dioxide 29 23 - 31 mmol/L    Glucose Level 111 82 - 115 mg/dL    Blood Urea Nitrogen 11.6 8.4 - 25.7 mg/dL    Creatinine 1.17 0.73 - 1.18 mg/dL    Calcium Level Total 8.7 (L) 8.8 - 10.0 mg/dL     Protein Total 6.4 5.8 - 7.6 gm/dL    Albumin Level 3.0 (L) 3.4 - 4.8 g/dL    Globulin 3.4 2.4 - 3.5 gm/dL    Albumin/Globulin Ratio 0.9 (L) 1.1 - 2.0 ratio    Bilirubin Total 0.6 <=1.5 mg/dL    Alkaline Phosphatase 60 40 - 150 unit/L    Alanine Aminotransferase 28 0 - 55 unit/L    Aspartate Aminotransferase 22 5 - 34 unit/L    eGFR >60 mls/min/1.73/m2   CBC with Differential    Collection Time: 08/13/23  3:48 AM   Result Value Ref Range    WBC 8.71 4.50 - 11.50 x10(3)/mcL    RBC 4.40 (L) 4.70 - 6.10 x10(6)/mcL    Hgb 11.9 (L) 14.0 - 18.0 g/dL    Hct 36.3 (L) 42.0 - 52.0 %    MCV 82.5 80.0 - 94.0 fL    MCH 27.0 27.0 - 31.0 pg    MCHC 32.8 (L) 33.0 - 36.0 g/dL    RDW 14.9 11.5 - 17.0 %    Platelet 438 (H) 130 - 400 x10(3)/mcL    MPV 8.9 7.4 - 10.4 fL    Neut % 65.4 %    Lymph % 21.2 %    Mono % 10.2 %    Eos % 2.3 %    Basophil % 0.6 %    Lymph # 1.85 0.6 - 4.6 x10(3)/mcL    Neut # 5.69 2.1 - 9.2 x10(3)/mcL    Mono # 0.89 0.1 - 1.3 x10(3)/mcL    Eos # 0.20 0 - 0.9 x10(3)/mcL    Baso # 0.05 <=0.2 x10(3)/mcL    IG# 0.03 0 - 0.04 x10(3)/mcL    IG% 0.3 %    NRBC% 0.0 %       Imaging:  Echo    Result Date: 8/12/2023    Left Ventricle: The left ventricle is normal in size. Mildly increased wall thickness. Moderate global hypokinesis present. There is moderately reduced systolic function. Ejection fraction by visual approximation is 35%.   Left Atrium: Left atrium is severely dilated.   Right Ventricle: Mild right ventricular enlargement. Systolic function is mildly reduced.   Right Atrium: Right atrium is moderately dilated.   Aortic Valve: There is mild aortic valve sclerosis. There is mild aortic regurgitation.   Mitral Valve: There is no stenosis. There is mild regurgitation.   Tricuspid Valve: There is mild regurgitation.   Pulmonic Valve: There is no significant stenosis. There is mild regurgitation.   Pericardium: Left pleural effusion.     CT Chest W Wo Contrast    Result Date: 8/12/2023  EXAMINATION CT CHEST W WO  CONTRAST CLINICAL HISTORY LLL opacity; heavy tobacco use + weight loss; TECHNIQUE Pre- and post-contrast helical-acquisition CT images were obtained and multiplanar reformats accomplished by a CT technologist at a separate workstation, pushed to PACS for physician review. CONTRAST IV: ISOVUE-370, 100 mL COMPARISON None available at the time of initial evaluation FINDINGS Images were reviewed in lung, soft tissue, and bone windows. Exam quality: adequate for evaluation Lines/tubes: none visualized Global dilatation of the heart chambers is noted.  There is a small volume pericardial effusion.  Scattered coronary and aortic calcification present.  No aneurysmal dilatation of the aorta is identified.  There is no focal vessel contour irregularity.  No intraluminal filling defect of the central pulmonary arteries. Central airway patency is widely maintained.  Extensive bilateral chronic lung parenchymal changes are present, with suspected dependent left basilar atelectasis.  No organized lobar consolidation is identified.  There is no aggressive appearing mass-like focal lung lesion.  Small volume layering bilateral pleural effusions are noted, with no associated pleural thickening or loculated fluid component.  There is no pneumothorax. No pathologic lymph node enlargement or necrotic adenopathy is evident.  Scattered nonspecific calcified mediastinal lymph nodes are incidentally noted. The visualized thyroid gland is unremarkable.  Included upper abdominal structures are without evidence of acute or suspicious focal abnormality.  Multiple punctate calcifications are scattered throughout the spleen, suggestive of remote granulomatous process. There are degenerative alterations throughout the bony thorax, with no evidence of acute or destructive skeletal process.  Visualized subcutaneous tissues and regional musculature are unremarkable. IMPRESSION 1. Widespread chronic lung parenchymal changes without evidence of acute  pulmonary process. 2. Small volume pericardial and bilateral pleural effusions. 3. Chronic sequela of prior granulomatous disease. 4. Additional chronic secondary details discussed above. ========== No significant discrepancy identified in relation to the teleradiology preliminary report. RADIATION DOSE Automated tube current modulation, weight-based exposure dosing, and/or iterative reconstruction technique utilized to reach lowest reasonably achievable exposure rate. DLP:  384 mGy*cm Electronically signed by: Bruce Roque Date:    08/12/2023 Time:    13:31    X-Ray Chest AP Portable    Result Date: 8/11/2023  EXAMINATION: XR CHEST AP PORTABLE CLINICAL HISTORY: Shortness of breath TECHNIQUE: Single view of the chest COMPARISON: 08/10/2023 FINDINGS: No focal opacification.  Left-sided pleural effusion similar to prior.  Cardiomegaly is unchanged.  No pneumothorax.     No adverse interval change.  Left-sided pleural effusion remains. Electronically signed by: Linwood Loera Date:    08/11/2023 Time:    15:17    X-Ray Chest 1 View    Result Date: 8/10/2023  EXAMINATION: XR CHEST 1 VIEW CLINICAL HISTORY: shortness of breath; TECHNIQUE: Frontal view(s) of the chest. COMPARISON: Radiography 08/04/2023 FINDINGS: Similar cardiomegaly and pulmonary vascular congestion.  More confluent opacity in the medial left lung base.  No significant pleural fluid or pneumothorax.     Similar cardiomegaly and pulmonary vascular congestion. There continues to be suspected opacity in the left lung base, atelectasis, infiltrate or mild pulmonary edema. Electronically signed by: Ricardo Abbott Date:    08/10/2023 Time:    14:30    Echo    Result Date: 8/5/2023    Left Ventricle: The left ventricle is mildly dilated. Normal wall thickness. Normal wall motion. There is mildly reduced systolic function with a visually estimated ejection fraction of 45 - 50%. Unable to assess diastolic function due to arrhythmia. Elevated left ventricular  filling pressure.   Left Atrium: Left atrium is severely dilated.   Right Ventricle: Mild right ventricular enlargement. Systolic function is normal.   Right Atrium: Right atrium is severely dilated.   Aortic Valve: The aortic valve is a trileaflet valve. Mildly restricted motion.   Mitral Valve: Moderately thickened leaflets. There is mild regurgitation.   Pulmonic Valve: There is mild regurgitation.   Pericardium: Small circumferential pericardial effusion present. No indication of cardiac tamponade.     X-Ray Chest AP Portable    Result Date: 8/4/2023  EXAMINATION: XR CHEST AP PORTABLE CLINICAL HISTORY: Chest Pain; TECHNIQUE: Single frontal view of the chest was performed. COMPARISON: August 3, 2023 FINDINGS: Examination reveals cardiomediastinal silhouette and pleuroparenchymal changes to be essentially unchanged as compared with the previous exam     No significant change Electronically signed by: Mundo Watts Date:    08/04/2023 Time:    09:46    X-ray Chest AP Portable    Result Date: 8/3/2023  EXAMINATION: XR CHEST AP PORTABLE CLINICAL HISTORY: Shortness of breath;, . COMPARISON: None available FINDINGS: An AP view or more reveals the heart to be enlarged.  The trachea is midline.  Mild hazy interstitial opacities evident the lungs bilaterally diffusely throughout.  The left hemidiaphragm is obscured.  Degenerative changes are evident at the thoracic spine.  Atherosclerosis is seen within the aorta.     1. Cardiomegaly 2. Obscured left hemidiaphragm suspicious for left basilar infiltrate and/or pleural reaction 3. Hazy interstitial opacities throughout the lungs bilaterally which may represent interstitial pulmonary edema versus chronic interstitial changes.  Clinical correlation is indicated 4. Atherosclerosis Electronically signed by: Ceasar Pang Date:    08/03/2023 Time:    14:13         Assessment/Plan:  Assessment/Plan:  Melena  HGB stable 11.7--11.5--10.7--11.9    New Afib  On Xaralto  Acute  COPD exacerbation  Acute CHF exacerbation    Continue BID PPI x 6 weeks then daily  Carafate 1gm ac/hs x 2 weeks    No further GI recs  No indication for EGD      Pauline Kwon NP as scribe for Dr. Presley Benjamin

## 2023-08-13 NOTE — CONSULTS
Inpatient Nutrition Assessment    Admit Date: 8/11/2023   Total duration of encounter: 2 days     Nutrition Recommendation/Prescription     Continue current diet order as tolerated    Add vanilla & strawberry Boost VHC BID. 530 kcals and 22 g protein per serving.     Monitor po intake, weight, labs.     Communication of Recommendations: reviewed with nurse and reviewed with patient    Nutrition Assessment     Malnutrition Assessment/Nutrition-Focused Physical Exam    Malnutrition Context: chronic illness  Malnutrition Level: moderate  Energy Intake (Malnutrition): less than or equal to 75% for greater than or equal to 1 month  Weight Loss (Malnutrition): 20% in 1 year  Subcutaneous Fat (Malnutrition): mild depletion  Orbital Region (Subcutaneous Fat Loss): mild depletion  Upper Arm Region (Subcutaneous Fat Loss): mild depletion     Muscle Mass (Malnutrition): moderate depletion  [place] Region (Muscle Loss): mild depletion     Clavicle and Acromion Bone Region (Muscle Loss): moderate depletion     Dorsal Hand (Muscle Loss): mild depletion  Patellar Region (Muscle Loss): moderate depletion     Posterior Calf Region (Muscle Loss): moderate depletion           A minimum of two characteristics is recommended for diagnosis of either severe or non-severe malnutrition.    Chart Review    Reason Seen: physician consult for weight loss    Malnutrition Screening Tool Results   Have you recently lost weight without trying?: Yes: 2-13 lbs  Have you been eating poorly because of a decreased appetite?: No   MST Score: 1     Diagnosis:  Melena, concerning for upper GI bleed in setting of recent OAC intiation  Acute on chronic normocytic anemia  Coagulopathy  Decompensated systolic HF (LVEF 45-50%)  Chronic Cough with concerns for possible CAP- LLL  Afib, CVR on Xarelto  Essential HTN  ETOH Use      Relevant Medical History:   Atrial fibrillation, on Xarelto  Systolic heart failure (LVEF 45-50% on 08/04/2023)  Essential  hypertension  Hyperlipidemia/statin allergy  History of medical noncompliance  Former heavy tobacco use    Past Surgical History:   Procedure Laterality Date    FRACTURE SURGERY      TIBIA FRACTURE SURGERY Right      Review of patient's allergies indicates:   Allergen Reactions    Colestipol     Meloxicam     Rosuvastatin     Simvastatin     Statins-hmg-coa reductase inhibitors     Tramadol         Nutrition-Related Medications:    fluticasone furoate-vilanteroL  1 puff Inhalation Daily    furosemide (LASIX) injection  40 mg Intravenous Q12H    guaiFENesin  600 mg Oral BID    metoprolol succinate  75 mg Oral Daily    pantoprazole  40 mg Oral BID AC    predniSONE  20 mg Oral Daily    rivaroxaban  20 mg Oral Daily with dinner    sucralfate  1 g Oral QID (AC & HS)         acetaminophen, acetaminophen, aluminum-magnesium hydroxide-simethicone, guaiFENesin 100 mg/5 ml, levalbuterol, melatonin, metoprolol, ondansetron, polyethylene glycol, prochlorperazine, senna-docusate 8.6-50 mg, sodium chloride 0.9%   Calorie Containing IV Medications: no significant kcals from medications at this time    Nutrition-Related Labs:  Hemoglobin A1c   Date Value Ref Range Status   08/12/2023 6.6 <=7.0 % Final     8/12/2023: Magnesium Level 2.10 mg/dL (Ref range: 1.60 - 2.60 mg/dL)  8/13/2023: Potassium Level 4.3 mmol/L (Ref range: 3.5 - 5.1 mmol/L); Sodium Level 139 mmol/L (Ref range: 136 - 145 mmol/L)   Recent Labs   Lab 08/11/23  1259 08/11/23  2211 08/12/23  0420 08/13/23  0348   WBC 7.87  --  7.57 8.71   RBC 4.23*  --  3.92* 4.40*   HGB 11.3* 11.5* 10.7* 11.9*   HCT 35.0* 35.6* 33.0* 36.3*   MCV 82.7  --  84.2 82.5   MCH 26.7*  --  27.3 27.0   MCHC 32.3*  --  32.4* 32.8*     Recent Labs   Lab 08/11/23  1259 08/12/23  0420 08/13/23  0348    135* 139   K 4.4 3.9 4.3   CO2 28 28 29   BUN 16.6 14.1 11.6   CREATININE 1.15 1.03 1.17   CALCIUM 8.6* 8.3* 8.7*   MG  --  2.10  --    ALBUMIN 3.0* 2.8* 3.0*   ALKPHOS 58 54 60   ALT 27 26 28  "  AST 20 19 22   BILITOT 0.6 0.6 0.6       Diet/PN Order: Diet heart healthy  Oral Supplement Order: none  Tube Feeding Order: none  Appetite/Oral Intake: good/% of meals  Factors Affecting Nutritional Intake: none identified  Food/Gnosticism/Cultural Preferences: none reported  Food Allergies: none reported       Wound(s):   n/a    Comments    23: Pt reports good appetite now that he is on solid food, disliked liquids, chews/swallows well, no n/v/d, no bm x 3 days - abnormal for pt, lost a lot of weight in November of last year due to illness, appetite has not been the same since and he has not been able to gain the weight back. Prior to that, UBW was 195 lbs, standing scale today showed 157 lbs, pt said standing scale at home showed 140 lbs - could be due to fluid, pt w/ some fluid retention and is on lasix. PO intake encouraged, pt agrees to vanilla or strawberry ONS.     Anthropometrics    Height: 5' 10" (177.8 cm)    Last Weight: 71.2 kg (157 lb) (23 1252) Weight Method: Standard Scale  BMI (Calculated): 22.5  BMI Classification: normal (BMI 18.5-24.9)        Ideal Body Weight (IBW), Male: 166 lb     % Ideal Body Weight, Male (lb): 105.42 %                          Usual Weight Provided By: patient and family/caregiver  : 195 lbs  Wt Readings from Last 5 Encounters:   23 71.2 kg (157 lb)   08/10/23 79.4 kg (175 lb)   23 79.4 kg (175 lb 0.7 oz)   23 77.1 kg (170 lb)   23 72.6 kg (160 lb)     Weight Change(s) Since Admission:  Admit Weight: 79.4 kg (175 lb) (23 1235)  23: 157 lbs standing scale RD     Estimated Needs    Weight Used For Calorie Calculations: 71.2 kg (156 lb 15.5 oz)  Energy Calorie Requirements (kcal): 8382-4778 kcals (25-30 kcals/kg)  Energy Need Method: Kcal/kg  Weight Used For Protein Calculations: 71.2 kg (156 lb 15.5 oz)  Protein Requirements: 107 g (1.5 g/kg)  Fluid Requirements (mL): 6252-6502 mL (1 mL/kcal)  Temp (24hrs), Av.3 °F " (36.8 °C), Min:97.8 °F (36.6 °C), Max:99 °F (37.2 °C)       Enteral Nutrition    Patient not receiving enteral nutrition at this time.    Parenteral Nutrition    Patient not receiving parenteral nutrition support at this time.    Evaluation of Received Nutrient Intake    Calories: not meeting estimated needs   Protein: not meeting estimated needs    Patient Education    Education Provided: high calorie/high protein diet  Teaching Method: explanation  Comprehension: verbalizes understanding  Barriers to Learning: none evident  Expected Compliance: good  Comments: All questions were answered and dietitian's contact information was provided.     Nutrition Diagnosis     PES: Malnutrition related to catabolic illness and suboptimal protein/energy intake as evidenced by mild fat depletion, moderate muscle depletion, and 20% weight loss in 1 year 75% po intake for 1 month or greater, in the context of COPD. (new)     Interventions/Goals     Intervention(s): general/healthful diet and commercial beverage  Goal: Meet greater than 75% of nutritional needs by follow-up. (new)    Monitoring & Evaluation     Dietitian will monitor food and beverage intake, weight, and electrolyte/renal panel.  Nutrition Risk/Follow-Up: moderate (follow-up in 3-5 days)   Please consult if re-assessment needed sooner.    Dinh Simms, SHIRLEY   08/13/2023

## 2023-08-13 NOTE — PLAN OF CARE
Problem: Adult Inpatient Plan of Care  Goal: Plan of Care Review  Outcome: Ongoing, Progressing  Goal: Patient-Specific Goal (Individualized)  Outcome: Ongoing, Progressing  Goal: Absence of Hospital-Acquired Illness or Injury  Outcome: Ongoing, Progressing  Intervention: Prevent Skin Injury  Flowsheets (Taken 8/13/2023 8210)  Body Position: position changed independently  Skin Protection:   adhesive use limited   tubing/devices free from skin contact  Goal: Optimal Comfort and Wellbeing  Outcome: Ongoing, Progressing  Goal: Readiness for Transition of Care  Outcome: Ongoing, Progressing     Problem: Fall Injury Risk  Goal: Absence of Fall and Fall-Related Injury  Outcome: Ongoing, Progressing

## 2023-08-14 LAB
ALBUMIN SERPL-MCNC: 2.8 G/DL (ref 3.4–4.8)
ALBUMIN/GLOB SERPL: 0.9 RATIO (ref 1.1–2)
ALP SERPL-CCNC: 56 UNIT/L (ref 40–150)
ALT SERPL-CCNC: 22 UNIT/L (ref 0–55)
AST SERPL-CCNC: 17 UNIT/L (ref 5–34)
BASOPHILS # BLD AUTO: 0.03 X10(3)/MCL
BASOPHILS NFR BLD AUTO: 0.3 %
BILIRUB SERPL-MCNC: 0.4 MG/DL
BUN SERPL-MCNC: 20.1 MG/DL (ref 8.4–25.7)
CALCIUM SERPL-MCNC: 8.5 MG/DL (ref 8.8–10)
CHLORIDE SERPL-SCNC: 99 MMOL/L (ref 98–107)
CO2 SERPL-SCNC: 28 MMOL/L (ref 23–31)
CREAT SERPL-MCNC: 1.17 MG/DL (ref 0.73–1.18)
EOSINOPHIL # BLD AUTO: 0.13 X10(3)/MCL (ref 0–0.9)
EOSINOPHIL NFR BLD AUTO: 1.1 %
ERYTHROCYTE [DISTWIDTH] IN BLOOD BY AUTOMATED COUNT: 15 % (ref 11.5–17)
GFR SERPLBLD CREATININE-BSD FMLA CKD-EPI: >60 MLS/MIN/1.73/M2
GLOBULIN SER-MCNC: 3.2 GM/DL (ref 2.4–3.5)
GLUCOSE SERPL-MCNC: 137 MG/DL (ref 82–115)
HCT VFR BLD AUTO: 32.2 % (ref 42–52)
HCT VFR BLD AUTO: 33.2 % (ref 42–52)
HGB BLD-MCNC: 10.7 G/DL (ref 14–18)
HGB BLD-MCNC: 10.9 G/DL (ref 14–18)
IMM GRANULOCYTES # BLD AUTO: 0.05 X10(3)/MCL (ref 0–0.04)
IMM GRANULOCYTES NFR BLD AUTO: 0.4 %
LYMPHOCYTES # BLD AUTO: 1.96 X10(3)/MCL (ref 0.6–4.6)
LYMPHOCYTES NFR BLD AUTO: 17.1 %
MCH RBC QN AUTO: 27.3 PG (ref 27–31)
MCHC RBC AUTO-ENTMCNC: 33.2 G/DL (ref 33–36)
MCV RBC AUTO: 82.1 FL (ref 80–94)
MONOCYTES # BLD AUTO: 1.02 X10(3)/MCL (ref 0.1–1.3)
MONOCYTES NFR BLD AUTO: 8.9 %
NEUTROPHILS # BLD AUTO: 8.24 X10(3)/MCL (ref 2.1–9.2)
NEUTROPHILS NFR BLD AUTO: 72.2 %
NRBC BLD AUTO-RTO: 0 %
PLATELET # BLD AUTO: 434 X10(3)/MCL (ref 130–400)
PMV BLD AUTO: 9.2 FL (ref 7.4–10.4)
POTASSIUM SERPL-SCNC: 4 MMOL/L (ref 3.5–5.1)
PROT SERPL-MCNC: 6 GM/DL (ref 5.8–7.6)
RBC # BLD AUTO: 3.92 X10(6)/MCL (ref 4.7–6.1)
SODIUM SERPL-SCNC: 137 MMOL/L (ref 136–145)
WBC # SPEC AUTO: 11.43 X10(3)/MCL (ref 4.5–11.5)

## 2023-08-14 PROCEDURE — 25000242 PHARM REV CODE 250 ALT 637 W/ HCPCS: Performed by: NURSE PRACTITIONER

## 2023-08-14 PROCEDURE — 85025 COMPLETE CBC W/AUTO DIFF WBC: CPT | Performed by: INTERNAL MEDICINE

## 2023-08-14 PROCEDURE — 25000003 PHARM REV CODE 250: Performed by: INTERNAL MEDICINE

## 2023-08-14 PROCEDURE — 25000003 PHARM REV CODE 250: Performed by: NURSE PRACTITIONER

## 2023-08-14 PROCEDURE — 85014 HEMATOCRIT: CPT | Performed by: INTERNAL MEDICINE

## 2023-08-14 PROCEDURE — 63600175 PHARM REV CODE 636 W HCPCS: Performed by: INTERNAL MEDICINE

## 2023-08-14 PROCEDURE — 21400001 HC TELEMETRY ROOM

## 2023-08-14 PROCEDURE — 80053 COMPREHEN METABOLIC PANEL: CPT | Performed by: INTERNAL MEDICINE

## 2023-08-14 RX ORDER — FLUTICASONE FUROATE AND VILANTEROL 100; 25 UG/1; UG/1
1 POWDER RESPIRATORY (INHALATION) DAILY
Qty: 30 EACH | Refills: 0 | Status: SHIPPED | OUTPATIENT
Start: 2023-08-14 | End: 2023-08-14 | Stop reason: SDUPTHER

## 2023-08-14 RX ORDER — GUAIFENESIN 100 MG/5ML
200 SOLUTION ORAL 4 TIMES DAILY PRN
Qty: 100 ML | Refills: 0 | Status: SHIPPED | OUTPATIENT
Start: 2023-08-14 | End: 2023-08-14 | Stop reason: SDUPTHER

## 2023-08-14 RX ORDER — FUROSEMIDE 40 MG/1
40 TABLET ORAL 2 TIMES DAILY
Qty: 60 TABLET | Refills: 0 | Status: SHIPPED | OUTPATIENT
Start: 2023-08-14 | End: 2023-08-14 | Stop reason: SDUPTHER

## 2023-08-14 RX ORDER — PANTOPRAZOLE SODIUM 40 MG/1
40 TABLET, DELAYED RELEASE ORAL
Qty: 60 TABLET | Refills: 0 | Status: SHIPPED | OUTPATIENT
Start: 2023-08-14 | End: 2023-08-14 | Stop reason: SDUPTHER

## 2023-08-14 RX ORDER — PREDNISONE 20 MG/1
20 TABLET ORAL DAILY
Qty: 3 TABLET | Refills: 0 | Status: SHIPPED | OUTPATIENT
Start: 2023-08-14 | End: 2023-08-17

## 2023-08-14 RX ORDER — GUAIFENESIN 100 MG/5ML
200 SOLUTION ORAL 4 TIMES DAILY PRN
Qty: 100 ML | Refills: 0 | Status: ON HOLD | OUTPATIENT
Start: 2023-08-14 | End: 2023-08-26 | Stop reason: HOSPADM

## 2023-08-14 RX ORDER — FUROSEMIDE 40 MG/1
40 TABLET ORAL 2 TIMES DAILY
Qty: 60 TABLET | Refills: 0 | Status: SHIPPED | OUTPATIENT
Start: 2023-08-14 | End: 2023-08-18 | Stop reason: HOSPADM

## 2023-08-14 RX ORDER — VALSARTAN 40 MG/1
40 TABLET ORAL DAILY
Status: DISCONTINUED | OUTPATIENT
Start: 2023-08-14 | End: 2023-08-18 | Stop reason: HOSPADM

## 2023-08-14 RX ORDER — SUCRALFATE 1 G/1
1 TABLET ORAL
Qty: 120 TABLET | Refills: 0 | Status: SHIPPED | OUTPATIENT
Start: 2023-08-14 | End: 2023-08-14 | Stop reason: SDUPTHER

## 2023-08-14 RX ORDER — PANTOPRAZOLE SODIUM 40 MG/1
40 TABLET, DELAYED RELEASE ORAL
Qty: 60 TABLET | Refills: 0 | Status: SHIPPED | OUTPATIENT
Start: 2023-08-14 | End: 2023-09-13

## 2023-08-14 RX ORDER — NAPROXEN SODIUM 220 MG/1
81 TABLET, FILM COATED ORAL DAILY
Qty: 30 TABLET | Refills: 0 | Status: SHIPPED | OUTPATIENT
Start: 2023-08-14 | End: 2024-03-07

## 2023-08-14 RX ORDER — FLUTICASONE FUROATE AND VILANTEROL 100; 25 UG/1; UG/1
1 POWDER RESPIRATORY (INHALATION) DAILY
Qty: 30 EACH | Refills: 0 | Status: SHIPPED | OUTPATIENT
Start: 2023-08-14 | End: 2023-09-13

## 2023-08-14 RX ORDER — VALSARTAN 40 MG/1
40 TABLET ORAL DAILY
Qty: 90 TABLET | Refills: 3 | Status: SHIPPED | OUTPATIENT
Start: 2023-08-14 | End: 2024-08-13

## 2023-08-14 RX ORDER — PREDNISONE 20 MG/1
20 TABLET ORAL DAILY
Qty: 3 TABLET | Refills: 0 | Status: SHIPPED | OUTPATIENT
Start: 2023-08-14 | End: 2023-08-14 | Stop reason: SDUPTHER

## 2023-08-14 RX ORDER — SUCRALFATE 1 G/1
1 TABLET ORAL
Qty: 120 TABLET | Refills: 0 | Status: SHIPPED | OUTPATIENT
Start: 2023-08-14 | End: 2023-09-13

## 2023-08-14 RX ORDER — NAPROXEN SODIUM 220 MG/1
81 TABLET, FILM COATED ORAL DAILY
Qty: 30 TABLET | Refills: 0 | Status: SHIPPED | OUTPATIENT
Start: 2023-08-14 | End: 2023-08-14 | Stop reason: SDUPTHER

## 2023-08-14 RX ORDER — NAPROXEN SODIUM 220 MG/1
81 TABLET, FILM COATED ORAL DAILY
Status: DISCONTINUED | OUTPATIENT
Start: 2023-08-14 | End: 2023-08-15

## 2023-08-14 RX ADMIN — FUROSEMIDE 40 MG: 10 INJECTION, SOLUTION INTRAMUSCULAR; INTRAVENOUS at 05:08

## 2023-08-14 RX ADMIN — PREDNISONE 20 MG: 20 TABLET ORAL at 09:08

## 2023-08-14 RX ADMIN — SUCRALFATE 1 G: 1 TABLET ORAL at 05:08

## 2023-08-14 RX ADMIN — SUCRALFATE 1 G: 1 TABLET ORAL at 12:08

## 2023-08-14 RX ADMIN — ASPIRIN 81 MG CHEWABLE TABLET 81 MG: 81 TABLET CHEWABLE at 09:08

## 2023-08-14 RX ADMIN — GUAIFENESIN 600 MG: 600 TABLET, EXTENDED RELEASE ORAL at 09:08

## 2023-08-14 RX ADMIN — PANTOPRAZOLE SODIUM 40 MG: 40 TABLET, DELAYED RELEASE ORAL at 05:08

## 2023-08-14 RX ADMIN — SUCRALFATE 1 G: 1 TABLET ORAL at 09:08

## 2023-08-14 RX ADMIN — FLUTICASONE FUROATE AND VILANTEROL TRIFENATATE 1 PUFF: 100; 25 POWDER RESPIRATORY (INHALATION) at 09:08

## 2023-08-14 RX ADMIN — METOPROLOL SUCCINATE 75 MG: 50 TABLET, EXTENDED RELEASE ORAL at 09:08

## 2023-08-14 RX ADMIN — VALSARTAN 40 MG: 40 TABLET, FILM COATED ORAL at 12:08

## 2023-08-14 RX ADMIN — RIVAROXABAN 20 MG: 10 TABLET, FILM COATED ORAL at 05:08

## 2023-08-14 NOTE — PROGRESS NOTES
The patient has had an E-Consult completed. Please refer to that note as needed. Please communicate the information below to the patient. Based on the recommendations of the specialist, I recommend that the patient do the following:    Xarelto not canceled, needs to continue

## 2023-08-14 NOTE — PROGRESS NOTES
[facility]  Hospital Medicine Progress Note        Chief Complaint: Inpatient Follow-up    HPI:   88-year-old male with significant history of PAF on Xarelto, systolic heart failure with recent echocardiogram showing ejection fraction-45-50%, HTN, HLD, medical noncompliance, heavy tobacco use .  patient was recently started on Xarelto for thromboembolic prophylaxis for AFib.  He had a recent hospitalization from 08/04 to 8/6 for acute decompensated heart failure with AFib and was discharged on diuretics, Xarelto and metoprolol.  Patient presented back to the ED with complaints of worsening shortness of breath, nonproductive cough and also melanotic stools.  cough is chronic for the past several months, but lately has been worsening.  Patient initially presented to outlying facility ED and was prescribed azithromycin, Lasix and was discharged home.  but presented back to the ED given persistent complaints.  Patient was hemodynamically stable.  Lab significant for anemia with hemoglobin-11, INR slightly elevated.  Patient initiated on IV Lasix, IV Protonix and was admitted to hospitalist medicine service.  Patient was previously heavy smoker, quit 40 years back.  GI services consulted for GI bleed.  Xarelto held.  CT chest in the ED showed bilateral pleural effusion, cardiomegaly and small pericardial effusion, possible interstitial lung disease.  Did have expiratory wheezes.  Initiated bronchodilators.  Also initiated low-dose prednisone.  Repeat echocardiogram ordered on 08/20 given concern for pericardial effusion is CT.  No pericardial effusion, but EF dropped to 35% .  Decided to consult Cardiology.  Cardiology recommended to continue medical management, plan for ischemic evaluation as outpatient.  GI evaluated, since hemoglobin was stable no plans for endoscopic evaluation, cleared for anticoagulation.  Xarelto initiated on 8/13.     Interval Hx:     Patient seen at bedside.   Comfortably sitting at the side of the bed.  When I saw him in the meaning reported that he had 2 bowel movements since yesterday which were lighter, but later today had a bowel movement which was dark.  Hemodynamics stable.  Respiratory status stable.      Objective/physical exam:  General: In no acute distress, afebrile  Chest: Clear to auscultation bilaterally  Heart: S1, S2, no appreciable murmur  Abdomen: Soft, nontender, BS +  MSK: Warm, no lower extremity edema, no clubbing or cyanosis  Neurologic: Alert and oriented x4, moving all extremities with good strength     VITAL SIGNS: 24 HRS MIN & MAX LAST   Temp  Min: 96.9 °F (36.1 °C)  Max: 98.8 °F (37.1 °C) 98.8 °F (37.1 °C)   BP  Min: 113/72  Max: 142/83 131/81   Pulse  Min: 72  Max: 81  78   Resp  Min: 19  Max: 19 19   SpO2  Min: 92 %  Max: 97 % (!) 93 %       Recent Labs   Lab 08/14/23  0418 08/14/23  1448   WBC 11.43  --    RBC 3.92*  --    HGB 10.7* 10.9*   HCT 32.2* 33.2*   MCV 82.1  --    MCH 27.3  --    MCHC 33.2  --    RDW 15.0  --    *  --    MPV 9.2  --          Recent Labs   Lab 08/12/23  0420 08/13/23  0348 08/14/23  0418   *   < > 137   K 3.9   < > 4.0   CO2 28   < > 28   BUN 14.1   < > 20.1   CREATININE 1.03   < > 1.17   CALCIUM 8.3*   < > 8.5*   MG 2.10  --   --    ALBUMIN 2.8*   < > 2.8*   ALKPHOS 54   < > 56   ALT 26   < > 22   AST 19   < > 17   BILITOT 0.6   < > 0.4    < > = values in this interval not displayed.          Microbiology Results (last 7 days)       ** No results found for the last 168 hours. **             Scheduled Med:   aspirin  81 mg Oral Daily    fluticasone furoate-vilanteroL  1 puff Inhalation Daily    furosemide (LASIX) injection  40 mg Intravenous Q12H    guaiFENesin  600 mg Oral BID    metoprolol succinate  75 mg Oral Daily    pantoprazole  40 mg Oral BID AC    predniSONE  20 mg Oral Daily    rivaroxaban  20 mg Oral Daily with dinner    sucralfate  1 g Oral QID (AC & HS)    valsartan  40 mg Oral Daily           Assessment/Plan:    Acute decompensated systolic heart failure with ejection fraction-35%   Bilateral pleural effusion, left more than right secondary to above   Chronic interstitial lung disease with mild acute exacerbation  Suspected acute GI bleed-unclear etiology   Mild acute blood loss anemia   Recent diagnosis AFib on Xarelto   History of heavy tobacco use  Essential HTN-stable   HLD  Medical noncompliance  Prophylaxis    Patient had recent echocardiogram which revealed ejection fraction-45%  CT chest 8/11 with concerns for pericardial effusion and therefore an echocardiogram was repeated 8/12  Drop in ejection fraction to 35%  Cardiology consulted  On IV Lasix 40 mg b.i.d.  On beta-blocker, added aspirin  Cardiology evaluated, planning for outpatient ischemic evaluation  GI consulted to further evaluate melanotic stools   No indication for urgent endoscopic evaluation   GI okay with resuming anticoagulation   Xarelto resumed 8/13  Continue p.o. PPI, Carafate  Had 1 episode of dark stool today, hemoglobin repeated again in the evening-stable  Concern for interstitial lung disease per CT, possible COPD/emphysema  Mild exacerbation noted   P.o. prednisone 20 mg daily x5 days -day 3 today  On bronchodilators  Will arrange outpatient pulmonology appointment  DVT prophylaxis-Xarelto resumed    Plan was to DC home today on goal-directed medical treatment for cardiomyopathy and follow-up with cardiology as outpatient for ischemic eval  After discharge orders placed patient had an episode of dark stool   Decided to hold DC and repeat hemoglobin, but hemoglobin has come back stable and no more episodes of dark stool  However the patient is very apprehensive about going home today, requesting to stay 1 more night to make sure hemoglobin is stable   I have ordered repeat labs for tomorrow morning   If stable and no more episodes of dark stool he can go home tomorrow    Kaylin Mohan MD   08/14/2023

## 2023-08-14 NOTE — PROGRESS NOTES
[facility] - 8th Floor Med Surg    Cardiology  Progress Note    Patient Name: Lizandro Martinez  MRN: [id number]  Admission Date: 8/11/2023  Hospital Length of Stay: 3 days  Code Status: Full Code   Attending Provider: Kaylin Mohan MD   Consulting Provider: CHARLIE Hewitt  Primary Care Physician: Edward, [facility]  Principal Problem:GI bleed    Patient information was obtained from patient, past medical records, ER records, and primary team.     Subjective:   Consultation Reason: Heart Failure/LV Function Reduced    HPI:   Mr. Martinez is a 88 year old male, known to Dr. CIS through previous hospitalization (Dr. Skelton), who presented to the hospital with dark stools and worsening shortness of breath. Patient was recently evaluated in the hospital on 8.4.23-8.6.23 where CIS evaluated the patient for AF and HF. He was diuresed and placed on Xarelto and beta blockade and discharged home, to return with above stated symptoms. On 8.5.23 EF 45-50%, repeat Echocardiogram on 8.12.23 EF noted to be 35%. Hemodynamically, patient stable. BNP noted to be 439, 366. Troponin values noted to be normal.   CT Imaging revealed widespread chronic lung parenchymal changes without evidence of acute pulmonary process & Small volume pericardial and bilateral pleural effusions. CIS is consulted given reduction in LV Function on echocardiogram.      Hospital Course:  08.14.23: Patient sitting up at side of bed. NAD. VSS. Denies any complaints. Plans for DC home today.         PMH: AF (Xarelto), LVSD EF 45-50%, Hypertension, Hyperlipidemia, Chronic Pain, History of Medical Noncompliance, Nicotine Dependence  PSH: Fracture Surgery  Family History: Mother- Hypertension/Hyperlipidemia/Cancer, Father- Dementia  Social History: Tobacco- Heavy Smoker/Active Use, Alcohol- Daily Use/6 Pack Beer Daily, Substance Abuse- Negative    Previous Cardiac Diagnostics:   CT Chest with/without Contrast  (8.12.23):  IMPRESSION  Widespread chronic lung parenchymal changes without evidence of acute pulmonary process.  Small volume pericardial and bilateral pleural effusions.  Chronic sequela of prior granulomatous disease.  Additional chronic secondary details discussed above.    Echocardiogram (8.12.23):  Left Ventricle: The left ventricle is normal in size. Mildly increased wall thickness. Moderate global hypokinesis present. There is moderately reduced systolic function. Ejection fraction by visual approximation is 35%.  Left Atrium: Left atrium is severely dilated.  Right Ventricle: Mild right ventricular enlargement. Systolic function is mildly reduced.  Right Atrium: Right atrium is moderately dilated.  Aortic Valve: There is mild aortic valve sclerosis. There is mild aortic regurgitation.  Mitral Valve: There is no stenosis. There is mild regurgitation.  Tricuspid Valve: There is mild regurgitation.  Pulmonic Valve: There is no significant stenosis. There is mild regurgitation.  Pericardium: Left pleural effusion.    Echocardiogram (8.5.23):  Left Ventricle: The left ventricle is mildly dilated. Normal wall thickness. Normal wall motion. There is mildly reduced systolic function with a visually estimated ejection fraction of 45 - 50%. Unable to assess diastolic function due to arrhythmia. Elevated left ventricular filling pressure.  Left Atrium: Left atrium is severely dilated.  Right Ventricle: Mild right ventricular enlargement.   Systolic function is normal.Right Atrium: Right atrium is severely dilated.  Aortic Valve: The aortic valve is a trileaflet valve. Mildly restricted motion.  Mitral Valve: Moderately thickened leaflets. There is mild regurgitation.  Pulmonic Valve: There is mild regurgitation.  Pericardium: Small circumferential pericardial effusion present. No indication of cardiac tamponade.    Review of patient's allergies indicates:   Allergen Reactions    Colestipol     Meloxicam      Rosuvastatin     Simvastatin     Statins-hmg-coa reductase inhibitors     Tramadol        No current facility-administered medications on file prior to encounter.     Current Outpatient Medications on File Prior to Encounter   Medication Sig    hawthorn 500 mg Cap Take 1 capsule by mouth.    levalbuterol (XOPENEX) 0.63 mg/3 mL nebulizer solution Take 3 mLs (0.63 mg total) by nebulization every 8 (eight) hours. Rescue    metoprolol succinate (TOPROL-XL) 25 MG 24 hr tablet Take 3 tablets (75 mg total) by mouth once daily.    rivaroxaban (XARELTO) 20 mg Tab Take 1 tablet (20 mg total) by mouth daily with dinner or evening meal.    [DISCONTINUED] azithromycin (Z-JOSSY) 250 MG tablet Take 2 tablets by mouth on day 1; Take 1 tablet by mouth on days 2-5    [DISCONTINUED] furosemide (LASIX) 20 MG tablet Take 2 tablets (40 mg total) by mouth once daily.    [DISCONTINUED] furosemide (LASIX) 40 MG tablet Take 1 tablet (40 mg total) by mouth once daily. (Patient not taking: Reported on 8/8/2023)    [DISCONTINUED] levalbuterol (XOPENEX) 1.25 mg/3 mL nebulizer solution Take 3 mLs (1.25 mg total) by nebulization every 8 (eight) hours as needed for Wheezing. Rescue (Patient not taking: Reported on 8/8/2023)    [DISCONTINUED] metoprolol succinate (TOPROL-XL) 25 MG 24 hr tablet Take 1 tablet (25 mg total) by mouth once daily.    [DISCONTINUED] metoprolol succinate (TOPROL-XL) 25 MG 24 hr tablet Take 3 tablets (75 mg total) by mouth once daily.    [DISCONTINUED] rivaroxaban (XARELTO) 20 mg Tab Take 1 tablet (20 mg total) by mouth daily with dinner or evening meal.     Review of Systems   Constitutional:  Positive for fatigue.   Respiratory:  Negative for chest tightness and shortness of breath.    Cardiovascular:  Negative for chest pain.   Gastrointestinal:  Positive for blood in stool.   All other systems reviewed and are negative.      Objective:     Vital Signs (Most Recent):  Temp: 97.9 °F (36.6 °C) (08/14/23 0748)  Pulse: 81  (08/14/23 0938)  Resp: 19 (08/14/23 0938)  BP: 116/70 (08/14/23 0937)  SpO2: (!) 92 % (08/14/23 0748) Vital Signs (24h Range):  Temp:  [97.6 °F (36.4 °C)-98.5 °F (36.9 °C)] 97.9 °F (36.6 °C)  Pulse:  [72-89] 81  Resp:  [18-19] 19  SpO2:  [91 %-95 %] 92 %  BP: (113-142)/(69-87) 116/70     Weight: 71.2 kg (157 lb)  Body mass index is 22.53 kg/m².    SpO2: (!) 92 %         Intake/Output Summary (Last 24 hours) at 8/14/2023 0954  Last data filed at 8/14/2023 0600  Gross per 24 hour   Intake 720 ml   Output 1000 ml   Net -280 ml         Lines/Drains/Airways       Peripheral Intravenous Line  Duration                  Peripheral IV - Single Lumen 08/11/23 1539 22 G Anterior;Left;Proximal Forearm 2 days                  Significant Labs:  Recent Results (from the past 72 hour(s))   Comprehensive metabolic panel    Collection Time: 08/11/23 12:59 PM   Result Value Ref Range    Sodium Level 139 136 - 145 mmol/L    Potassium Level 4.4 3.5 - 5.1 mmol/L    Chloride 102 98 - 107 mmol/L    Carbon Dioxide 28 23 - 31 mmol/L    Glucose Level 161 (H) 82 - 115 mg/dL    Blood Urea Nitrogen 16.6 8.4 - 25.7 mg/dL    Creatinine 1.15 0.73 - 1.18 mg/dL    Calcium Level Total 8.6 (L) 8.8 - 10.0 mg/dL    Protein Total 6.4 5.8 - 7.6 gm/dL    Albumin Level 3.0 (L) 3.4 - 4.8 g/dL    Globulin 3.4 2.4 - 3.5 gm/dL    Albumin/Globulin Ratio 0.9 (L) 1.1 - 2.0 ratio    Bilirubin Total 0.6 <=1.5 mg/dL    Alkaline Phosphatase 58 40 - 150 unit/L    Alanine Aminotransferase 27 0 - 55 unit/L    Aspartate Aminotransferase 20 5 - 34 unit/L    eGFR >60 mls/min/1.73/m2   CBC with Differential    Collection Time: 08/11/23 12:59 PM   Result Value Ref Range    WBC 7.87 4.50 - 11.50 x10(3)/mcL    RBC 4.23 (L) 4.70 - 6.10 x10(6)/mcL    Hgb 11.3 (L) 14.0 - 18.0 g/dL    Hct 35.0 (L) 42.0 - 52.0 %    MCV 82.7 80.0 - 94.0 fL    MCH 26.7 (L) 27.0 - 31.0 pg    MCHC 32.3 (L) 33.0 - 36.0 g/dL    RDW 15.1 11.5 - 17.0 %    Platelet 392 130 - 400 x10(3)/mcL    MPV 9.2 7.4 -  10.4 fL    Neut % 71.1 %    Lymph % 17.7 %    Mono % 8.9 %    Eos % 1.4 %    Basophil % 0.4 %    Lymph # 1.39 0.6 - 4.6 x10(3)/mcL    Neut # 5.60 2.1 - 9.2 x10(3)/mcL    Mono # 0.70 0.1 - 1.3 x10(3)/mcL    Eos # 0.11 0 - 0.9 x10(3)/mcL    Baso # 0.03 <=0.2 x10(3)/mcL    IG# 0.04 0 - 0.04 x10(3)/mcL    IG% 0.5 %    NRBC% 0.0 %   Troponin I    Collection Time: 08/11/23 12:59 PM   Result Value Ref Range    Troponin-I 0.017 0.000 - 0.045 ng/mL   Brain natriuretic peptide    Collection Time: 08/11/23 12:59 PM   Result Value Ref Range    Natriuretic Peptide 366.6 (H) <=100.0 pg/mL   Ferritin    Collection Time: 08/11/23 12:59 PM   Result Value Ref Range    Ferritin Level 370.93 (H) 21.81 - 274.66 ng/mL   Iron and TIBC    Collection Time: 08/11/23 12:59 PM   Result Value Ref Range    Iron Binding Capacity Unsaturated 243 (H) 69 - 240 ug/dL    Iron Level 23 (L) 65 - 175 ug/dL    Transferrin 248 mg/dL    Iron Binding Capacity Total 266 250 - 450 ug/dL    Iron Saturation 9 (L) 20 - 50 %   Vitamin B12    Collection Time: 08/11/23 12:59 PM   Result Value Ref Range    Vitamin B12 Level 290 213 - 816 pg/mL   Protime-INR    Collection Time: 08/11/23  1:33 PM   Result Value Ref Range    PT 38.0 (H) 12.5 - 14.5 seconds    INR 4.0 (H) <=1.3   Urinalysis, Reflex to Urine Culture    Collection Time: 08/11/23  3:39 PM    Specimen: Urine   Result Value Ref Range    Color, UA Yellow Yellow, Light-Yellow, Dark Yellow, Viki, Straw    Appearance, UA Clear Clear    Specific Gravity, UA 1.008 1.005 - 1.030    pH, UA 6.5 5.0 - 8.5    Protein, UA Negative Negative    Glucose, UA Negative Negative, Normal    Ketones, UA Negative Negative    Blood, UA Negative Negative    Bilirubin, UA Negative Negative    Urobilinogen, UA 1.0 0.2, 1.0, Normal    Nitrites, UA Negative Negative    Leukocyte Esterase, UA Negative Negative   Urinalysis, Microscopic    Collection Time: 08/11/23  3:39 PM   Result Value Ref Range    RBC, UA <5 <=5 /HPF    WBC, UA <5  <=5 /HPF    Squamous Epithelial Cells, UA <5 <=5 /HPF    Bacteria, UA None Seen None Seen, Rare, Occasional /HPF   Hemoglobin and Hematocrit    Collection Time: 08/11/23 10:11 PM   Result Value Ref Range    Hgb 11.5 (L) 14.0 - 18.0 g/dL    Hct 35.6 (L) 42.0 - 52.0 %   Comprehensive Metabolic Panel    Collection Time: 08/12/23  4:20 AM   Result Value Ref Range    Sodium Level 135 (L) 136 - 145 mmol/L    Potassium Level 3.9 3.5 - 5.1 mmol/L    Chloride 99 98 - 107 mmol/L    Carbon Dioxide 28 23 - 31 mmol/L    Glucose Level 105 82 - 115 mg/dL    Blood Urea Nitrogen 14.1 8.4 - 25.7 mg/dL    Creatinine 1.03 0.73 - 1.18 mg/dL    Calcium Level Total 8.3 (L) 8.8 - 10.0 mg/dL    Protein Total 5.9 5.8 - 7.6 gm/dL    Albumin Level 2.8 (L) 3.4 - 4.8 g/dL    Globulin 3.1 2.4 - 3.5 gm/dL    Albumin/Globulin Ratio 0.9 (L) 1.1 - 2.0 ratio    Bilirubin Total 0.6 <=1.5 mg/dL    Alkaline Phosphatase 54 40 - 150 unit/L    Alanine Aminotransferase 26 0 - 55 unit/L    Aspartate Aminotransferase 19 5 - 34 unit/L    eGFR >60 mls/min/1.73/m2   Magnesium    Collection Time: 08/12/23  4:20 AM   Result Value Ref Range    Magnesium Level 2.10 1.60 - 2.60 mg/dL   Protime-INR    Collection Time: 08/12/23  4:20 AM   Result Value Ref Range    PT 20.7 (H) 12.5 - 14.5 seconds    INR 1.8 (H) <=1.3   Folate    Collection Time: 08/12/23  4:20 AM   Result Value Ref Range    Folate Level 19.1 7.0 - 31.4 ng/mL   Hemoglobin A1C    Collection Time: 08/12/23  4:20 AM   Result Value Ref Range    Hemoglobin A1c 6.6 <=7.0 %    Estimated Average Glucose 142.7 mg/dL   CBC with Differential    Collection Time: 08/12/23  4:20 AM   Result Value Ref Range    WBC 7.57 4.50 - 11.50 x10(3)/mcL    RBC 3.92 (L) 4.70 - 6.10 x10(6)/mcL    Hgb 10.7 (L) 14.0 - 18.0 g/dL    Hct 33.0 (L) 42.0 - 52.0 %    MCV 84.2 80.0 - 94.0 fL    MCH 27.3 27.0 - 31.0 pg    MCHC 32.4 (L) 33.0 - 36.0 g/dL    RDW 15.1 11.5 - 17.0 %    Platelet 364 130 - 400 x10(3)/mcL    MPV 8.8 7.4 - 10.4 fL     Neut % 65.9 %    Lymph % 20.3 %    Mono % 10.6 %    Eos % 2.4 %    Basophil % 0.4 %    Lymph # 1.54 0.6 - 4.6 x10(3)/mcL    Neut # 4.99 2.1 - 9.2 x10(3)/mcL    Mono # 0.80 0.1 - 1.3 x10(3)/mcL    Eos # 0.18 0 - 0.9 x10(3)/mcL    Baso # 0.03 <=0.2 x10(3)/mcL    IG# 0.03 0 - 0.04 x10(3)/mcL    IG% 0.4 %    NRBC% 0.0 %   Echo    Collection Time: 08/12/23 10:58 AM   Result Value Ref Range    BSA 1.98 m2    LVIDd 4.60 3.5 - 6.0 cm    LV Systolic Volume 63.10 mL    LV Systolic Volume Index 32.0 mL/m2    LVIDs 3.83 2.1 - 4.0 cm    LV Diastolic Volume 97.30 mL    LV Diastolic Volume Index 49.39 mL/m2    IVS 1.18 (A) 0.6 - 1.1 cm    FS 17 (A) 28 - 44 %    Left Ventricle Relative Wall Thickness 0.57 cm    Posterior Wall 1.32 (A) 0.6 - 1.1 cm    LV mass 217.40 g    LV Mass Index 110 g/m2    MV Peak E Tyree 0.70 m/s    MV Peak A Tyree 0.28 m/s    TR Max Tyree 2.34 m/s    E/A ratio 2.50     E wave deceleration time 201.00 msec    LVOT peak tyree 0.85 m/s    Left Ventricular Outflow Tract Mean Velocity 0.52 cm/s    Left Ventricular Outflow Tract Mean Gradient 1.00 mmHg    LA volume (mod) 133.00 cm3    LA Volume Index (Mod) 67.5 mL/m2    TAPSE 1.07 cm    AV mean gradient 3 mmHg    AV peak gradient 6 mmHg    Ao peak tyree 1.21 m/s    Ao VTI 19.80 cm    LVOT peak VTI 16.30 cm    AV Velocity Ratio 0.70     AV index (prosthetic) 0.82     Triscuspid Valve Regurgitation Peak Gradient 22 mmHg    ZLVIDS 0.76     ZLVIDD -2.08     EF 35 %   Comprehensive Metabolic Panel    Collection Time: 08/13/23  3:48 AM   Result Value Ref Range    Sodium Level 139 136 - 145 mmol/L    Potassium Level 4.3 3.5 - 5.1 mmol/L    Chloride 98 98 - 107 mmol/L    Carbon Dioxide 29 23 - 31 mmol/L    Glucose Level 111 82 - 115 mg/dL    Blood Urea Nitrogen 11.6 8.4 - 25.7 mg/dL    Creatinine 1.17 0.73 - 1.18 mg/dL    Calcium Level Total 8.7 (L) 8.8 - 10.0 mg/dL    Protein Total 6.4 5.8 - 7.6 gm/dL    Albumin Level 3.0 (L) 3.4 - 4.8 g/dL    Globulin 3.4 2.4 - 3.5 gm/dL     Albumin/Globulin Ratio 0.9 (L) 1.1 - 2.0 ratio    Bilirubin Total 0.6 <=1.5 mg/dL    Alkaline Phosphatase 60 40 - 150 unit/L    Alanine Aminotransferase 28 0 - 55 unit/L    Aspartate Aminotransferase 22 5 - 34 unit/L    eGFR >60 mls/min/1.73/m2   CBC with Differential    Collection Time: 08/13/23  3:48 AM   Result Value Ref Range    WBC 8.71 4.50 - 11.50 x10(3)/mcL    RBC 4.40 (L) 4.70 - 6.10 x10(6)/mcL    Hgb 11.9 (L) 14.0 - 18.0 g/dL    Hct 36.3 (L) 42.0 - 52.0 %    MCV 82.5 80.0 - 94.0 fL    MCH 27.0 27.0 - 31.0 pg    MCHC 32.8 (L) 33.0 - 36.0 g/dL    RDW 14.9 11.5 - 17.0 %    Platelet 438 (H) 130 - 400 x10(3)/mcL    MPV 8.9 7.4 - 10.4 fL    Neut % 65.4 %    Lymph % 21.2 %    Mono % 10.2 %    Eos % 2.3 %    Basophil % 0.6 %    Lymph # 1.85 0.6 - 4.6 x10(3)/mcL    Neut # 5.69 2.1 - 9.2 x10(3)/mcL    Mono # 0.89 0.1 - 1.3 x10(3)/mcL    Eos # 0.20 0 - 0.9 x10(3)/mcL    Baso # 0.05 <=0.2 x10(3)/mcL    IG# 0.03 0 - 0.04 x10(3)/mcL    IG% 0.3 %    NRBC% 0.0 %   Comprehensive Metabolic Panel    Collection Time: 08/14/23  4:18 AM   Result Value Ref Range    Sodium Level 137 136 - 145 mmol/L    Potassium Level 4.0 3.5 - 5.1 mmol/L    Chloride 99 98 - 107 mmol/L    Carbon Dioxide 28 23 - 31 mmol/L    Glucose Level 137 (H) 82 - 115 mg/dL    Blood Urea Nitrogen 20.1 8.4 - 25.7 mg/dL    Creatinine 1.17 0.73 - 1.18 mg/dL    Calcium Level Total 8.5 (L) 8.8 - 10.0 mg/dL    Protein Total 6.0 5.8 - 7.6 gm/dL    Albumin Level 2.8 (L) 3.4 - 4.8 g/dL    Globulin 3.2 2.4 - 3.5 gm/dL    Albumin/Globulin Ratio 0.9 (L) 1.1 - 2.0 ratio    Bilirubin Total 0.4 <=1.5 mg/dL    Alkaline Phosphatase 56 40 - 150 unit/L    Alanine Aminotransferase 22 0 - 55 unit/L    Aspartate Aminotransferase 17 5 - 34 unit/L    eGFR >60 mls/min/1.73/m2   CBC with Differential    Collection Time: 08/14/23  4:18 AM   Result Value Ref Range    WBC 11.43 4.50 - 11.50 x10(3)/mcL    RBC 3.92 (L) 4.70 - 6.10 x10(6)/mcL    Hgb 10.7 (L) 14.0 - 18.0 g/dL    Hct 32.2 (L)  42.0 - 52.0 %    MCV 82.1 80.0 - 94.0 fL    MCH 27.3 27.0 - 31.0 pg    MCHC 33.2 33.0 - 36.0 g/dL    RDW 15.0 11.5 - 17.0 %    Platelet 434 (H) 130 - 400 x10(3)/mcL    MPV 9.2 7.4 - 10.4 fL    Neut % 72.2 %    Lymph % 17.1 %    Mono % 8.9 %    Eos % 1.1 %    Basophil % 0.3 %    Lymph # 1.96 0.6 - 4.6 x10(3)/mcL    Neut # 8.24 2.1 - 9.2 x10(3)/mcL    Mono # 1.02 0.1 - 1.3 x10(3)/mcL    Eos # 0.13 0 - 0.9 x10(3)/mcL    Baso # 0.03 <=0.2 x10(3)/mcL    IG# 0.05 (H) 0 - 0.04 x10(3)/mcL    IG% 0.4 %    NRBC% 0.0 %     Significant Imaging:  Imaging Results              X-Ray Chest AP Portable (Final result)  Result time 08/11/23 15:17:42      Final result by Linwood Loera MD (08/11/23 15:17:42)                   Impression:      No adverse interval change.  Left-sided pleural effusion remains.      Electronically signed by: Linwood Loera  Date:    08/11/2023  Time:    15:17               Narrative:    EXAMINATION:  XR CHEST AP PORTABLE    CLINICAL HISTORY:  Shortness of breath    TECHNIQUE:  Single view of the chest    COMPARISON:  08/10/2023    FINDINGS:  No focal opacification.  Left-sided pleural effusion similar to prior.  Cardiomegaly is unchanged.  No pneumothorax.                                    Telemetry:  AF    Physical Exam  Vitals and nursing note reviewed.   Constitutional:       General: He is not in acute distress.     Appearance: Normal appearance.   HENT:      Head: Normocephalic.      Mouth/Throat:      Mouth: Mucous membranes are moist.      Pharynx: Oropharynx is clear.   Cardiovascular:      Rate and Rhythm: Normal rate. Rhythm irregular.      Heart sounds: Normal heart sounds.   Pulmonary:      Effort: Pulmonary effort is normal. No respiratory distress.   Abdominal:      Palpations: Abdomen is soft.      Tenderness: There is no guarding.   Musculoskeletal:      Cervical back: Neck supple.   Skin:     General: Skin is warm and dry.   Neurological:      Mental Status: He is alert. Mental status  is at baseline.   Psychiatric:         Behavior: Behavior normal.     Current Inpatient Medications:    Current Facility-Administered Medications:     acetaminophen tablet 1,000 mg, 1,000 mg, Oral, Q6H PRN, Edmond Auguste MD, 1,000 mg at 08/12/23 1205    acetaminophen tablet 650 mg, 650 mg, Oral, Q4H PRN, Edmond Auguste MD    aluminum-magnesium hydroxide-simethicone 200-200-20 mg/5 mL suspension 30 mL, 30 mL, Oral, QID PRN, Edmond Auguste MD    aspirin chewable tablet 81 mg, 81 mg, Oral, Daily, Kaylin Mohan MD, 81 mg at 08/14/23 0937    fluticasone furoate-vilanteroL 100-25 mcg/dose diskus inhaler 1 puff, 1 puff, Inhalation, Daily, Aurora Fernández, FNP, 1 puff at 08/14/23 0938    furosemide injection 40 mg, 40 mg, Intravenous, Q12H, Edmond Auguste MD, 40 mg at 08/14/23 0546    guaiFENesin 100 mg/5 ml syrup 200 mg, 200 mg, Oral, Q4H PRN, Aurora Fernández, FNP    guaiFENesin 12 hr tablet 600 mg, 600 mg, Oral, BID, Edmond Auguste MD, 600 mg at 08/14/23 0937    levalbuterol nebulizer solution 1.25 mg, 1 ampule, Nebulization, Q8H PRN, Edmond Auguste MD    melatonin tablet 6 mg, 6 mg, Oral, Nightly PRN, Edmond Auguste MD    metoprolol injection 5 mg, 5 mg, Intravenous, Q6H PRN, Edmond Auguste MD    metoprolol succinate 24 hr tablet 75 mg, 75 mg, Oral, Daily, Edmond Auguste MD, 75 mg at 08/14/23 0937    ondansetron injection 4 mg, 4 mg, Intravenous, Q4H PRN, Edmond Auguste MD    pantoprazole EC tablet 40 mg, 40 mg, Oral, BID AC, Kaylin Mohan MD, 40 mg at 08/14/23 0547    polyethylene glycol packet 17 g, 17 g, Oral, BID PRN, Edmond Auguste MD    predniSONE tablet 20 mg, 20 mg, Oral, Daily, Kaylin Mohan MD, 20 mg at 08/14/23 0937    prochlorperazine injection Soln 5 mg, 5 mg, Intravenous, Q6H PRN, Molina, Edmond CHAVARRIA MD    rivaroxaban tablet 20 mg, 20 mg, Oral, Daily with dinner, Kaylin Mohan MD, 20 mg at 08/13/23 1718    senna-docusate 8.6-50 mg per tablet 2 tablet, 2 tablet, Oral, BID PRN, Molina, Edmond CHAVARRIA MD    sodium chloride 0.9%  flush 10 mL, 10 mL, Intravenous, PRN, Edmond Auguste MD    sucralfate tablet 1 g, 1 g, Oral, QID (AC & HS), Pauline Kwon, DARY, 1 g at 08/14/23 0547    VTE Risk Mitigation (From admission, onward)           Ordered     rivaroxaban tablet 20 mg  With dinner         08/13/23 1031     IP VTE HIGH RISK PATIENT  Once         08/11/23 2053     Place sequential compression device  Until discontinued         08/11/23 2053                  Assessment:   Acute/Chronic Systolic Heart Failure  --appears compensated  Cardiomyopathy/Left Ventricular Systolic Dysfunction (Unspecified for Now)    - EF 35% Down from EF 45-50% (A few Days Prior)    - Bilateral Pleural Effusions  Chronic Interstitial Lung Disease  Hypertension (BP Stable)  Hyperlipidemia    - Statin Intolerance  Polysubstance Abuse- Nicotine Dependence/History of Heavy Tobacco Use & Regular Alcohol Consumption (Reported Recent Cessation)\  Atrial Fibrillation (Recent New Diagnosis on 8.5.23)    - On Xarelto Outpatient    - [id number]: 4 (LVSD/HTN/Age)  Melena with Suspected Acute GI Bleed    - GI Workup   Anemia/stable  History of Medical Noncompliance    Plan:   Optimize CHF GDMT- DC IV lasix. Start Lasix 40 mg daily and valsartan 40 mg daily. Continue Metoprolol succinate 75 mg daily. Continue optimization of HF medications on f/u   Seen by GI- no plans for EGD currently. They have Ok'd resuming xarelto.   If worsening anemia occurs, will plan for outpatient Watchman evaluation Will plan for  ischemic workup on outpatient basis- Rachael spec Re: LVSD/CAD Risk Factors.  On PPI per GI recommendations  K+ Goal- 4, Mag Goal- 2          Nila Angel, CHARLIE  Cardiology  [facility] - 8th Floor Med Surg  08/14/2023

## 2023-08-14 NOTE — PLAN OF CARE
08/14/23 0914   Discharge Assessment   Assessment Type Discharge Planning Assessment   Confirmed/corrected address, phone number and insurance Yes   Confirmed Demographics Correct on Facesheet   Source of Information patient   When was your last doctors appointment?   (Pt was seen last year, he states he is on Team C, speaks to nurse prn)   Reason For Admission GI Bleed   People in Home alone   Do you expect to return to your current living situation? Yes   Do you have help at home or someone to help you manage your care at home? Yes   Who are your caregiver(s) and their phone number(s)? Pt has [facility] that come 13 h per week   Current cognitive status: Alert/Oriented;No Deficits   Equipment Currently Used at Home blood pressure machine;nebulizer   Do you currently have service(s) that help you manage your care at home? Yes   How Many hours does patient receive services 13   Name and Contact number of agency [facility]   Is the pt/caregiver preference to resume services with current agency Yes   Do you take prescription medications? Yes   Do you have prescription coverage? Yes   Coverage [facility]   Do you have any problems affording any of your prescribed medications? No   Is the patient taking medications as prescribed? yes   Who is going to help you get home at discharge? Pt son René Naik [phone]   How do you get to doctors appointments? car, drives self   Are you on dialysis? No   Do you take coumadin? No   Discharge Plan A Home Health   Discharge Plan B Home with family   Discharge Plan discussed with: Patient;Adult children   Transition of Care Barriers None     Patient states he lives in a single level home alone. He states there is a ramp to enter the home through the front. He states he enters the home through the kitchen which has 3 steps with a grab bar to help him enter the home. His son René Naik [phone] is at bedside. Pt states he has caregivers from [facility] provides services  13 h/ week. He states he can feed and bathe himself. He states he is able to drive and obtain is own groceries. Will follow for discharge needs.

## 2023-08-14 NOTE — PLAN OF CARE
Discharge planning discussed with patient. FOC obtained for [facility]. [facility] at [phone]. Spoke to Christiana with [facility]. She needs clinical notes. Will Fax. Referral sent to [facility] via care port.

## 2023-08-15 LAB
ALBUMIN SERPL-MCNC: 2.9 G/DL (ref 3.4–4.8)
ALBUMIN/GLOB SERPL: 0.8 RATIO (ref 1.1–2)
ALP SERPL-CCNC: 51 UNIT/L (ref 40–150)
ALT SERPL-CCNC: 21 UNIT/L (ref 0–55)
AST SERPL-CCNC: 23 UNIT/L (ref 5–34)
BASOPHILS # BLD AUTO: 0.03 X10(3)/MCL
BASOPHILS NFR BLD AUTO: 0.2 %
BILIRUB SERPL-MCNC: 0.3 MG/DL
BUN SERPL-MCNC: 21.6 MG/DL (ref 8.4–25.7)
CALCIUM SERPL-MCNC: 8.8 MG/DL (ref 8.8–10)
CHLORIDE SERPL-SCNC: 100 MMOL/L (ref 98–107)
CO2 SERPL-SCNC: 28 MMOL/L (ref 23–31)
CREAT SERPL-MCNC: 0.9 MG/DL (ref 0.73–1.18)
EOSINOPHIL # BLD AUTO: 0.08 X10(3)/MCL (ref 0–0.9)
EOSINOPHIL NFR BLD AUTO: 0.6 %
ERYTHROCYTE [DISTWIDTH] IN BLOOD BY AUTOMATED COUNT: 15.1 % (ref 11.5–17)
GFR SERPLBLD CREATININE-BSD FMLA CKD-EPI: >60 MLS/MIN/1.73/M2
GLOBULIN SER-MCNC: 3.5 GM/DL (ref 2.4–3.5)
GLUCOSE SERPL-MCNC: 116 MG/DL (ref 82–115)
HCT VFR BLD AUTO: 32.1 % (ref 42–52)
HGB BLD-MCNC: 10.6 G/DL (ref 14–18)
IMM GRANULOCYTES # BLD AUTO: 0.06 X10(3)/MCL (ref 0–0.04)
IMM GRANULOCYTES NFR BLD AUTO: 0.5 %
LYMPHOCYTES # BLD AUTO: 1.73 X10(3)/MCL (ref 0.6–4.6)
LYMPHOCYTES NFR BLD AUTO: 13.3 %
MCH RBC QN AUTO: 27.2 PG (ref 27–31)
MCHC RBC AUTO-ENTMCNC: 33 G/DL (ref 33–36)
MCV RBC AUTO: 82.5 FL (ref 80–94)
MONOCYTES # BLD AUTO: 1.06 X10(3)/MCL (ref 0.1–1.3)
MONOCYTES NFR BLD AUTO: 8.2 %
NEUTROPHILS # BLD AUTO: 10.03 X10(3)/MCL (ref 2.1–9.2)
NEUTROPHILS NFR BLD AUTO: 77.2 %
NRBC BLD AUTO-RTO: 0 %
PLATELET # BLD AUTO: 374 X10(3)/MCL (ref 130–400)
PMV BLD AUTO: 9.9 FL (ref 7.4–10.4)
POTASSIUM SERPL-SCNC: 4.6 MMOL/L (ref 3.5–5.1)
PROT SERPL-MCNC: 6.4 GM/DL (ref 5.8–7.6)
RBC # BLD AUTO: 3.89 X10(6)/MCL (ref 4.7–6.1)
SODIUM SERPL-SCNC: 137 MMOL/L (ref 136–145)
WBC # SPEC AUTO: 12.99 X10(3)/MCL (ref 4.5–11.5)

## 2023-08-15 PROCEDURE — 63600175 PHARM REV CODE 636 W HCPCS: Performed by: INTERNAL MEDICINE

## 2023-08-15 PROCEDURE — 25000003 PHARM REV CODE 250: Performed by: INTERNAL MEDICINE

## 2023-08-15 PROCEDURE — 80053 COMPREHEN METABOLIC PANEL: CPT | Performed by: INTERNAL MEDICINE

## 2023-08-15 PROCEDURE — 85025 COMPLETE CBC W/AUTO DIFF WBC: CPT | Performed by: INTERNAL MEDICINE

## 2023-08-15 PROCEDURE — 25000003 PHARM REV CODE 250: Performed by: NURSE PRACTITIONER

## 2023-08-15 PROCEDURE — 25000003 PHARM REV CODE 250

## 2023-08-15 PROCEDURE — 21400001 HC TELEMETRY ROOM

## 2023-08-15 PROCEDURE — 25000242 PHARM REV CODE 250 ALT 637 W/ HCPCS: Performed by: NURSE PRACTITIONER

## 2023-08-15 RX ORDER — SODIUM, POTASSIUM,MAG SULFATES 17.5-3.13G
1 SOLUTION, RECONSTITUTED, ORAL ORAL ONCE
Status: COMPLETED | OUTPATIENT
Start: 2023-08-15 | End: 2023-08-15

## 2023-08-15 RX ADMIN — FUROSEMIDE 40 MG: 10 INJECTION, SOLUTION INTRAMUSCULAR; INTRAVENOUS at 05:08

## 2023-08-15 RX ADMIN — METOPROLOL SUCCINATE 75 MG: 50 TABLET, EXTENDED RELEASE ORAL at 09:08

## 2023-08-15 RX ADMIN — SUCRALFATE 1 G: 1 TABLET ORAL at 05:08

## 2023-08-15 RX ADMIN — VALSARTAN 40 MG: 40 TABLET, FILM COATED ORAL at 09:08

## 2023-08-15 RX ADMIN — GUAIFENESIN 600 MG: 600 TABLET, EXTENDED RELEASE ORAL at 09:08

## 2023-08-15 RX ADMIN — PANTOPRAZOLE SODIUM 40 MG: 40 TABLET, DELAYED RELEASE ORAL at 06:08

## 2023-08-15 RX ADMIN — FLUTICASONE FUROATE AND VILANTEROL TRIFENATATE 1 PUFF: 100; 25 POWDER RESPIRATORY (INHALATION) at 09:08

## 2023-08-15 RX ADMIN — GUAIFENESIN 600 MG: 600 TABLET, EXTENDED RELEASE ORAL at 08:08

## 2023-08-15 RX ADMIN — SODIUM SULFATE, POTASSIUM SULFATE, MAGNESIUM SULFATE 354 ML: 17.5; 3.13; 1.6 SOLUTION, CONCENTRATE ORAL at 05:08

## 2023-08-15 RX ADMIN — PANTOPRAZOLE SODIUM 40 MG: 40 TABLET, DELAYED RELEASE ORAL at 05:08

## 2023-08-15 RX ADMIN — PREDNISONE 20 MG: 20 TABLET ORAL at 09:08

## 2023-08-15 RX ADMIN — SUCRALFATE 1 G: 1 TABLET ORAL at 08:08

## 2023-08-15 RX ADMIN — SUCRALFATE 1 G: 1 TABLET ORAL at 11:08

## 2023-08-15 RX ADMIN — SUCRALFATE 1 G: 1 TABLET ORAL at 06:08

## 2023-08-15 RX ADMIN — FUROSEMIDE 40 MG: 10 INJECTION, SOLUTION INTRAMUSCULAR; INTRAVENOUS at 06:08

## 2023-08-15 NOTE — PROGRESS NOTES
\[facility]  Hospital Medicine Progress Note        Chief Complaint: Inpatient Follow-up    HPI:   88-year-old male with significant history of PAF on Xarelto, systolic heart failure with recent echocardiogram showing ejection fraction-45-50%, HTN, HLD, medical noncompliance, heavy tobacco use .  patient was recently started on Xarelto for thromboembolic prophylaxis for AFib.  He had a recent hospitalization from 08/04 to 8/6 for acute decompensated heart failure with AFib and was discharged on diuretics, Xarelto and metoprolol.  Patient presented back to the ED with complaints of worsening shortness of breath, nonproductive cough and also melanotic stools.  cough is chronic for the past several months, but lately has been worsening.  Patient initially presented to outlying facility ED and was prescribed azithromycin, Lasix and was discharged home.  but presented back to the ED given persistent complaints.  Patient was hemodynamically stable.  Lab significant for anemia with hemoglobin-11, INR slightly elevated.  Patient initiated on IV Lasix, IV Protonix and was admitted to hospitalist medicine service.  Patient was previously heavy smoker, quit 40 years back.  GI services consulted for GI bleed.  Xarelto held.  CT chest in the ED showed bilateral pleural effusion, cardiomegaly and small pericardial effusion, possible interstitial lung disease.  Did have expiratory wheezes.  Initiated bronchodilators.  Also initiated low-dose prednisone.  Repeat echocardiogram ordered on 08/20 given concern for pericardial effusion is CT.  No pericardial effusion, but EF dropped to 35% .  Decided to consult Cardiology.  Cardiology recommended to continue medical management, plan for ischemic evaluation as outpatient.  GI evaluated, since hemoglobin was stable no plans for endoscopic evaluation, cleared for anticoagulation.  Xarelto initiated on 8/13.  Hemoglobin slightly dropped on 08/14 patient had  couple of episodes of dark bowel movements.  But became lighter later.     Interval Hx:     Patient seen at bedside.  Comfortably sitting at the side of the bed. patient had bright red bleeding per rectum today morning.  Hemodynamics are stable.  No other new complaints.  No abdominal pain      Objective/physical exam:  General: In no acute distress, afebrile  Chest: Clear to auscultation bilaterally  Heart: S1, S2, no appreciable murmur  Abdomen: Soft, nontender, BS +  MSK: Warm, no lower extremity edema, no clubbing or cyanosis  Neurologic: Alert and oriented x4, moving all extremities with good strength     VITAL SIGNS: 24 HRS MIN & MAX LAST   Temp  Min: 96.9 °F (36.1 °C)  Max: 98.8 °F (37.1 °C) 97.4 °F (36.3 °C)   BP  Min: 116/70  Max: 136/66 136/66   Pulse  Min: 64  Max: 93  93   Resp  Min: 19  Max: 20 20   SpO2  Min: 93 %  Max: 97 % 95 %       Recent Labs   Lab 08/15/23  0421   WBC 12.99*   RBC 3.89*   HGB 10.6*   HCT 32.1*   MCV 82.5   MCH 27.2   MCHC 33.0   RDW 15.1      MPV 9.9         Recent Labs   Lab 08/12/23  0420 08/13/23  0348 08/15/23  0421   *   < > 137   K 3.9   < > 4.6   CO2 28   < > 28   BUN 14.1   < > 21.6   CREATININE 1.03   < > 0.90   CALCIUM 8.3*   < > 8.8   MG 2.10  --   --    ALBUMIN 2.8*   < > 2.9*   ALKPHOS 54   < > 51   ALT 26   < > 21   AST 19   < > 23   BILITOT 0.6   < > 0.3    < > = values in this interval not displayed.          Microbiology Results (last 7 days)       ** No results found for the last 168 hours. **             Scheduled Med:   fluticasone furoate-vilanteroL  1 puff Inhalation Daily    furosemide (LASIX) injection  40 mg Intravenous Q12H    guaiFENesin  600 mg Oral BID    metoprolol succinate  75 mg Oral Daily    pantoprazole  40 mg Oral BID AC    predniSONE  20 mg Oral Daily    sucralfate  1 g Oral QID (AC & HS)    valsartan  40 mg Oral Daily          Assessment/Plan:    Acute decompensated systolic heart failure with ejection fraction-35%   Bilateral  pleural effusion, left more than right secondary to above   Chronic interstitial lung disease with mild acute exacerbation  Acute GI bleed-new onset hematochezia 8/15  Mild acute blood loss anemia   Recent diagnosis AFib on Xarelto   History of heavy tobacco use  Essential HTN-stable   HLD  Medical noncompliance  Prophylaxis    Patient had recent echocardiogram which revealed ejection fraction-45%  CT chest 8/11 with concerns for pericardial effusion and therefore an echocardiogram was repeated 8/12  Drop in ejection fraction to 35%  Cardiology consulted  On IV Lasix 40 mg b.i.d.  On beta-blocker,   Cardiology evaluated, planning for outpatient ischemic evaluation  Melanotic stools had improved, but the patient is now having hematochezia  On PPI, Carafate  GI reconsulted  Hemoglobin is still more than 10   GI planning for colonoscopy tomorrow   Clear liquid diet today, NPO after midnight  I will hold Xarelto and aspirin  Concern for interstitial lung disease per CT, possible COPD/emphysema  Mild exacerbation noted   P.o. prednisone 20 mg daily x5 days -day 4 today  On bronchodilators  Will arrange outpatient pulmonology appointment  Mild leukocytosis likely steroid induced  DVT prophylaxis-Xarelto resumed    Colonoscopy tomorrow to further evaluate hematochezia  Kaylin Mohan MD   08/15/2023

## 2023-08-15 NOTE — PROGRESS NOTES
Gastroenterology Progress Note     this patient was seen and independently examined by me.  I agree with the assessment and plan as recorded by the PA below.    Patient was recently hospitalized with GI bleeding.  He was scheduled for outpatient evaluation but re-presented with recurrent bright red blood per rectum.  Shows a picture of some brown stool which is separate from bright red blood in the water.  He has no abdominal or rectal pain.  He had colonoscopy over 10 years ago.  On physical exam the patient is alert in no acute distress his abdomen is soft and nontender.  Impressions lower GI bleeding plan is colonoscopy tomorrow      Subjective:  GI is being called back for recurrent bleeding. Patient seen earlier this admit with dark stools and stable hemoglobin. GI signed off following improvement in bleeding with no indication for inpatient endoscopy.      Patient was stable with plans for discharge yesterday however then had an episode of hematochezia.   Discussed patient care with nurse who reports additional witnessed bowel movement this morning described as bright red blood associated with small dark clots and loose stool. Hgb stable.     Hemoglobin noted to be 11.7 on admit with slight downtrend however has remained stable with most recent hemoglobin 10.6. Patient has not required blood tranfusion.   He is on ASA 81 qd, Xarelto 20mg qd had been held due to concern for GIB, resumed 08/13 and not held.     On exam this morning, patient without any acute GI complaints though voices concern due to persistent bleeding that has now turned to bright red blood (was melena).  He denies abdominal pain, cramping, nausea or vomiting and has been tolerating p.o. without issues.  He does note a history of hemorrhoids in the past with evidence of small amounts of bright red blood on the toilet tissue only, denies previous episodes of bleeding in this volume. He denies constipation at this time, stool loose.   Prior  "[facility]- no [facility] for review and patient unable to provide any additional information.  Denies prior EGD.    Objective:    ROS:    Review of Systems   Constitutional:  Negative for chills and fever.   HENT:  Negative for sore throat.    Respiratory:  Negative for shortness of breath, wheezing and stridor.    Gastrointestinal:  Positive for blood in stool, diarrhea and melena. Negative for abdominal pain, heartburn, nausea and vomiting.   Neurological:  Negative for seizures and loss of consciousness.   Psychiatric/Behavioral:  The patient is not nervous/anxious.          Vital Signs:  /66   Pulse 93   Temp 97.4 °F (36.3 °C) (Oral)   Resp 19   Ht 5' 10" (1.778 m)   Wt 71.2 kg (157 lb)   SpO2 95%   BMI 22.53 kg/m²   Body mass index is 22.53 kg/m².    Physical Exam:    Physical Exam  Constitutional:       General: He is not in acute distress.     Appearance: He is not ill-appearing.   HENT:      Head: Normocephalic and atraumatic.      Nose: Nose normal.   Eyes:      Extraocular Movements: Extraocular movements intact.   Pulmonary:      Effort: Pulmonary effort is normal. No respiratory distress.      Breath sounds: No stridor.   Abdominal:      General: Bowel sounds are normal.      Palpations: Abdomen is soft.   Skin:     General: Skin is warm and dry.      Coloration: Skin is not jaundiced or pale.   Neurological:      General: No focal deficit present.      Mental Status: He is alert and oriented to person, place, and time. Mental status is at baseline.   Psychiatric:         Mood and Affect: Mood normal.         Behavior: Behavior normal.         Thought Content: Thought content normal.         Judgment: Judgment normal.         Labs:  Recent Results (from the past 24 hour(s))   Hemoglobin and Hematocrit    Collection Time: 08/14/23  2:48 PM   Result Value Ref Range    Hgb 10.9 (L) 14.0 - 18.0 g/dL    Hct 33.2 (L) 42.0 - 52.0 %   Comprehensive Metabolic Panel    " Collection Time: 08/15/23  4:21 AM   Result Value Ref Range    Sodium Level 137 136 - 145 mmol/L    Potassium Level 4.6 3.5 - 5.1 mmol/L    Chloride 100 98 - 107 mmol/L    Carbon Dioxide 28 23 - 31 mmol/L    Glucose Level 116 (H) 82 - 115 mg/dL    Blood Urea Nitrogen 21.6 8.4 - 25.7 mg/dL    Creatinine 0.90 0.73 - 1.18 mg/dL    Calcium Level Total 8.8 8.8 - 10.0 mg/dL    Protein Total 6.4 5.8 - 7.6 gm/dL    Albumin Level 2.9 (L) 3.4 - 4.8 g/dL    Globulin 3.5 2.4 - 3.5 gm/dL    Albumin/Globulin Ratio 0.8 (L) 1.1 - 2.0 ratio    Bilirubin Total 0.3 <=1.5 mg/dL    Alkaline Phosphatase 51 40 - 150 unit/L    Alanine Aminotransferase 21 0 - 55 unit/L    Aspartate Aminotransferase 23 5 - 34 unit/L    eGFR >60 mls/min/1.73/m2   CBC with Differential    Collection Time: 08/15/23  4:21 AM   Result Value Ref Range    WBC 12.99 (H) 4.50 - 11.50 x10(3)/mcL    RBC 3.89 (L) 4.70 - 6.10 x10(6)/mcL    Hgb 10.6 (L) 14.0 - 18.0 g/dL    Hct 32.1 (L) 42.0 - 52.0 %    MCV 82.5 80.0 - 94.0 fL    MCH 27.2 27.0 - 31.0 pg    MCHC 33.0 33.0 - 36.0 g/dL    RDW 15.1 11.5 - 17.0 %    Platelet 374 130 - 400 x10(3)/mcL    MPV 9.9 7.4 - 10.4 fL    Neut % 77.2 %    Lymph % 13.3 %    Mono % 8.2 %    Eos % 0.6 %    Basophil % 0.2 %    Lymph # 1.73 0.6 - 4.6 x10(3)/mcL    Neut # 10.03 (H) 2.1 - 9.2 x10(3)/mcL    Mono # 1.06 0.1 - 1.3 x10(3)/mcL    Eos # 0.08 0 - 0.9 x10(3)/mcL    Baso # 0.03 <=0.2 x10(3)/mcL    IG# 0.06 (H) 0 - 0.04 x10(3)/mcL    IG% 0.5 %    NRBC% 0.0 %       Assessment/Plan:  Patient is a 88 y.o. male unknown to our group with a history of atrial fibrillation (on Xarelto), heart failure with reduced ejection fraction (most recent 35%), hypertension, dyslipidemia who presented to the ED with dyspnea and dark stools. GI consulted earlier this admit with no indications for inpatient endoscopy- dark stools had resolved with stable hgb.   We are now being called back for persistent bleeding.     Hematochezia  - melena earlier on admit.  Now BRB.   - could likely be r/t hx hemorrhoids in setting of Xarelto as hgb has remained stable  - clear liquids today and will discuss need for colonoscopy tomorrow for thorough eval- will confirm with MD.   2. New onset afib  - on Xarelto, currently held   3. HF with reduced EF, most recent 35%  - cardiology with plans for outpatient workup        Anita Lake PA-C  Gastroenterology  [facility]

## 2023-08-16 ENCOUNTER — ANESTHESIA EVENT (OUTPATIENT)
Dept: ENDOSCOPY | Facility: HOSPITAL | Age: 88
DRG: 377 | End: 2023-08-16
Payer: OTHER GOVERNMENT

## 2023-08-16 ENCOUNTER — ANESTHESIA (OUTPATIENT)
Dept: ENDOSCOPY | Facility: HOSPITAL | Age: 88
DRG: 377 | End: 2023-08-16
Payer: OTHER GOVERNMENT

## 2023-08-16 LAB
ALBUMIN SERPL-MCNC: 2.9 G/DL (ref 3.4–4.8)
ALBUMIN/GLOB SERPL: 0.9 RATIO (ref 1.1–2)
ALP SERPL-CCNC: 52 UNIT/L (ref 40–150)
ALT SERPL-CCNC: 29 UNIT/L (ref 0–55)
AST SERPL-CCNC: 28 UNIT/L (ref 5–34)
BASOPHILS # BLD AUTO: 0.03 X10(3)/MCL
BASOPHILS NFR BLD AUTO: 0.2 %
BILIRUB SERPL-MCNC: 0.3 MG/DL
BUN SERPL-MCNC: 22.9 MG/DL (ref 8.4–25.7)
CALCIUM SERPL-MCNC: 8.9 MG/DL (ref 8.8–10)
CHLORIDE SERPL-SCNC: 96 MMOL/L (ref 98–107)
CO2 SERPL-SCNC: 31 MMOL/L (ref 23–31)
CREAT SERPL-MCNC: 1.09 MG/DL (ref 0.73–1.18)
EOSINOPHIL # BLD AUTO: 0.05 X10(3)/MCL (ref 0–0.9)
EOSINOPHIL NFR BLD AUTO: 0.4 %
ERYTHROCYTE [DISTWIDTH] IN BLOOD BY AUTOMATED COUNT: 15.3 % (ref 11.5–17)
GFR SERPLBLD CREATININE-BSD FMLA CKD-EPI: >60 MLS/MIN/1.73/M2
GLOBULIN SER-MCNC: 3.3 GM/DL (ref 2.4–3.5)
GLUCOSE SERPL-MCNC: 108 MG/DL (ref 82–115)
HCT VFR BLD AUTO: 30 % (ref 42–52)
HGB BLD-MCNC: 10.1 G/DL (ref 14–18)
IMM GRANULOCYTES # BLD AUTO: 0.06 X10(3)/MCL (ref 0–0.04)
IMM GRANULOCYTES NFR BLD AUTO: 0.5 %
LYMPHOCYTES # BLD AUTO: 2.08 X10(3)/MCL (ref 0.6–4.6)
LYMPHOCYTES NFR BLD AUTO: 16.6 %
MCH RBC QN AUTO: 27.3 PG (ref 27–31)
MCHC RBC AUTO-ENTMCNC: 33.7 G/DL (ref 33–36)
MCV RBC AUTO: 81.1 FL (ref 80–94)
MONOCYTES # BLD AUTO: 0.94 X10(3)/MCL (ref 0.1–1.3)
MONOCYTES NFR BLD AUTO: 7.5 %
NEUTROPHILS # BLD AUTO: 9.37 X10(3)/MCL (ref 2.1–9.2)
NEUTROPHILS NFR BLD AUTO: 74.8 %
NRBC BLD AUTO-RTO: 0 %
PLATELET # BLD AUTO: 463 X10(3)/MCL (ref 130–400)
PMV BLD AUTO: 9.2 FL (ref 7.4–10.4)
POTASSIUM SERPL-SCNC: 4.2 MMOL/L (ref 3.5–5.1)
PROT SERPL-MCNC: 6.2 GM/DL (ref 5.8–7.6)
RBC # BLD AUTO: 3.7 X10(6)/MCL (ref 4.7–6.1)
SODIUM SERPL-SCNC: 138 MMOL/L (ref 136–145)
WBC # SPEC AUTO: 12.53 X10(3)/MCL (ref 4.5–11.5)

## 2023-08-16 PROCEDURE — 37000009 HC ANESTHESIA EA ADD 15 MINS: Performed by: INTERNAL MEDICINE

## 2023-08-16 PROCEDURE — D9220A PRA ANESTHESIA: Mod: CRNA,,, | Performed by: REGISTERED NURSE

## 2023-08-16 PROCEDURE — 25000003 PHARM REV CODE 250: Performed by: INTERNAL MEDICINE

## 2023-08-16 PROCEDURE — 63600175 PHARM REV CODE 636 W HCPCS: Performed by: INTERNAL MEDICINE

## 2023-08-16 PROCEDURE — D9220A PRA ANESTHESIA: Mod: ANES,,, | Performed by: ANESTHESIOLOGY

## 2023-08-16 PROCEDURE — 85025 COMPLETE CBC W/AUTO DIFF WBC: CPT | Performed by: INTERNAL MEDICINE

## 2023-08-16 PROCEDURE — 27201423 OPTIME MED/SURG SUP & DEVICES STERILE SUPPLY: Performed by: INTERNAL MEDICINE

## 2023-08-16 PROCEDURE — 25000003 PHARM REV CODE 250: Performed by: NURSE PRACTITIONER

## 2023-08-16 PROCEDURE — 80053 COMPREHEN METABOLIC PANEL: CPT | Performed by: INTERNAL MEDICINE

## 2023-08-16 PROCEDURE — 21400001 HC TELEMETRY ROOM

## 2023-08-16 PROCEDURE — 45382 COLONOSCOPY W/CONTROL BLEED: CPT | Performed by: INTERNAL MEDICINE

## 2023-08-16 PROCEDURE — D9220A PRA ANESTHESIA: ICD-10-PCS | Mod: ANES,,, | Performed by: ANESTHESIOLOGY

## 2023-08-16 PROCEDURE — D9220A PRA ANESTHESIA: ICD-10-PCS | Mod: CRNA,,, | Performed by: REGISTERED NURSE

## 2023-08-16 PROCEDURE — 37000008 HC ANESTHESIA 1ST 15 MINUTES: Performed by: INTERNAL MEDICINE

## 2023-08-16 PROCEDURE — 25000003 PHARM REV CODE 250: Performed by: REGISTERED NURSE

## 2023-08-16 PROCEDURE — 25000003 PHARM REV CODE 250: Performed by: ANESTHESIOLOGY

## 2023-08-16 RX ORDER — PROPOFOL 10 MG/ML
INJECTION, EMULSION INTRAVENOUS
Status: DISCONTINUED | OUTPATIENT
Start: 2023-08-16 | End: 2023-08-16

## 2023-08-16 RX ORDER — SODIUM CHLORIDE, SODIUM GLUCONATE, SODIUM ACETATE, POTASSIUM CHLORIDE AND MAGNESIUM CHLORIDE 30; 37; 368; 526; 502 MG/100ML; MG/100ML; MG/100ML; MG/100ML; MG/100ML
INJECTION, SOLUTION INTRAVENOUS CONTINUOUS
Status: DISCONTINUED | OUTPATIENT
Start: 2023-08-16 | End: 2023-08-16

## 2023-08-16 RX ORDER — PHENYLEPHRINE HCL IN 0.9% NACL 1 MG/10 ML
SYRINGE (ML) INTRAVENOUS
Status: DISCONTINUED | OUTPATIENT
Start: 2023-08-16 | End: 2023-08-16

## 2023-08-16 RX ORDER — PROPOFOL 10 MG/ML
VIAL (ML) INTRAVENOUS
Status: COMPLETED
Start: 2023-08-16 | End: 2023-08-16

## 2023-08-16 RX ORDER — ONDANSETRON 2 MG/ML
4 INJECTION INTRAMUSCULAR; INTRAVENOUS DAILY PRN
Status: CANCELLED | OUTPATIENT
Start: 2023-08-16

## 2023-08-16 RX ORDER — PHENYLEPHRINE HCL IN 0.9% NACL 1 MG/10 ML
SYRINGE (ML) INTRAVENOUS
Status: COMPLETED
Start: 2023-08-16 | End: 2023-08-16

## 2023-08-16 RX ORDER — LIDOCAINE HYDROCHLORIDE 20 MG/ML
INJECTION, SOLUTION EPIDURAL; INFILTRATION; INTRACAUDAL; PERINEURAL
Status: DISPENSED
Start: 2023-08-16 | End: 2023-08-16

## 2023-08-16 RX ORDER — LIDOCAINE HYDROCHLORIDE 10 MG/ML
1 INJECTION, SOLUTION EPIDURAL; INFILTRATION; INTRACAUDAL; PERINEURAL ONCE
Status: DISCONTINUED | OUTPATIENT
Start: 2023-08-16 | End: 2023-08-18 | Stop reason: HOSPADM

## 2023-08-16 RX ORDER — LIDOCAINE HYDROCHLORIDE 20 MG/ML
INJECTION INTRAVENOUS
Status: DISCONTINUED | OUTPATIENT
Start: 2023-08-16 | End: 2023-08-16

## 2023-08-16 RX ORDER — PROCHLORPERAZINE EDISYLATE 5 MG/ML
5 INJECTION INTRAMUSCULAR; INTRAVENOUS EVERY 30 MIN PRN
Status: CANCELLED | OUTPATIENT
Start: 2023-08-16

## 2023-08-16 RX ORDER — DIPHENHYDRAMINE HYDROCHLORIDE 50 MG/ML
25 INJECTION INTRAMUSCULAR; INTRAVENOUS EVERY 6 HOURS PRN
Status: CANCELLED | OUTPATIENT
Start: 2023-08-16

## 2023-08-16 RX ORDER — SODIUM CHLORIDE 9 MG/ML
0-999 INJECTION, SOLUTION INTRAVENOUS CONTINUOUS
Status: DISCONTINUED | OUTPATIENT
Start: 2023-08-16 | End: 2023-08-16

## 2023-08-16 RX ADMIN — SUCRALFATE 1 G: 1 TABLET ORAL at 08:08

## 2023-08-16 RX ADMIN — PANTOPRAZOLE SODIUM 40 MG: 40 TABLET, DELAYED RELEASE ORAL at 05:08

## 2023-08-16 RX ADMIN — FUROSEMIDE 40 MG: 10 INJECTION, SOLUTION INTRAMUSCULAR; INTRAVENOUS at 06:08

## 2023-08-16 RX ADMIN — PROPOFOL 100 MCG/KG/MIN: 10 INJECTION, EMULSION INTRAVENOUS at 01:08

## 2023-08-16 RX ADMIN — PROPOFOL 20 MG: 10 INJECTION, EMULSION INTRAVENOUS at 01:08

## 2023-08-16 RX ADMIN — FUROSEMIDE 40 MG: 10 INJECTION, SOLUTION INTRAMUSCULAR; INTRAVENOUS at 05:08

## 2023-08-16 RX ADMIN — PROPOFOL 40 MG: 10 INJECTION, EMULSION INTRAVENOUS at 01:08

## 2023-08-16 RX ADMIN — SODIUM CHLORIDE: 9 INJECTION, SOLUTION INTRAVENOUS at 01:08

## 2023-08-16 RX ADMIN — GUAIFENESIN 600 MG: 600 TABLET, EXTENDED RELEASE ORAL at 08:08

## 2023-08-16 RX ADMIN — Medication 100 MCG: at 01:08

## 2023-08-16 RX ADMIN — SODIUM CHLORIDE 20 ML/HR: 9 INJECTION, SOLUTION INTRAVENOUS at 11:08

## 2023-08-16 RX ADMIN — LIDOCAINE HYDROCHLORIDE 60 MG: 20 INJECTION INTRAVENOUS at 01:08

## 2023-08-16 RX ADMIN — SUCRALFATE 1 G: 1 TABLET ORAL at 05:08

## 2023-08-16 NOTE — ANESTHESIA POSTPROCEDURE EVALUATION
Anesthesia Post Evaluation    Patient: Lizandro Martinez    Procedure(s) Performed: Procedure(s) (LRB):  COLON (N/A)  COLONOSCOPY, WITH POLYPECTOMY USING SNARE (N/A)  COLONOSCOPY, WITH HEMORRHAGE CONTROL (N/A)    Final Anesthesia Type: general      Patient location during evaluation: PACU  Patient participation: Yes- Able to Participate  Level of consciousness: awake and alert and oriented  Post-procedure vital signs: reviewed and stable  Pain management: adequate  Airway patency: patent    PONV status at discharge: No PONV  Anesthetic complications: no      Cardiovascular status: hemodynamically stable  Respiratory status: unassisted  Hydration status: euvolemic  Follow-up not needed.          Vitals Value Taken Time   /65 08/16/23 1419   Temp 37 °C (98.6 °F) 08/16/23 1401   Pulse 90 08/16/23 1419   Resp 20 08/16/23 1419   SpO2 95 % 08/16/23 1419         No case tracking events are documented in the log.      Pain/[place] Score: [place] Score: 10 (8/16/2023  2:19 PM)

## 2023-08-16 NOTE — NURSING
Pt returned to room awake and alert with no concerns noted or voiced     Family at bedside times two     Verbal report received from GI lab, see report     Pt started on Full Liquids     No pain reported at this time

## 2023-08-16 NOTE — TRANSFER OF CARE
"Anesthesia Transfer of Care Note    Patient: Lizandro Martinez    Procedure(s) Performed: Procedure(s) (LRB):  COLON (N/A)  COLONOSCOPY, WITH POLYPECTOMY USING SNARE (N/A)  COLONOSCOPY, WITH HEMORRHAGE CONTROL (N/A)    Patient location: GI    Anesthesia Type: general    Transport from OR: Transported from OR on room air with adequate spontaneous ventilation    Post pain: adequate analgesia    Post assessment: no apparent anesthetic complications and tolerated procedure well    Post vital signs: stable    Level of consciousness: responds to stimulation    Nausea/Vomiting: no nausea/vomiting    Complications: none    Transfer of care protocol was followed      Last vitals:   Visit Vitals  BP (!) 96/52 (BP Location: Right arm, Patient Position: Lying)   Pulse 80   Temp 37 °C (98.6 °F) (Skin)   Resp 10   Ht 5' 10" (1.778 m)   Wt 71.2 kg (157 lb)   SpO2 95%   BMI 22.53 kg/m²     "

## 2023-08-16 NOTE — PROVATION PATIENT INSTRUCTIONS
Discharge Summary/Instructions after an Endoscopic Procedure  Patient Name: Lizandro Martinez  Patient MRN: [id number]  Patient YOB: 1935 Wednesday, August 16, 2023  Jennifer Santillan III, MD  Dear patient,  As a result of recent federal legislation (The Federal Cures Act), you may   receive lab or pathology results from your procedure in your [facility]   account before your physician is able to contact you. Your physician or   their representative will relay the results to you with their   recommendations at their soonest availability.  Thank you,  RESTRICTIONS:  During your procedure today, you received medications for sedation.  These   medications may affect your judgment, balance and coordination.  Therefore,   for 24 hours, you have the following restrictions:   - DO NOT drive a car, operate machinery, make legal/financial decisions,   sign important papers or drink alcohol.    ACTIVITY:  Today: no heavy lifting, straining or running due to procedural   sedation/anesthesia.  The following day: return to full activity including work.  DIET:  Eat and drink normally unless instructed otherwise.     TREATMENT FOR COMMON SIDE EFFECTS:  - Mild abdominal pain, nausea, belching, bloating or excessive gas:  rest,   eat lightly and use a heating pad.  - Sore Throat: treat with throat lozenges and/or gargle with warm salt   water.  - Because air was used during the procedure, expelling large amounts of air   from your rectum or belching is normal.  - If a bowel prep was taken, you may not have a bowel movement for 1-3 days.    This is normal.  SYMPTOMS TO WATCH FOR AND REPORT TO YOUR PHYSICIAN:  1. Abdominal pain or bloating, other than gas cramps.  2. Chest pain.  3. Back pain.  4. Signs of infection such as: chills or fever occurring within 24 hours   after the procedure.  5. Rectal bleeding, which would show as bright red, maroon, or black stools.   (A tablespoon of blood from the rectum is not serious,  especially if   hemorrhoids are present.)  6. Vomiting.  7. Weakness or dizziness.  GO DIRECTLY TO THE NEAREST EMERGENCY ROOM IF YOU HAVE ANY OF THE FOLLOWING:      Difficulty breathing              Chills and/or fever over 101 F   Persistent vomiting and/or vomiting blood   Severe abdominal pain   Severe chest pain   Black, tarry stools   Bleeding- more than one tablespoon   Any other symptom or condition that you feel may need urgent attention  Your doctor recommends these additional instructions:  If any biopsies were taken, your doctors clinic will contact you in 1 to 2   weeks with any results.  - Clear liquid diet.   - Return patient to hospital [facility] for ongoing care.  For questions, problems or results please call your physician - Jennifer Santillan III, MD at Work:  [phone].  [facility], EMERGENCY ROOM PHONE NUMBER: [phone]  IF A COMPLICATION OR EMERGENCY SITUATION ARISES AND YOU ARE UNABLE TO REACH   YOUR PHYSICIAN - GO DIRECTLY TO THE EMERGENCY ROOM.  Jennifer Santillan III, MD  8/16/2023 2:00:14 PM  This report has been verified and signed electronically.  Dear patient,  As a result of recent federal legislation (The Federal Cures Act), you may   receive lab or pathology results from your procedure in your [facility]   account before your physician is able to contact you. Your physician or   their representative will relay the results to you with their   recommendations at their soonest availability.  Thank you,  PROVATION

## 2023-08-16 NOTE — ANESTHESIA PREPROCEDURE EVALUATION
08/16/2023  Lizandro Martinez is a 88 y.o., male.    Pre-op Diagnosis: * No pre-op diagnosis entered *    Procedure(s):  COLON  COLONOSCOPY, WITH POLYPECTOMY USING SNARE   Assessment/Plan:  88 y.o. h/o atrial fibrillation (on Xarelto), heart failure with reduced ejection fraction (most recent 35%), hypertension, dyslipidemia who presented to the ED with dyspnea and dark stools. GI consulted earlier this admit with no indications for inpatient endoscopy- dark stools had resolved with stable hgb.   We are now being called back for persistent bleeding.      Hematochezia- melena earlier on admit. Now BRB.   - could likely be r/t hx hemorrhoids in setting of Xarelto as hgb has remained stable  - clear liquids today and will discuss need for colonoscopy tomorrow for thorough eval- will confirm with MD.   2. New onset afib - on Xarelto, currently held   3. HF with reduced EF, most recent 35%; cardiology with plans for outpatient workup     Review of patient's allergies indicates:   Allergen Reactions    Colestipol     Meloxicam     Rosuvastatin     Simvastatin     Statins-hmg-coa reductase inhibitors     Tramadol        Current Outpatient Medications   Medication Instructions    aspirin 81 mg, Oral, Daily    fluticasone furoate-vilanteroL (BREO) 100-25 mcg/dose diskus inhaler 1 puff, Inhalation, Daily, Controller    furosemide (LASIX) 40 mg, Oral, 2 times daily    guaiFENesin 100 mg/5 ml (ROBITUSSIN) 200 mg, Oral, 4 times daily PRN    hawthorn 500 mg Cap 1 capsule, Oral    levalbuterol (XOPENEX) 0.63 mg, Nebulization, Every 8 hours, Rescue    metoprolol succinate (TOPROL-XL) 75 mg, Oral, Daily    pantoprazole (PROTONIX) 40 mg, Oral, 2 times daily before meals    predniSONE (DELTASONE) 20 mg, Oral, Daily    rivaroxaban (XARELTO) 20 mg, Oral, With dinner    sucralfate (CARAFATE) 1 g, Oral, Before meals  & nightly    valsartan (DIOVAN) 40 mg, Oral, Daily       Past Medical History:   Diagnosis Date    A-fib     CHF (congestive heart failure)     Chronic pain     Hypercholesterolemia        Past Surgical History:   Procedure Laterality Date    FRACTURE SURGERY      TIBIA FRACTURE SURGERY Right      Lab Results   Component Value Date    WBC 12.53 (H) 08/16/2023    HGB 10.1 (L) 08/16/2023    HCT 30.0 (L) 08/16/2023    MCV 81.1 08/16/2023     (H) 08/16/2023   BMP  Lab Results   Component Value Date     08/16/2023    K 4.2 08/16/2023    CO2 31 08/16/2023    BUN 22.9 08/16/2023    CREATININE 1.09 08/16/2023    CALCIUM 8.9 08/16/2023          TTE 08/12/23  Interpretation Summary    Left Ventricle: The left ventricle is normal in size. Mildly increased wall thickness. Moderate global hypokinesis present. There is moderately reduced systolic function. Ejection fraction by visual approximation is 35%.    Left Atrium: Left atrium is severely dilated.    Right Ventricle: Mild right ventricular enlargement. Systolic function is mildly reduced.    Right Atrium: Right atrium is moderately dilated.    Aortic Valve: There is mild aortic valve sclerosis. There is mild aortic regurgitation.    Mitral Valve: There is no stenosis. There is mild regurgitation.    Tricuspid Valve: There is mild regurgitation.    Pulmonic Valve: There is no significant stenosis. There is mild regurgitation.    Pericardium: Left pleural effusion.      CXR FINDINGS:  Similar cardiomegaly and pulmonary vascular congestion.  More confluent opacity in the medial left lung base.  No significant pleural fluid or pneumothorax.     Impression: Similar cardiomegaly and pulmonary vascular congestion.     There continues to be suspected opacity in the left lung base, atelectasis, infiltrate or mild pulmonary edema.     Electronically signed by: Ricardo Abbott  08/10/2023 14:30    Pre-op Assessment    I have reviewed the Patient Summary  Reports.    I have reviewed the NPO Status.   I have reviewed the Medications.     Review of Systems  Anesthesia Hx:  No problems with previous Anesthesia  Denies Family Hx of Anesthesia complications.   Denies Personal Hx of Anesthesia complications.   Social:  Non-Smoker    Cardiovascular:   Exercise tolerance: good Dysrhythmias atrial fibrillation Denies Angina. CHF  Denies Orthopnea.  Denies PND. hyperlipidemia  Denies ACE.  Functional Capacity good / => 4 METS    Musculoskeletal:  Musculoskeletal Normal    Neurological:   Denies TIA. Denies CVA.    Psych:  Psychiatric Normal           Physical Exam  General: Well nourished, Alert and Oriented    Airway:  Mallampati: III   Mouth Opening: Normal  TM Distance: Normal  Tongue: Normal  Neck ROM: Normal ROM    Dental:  Intact    Chest/Lungs:  Clear to auscultation    Heart:  Rate: Normal  Rhythm: Regular Rhythm  No pretibial edema  No carotid bruits      Anesthesia Plan  Type of Anesthesia, risks & benefits discussed:    Anesthesia Type: Gen Natural Airway  Intra-op Monitoring Plan: Standard ASA Monitors  Post Op Pain Control Plan: IV/PO Opioids PRN  Induction:  IV  Informed Consent: Informed consent signed with the Patient and all parties understand the risks and agree with anesthesia plan.  All questions answered. Patient consented to blood products? No  ASA Score: 3  Day of Surgery Review of History & Physical: H&P Update referred to the surgeon/provider.  Anesthesia Plan Notes: GA TIVA    Ready For Surgery From Anesthesia Perspective.     .

## 2023-08-16 NOTE — PROGRESS NOTES
\[facility]  Hospital Medicine Progress Note        Chief Complaint: Inpatient Follow-up    HPI:   88-year-old male with significant history of PAF on Xarelto, systolic heart failure with recent echocardiogram showing ejection fraction-45-50%, HTN, HLD, medical noncompliance, heavy tobacco use .  patient was recently started on Xarelto for thromboembolic prophylaxis for AFib.  He had a recent hospitalization from 08/04 to 8/6 for acute decompensated heart failure with AFib and was discharged on diuretics, Xarelto and metoprolol.  Patient presented back to the ED with complaints of worsening shortness of breath, nonproductive cough and also melanotic stools.  cough is chronic for the past several months, but lately has been worsening.  Patient initially presented to outlying facility ED and was prescribed azithromycin, Lasix and was discharged home.  but presented back to the ED given persistent complaints.  Patient was hemodynamically stable.  Lab significant for anemia with hemoglobin-11, INR slightly elevated.  Patient initiated on IV Lasix, IV Protonix and was admitted to hospitalist medicine service.  Patient was previously heavy smoker, quit 40 years back.  GI services consulted for GI bleed.  Xarelto held.  CT chest in the ED showed bilateral pleural effusion, cardiomegaly and small pericardial effusion, possible interstitial lung disease.  Did have expiratory wheezes.  Initiated bronchodilators.  Also initiated low-dose prednisone.  Repeat echocardiogram ordered on 08/20 given concern for pericardial effusion is CT.  No pericardial effusion, but EF dropped to 35% .  Decided to consult Cardiology.  Cardiology recommended to continue medical management, plan for ischemic evaluation as outpatient.  GI evaluated, since hemoglobin was stable no plans for endoscopic evaluation, cleared for anticoagulation.  Xarelto initiated on 8/13.  Hemoglobin slightly dropped on 08/14 patient had  couple of episodes of dark bowel movements.  But became lighter later.  Patient had prakash hematochezia on 08/15.  Hemoglobin still more than 10.  GI reconsulted.  Aspirin, Xarelto held.  GI planning for colonoscopy on 08/16.      Interval Hx:     Patient seen at bedside.  Patient did not have any more episodes of hematochezia since yesterday.  Hemodynamics stable.  No new complaints.  He was sleeping.  Family at bedside, reports he was tired after the prep      Objective/physical exam:  General: In no acute distress, afebrile  Chest: Clear to auscultation bilaterally  Heart: S1, S2, no appreciable murmur  Abdomen: Soft, nontender, BS +  MSK: Warm, no lower extremity edema, no clubbing or cyanosis  Neurologic:  Sleeping  VITAL SIGNS: 24 HRS MIN & MAX LAST   Temp  Min: 97.6 °F (36.4 °C)  Max: 98.5 °F (36.9 °C) 97.9 °F (36.6 °C)   BP  Min: 111/69  Max: 136/66 121/72   Pulse  Min: 73  Max: 93  78   Resp  Min: 16  Max: 20 16   SpO2  Min: 93 %  Max: 98 % 98 %       Recent Labs   Lab 08/16/23  0408   WBC 12.53*   RBC 3.70*   HGB 10.1*   HCT 30.0*   MCV 81.1   MCH 27.3   MCHC 33.7   RDW 15.3   *   MPV 9.2         Recent Labs   Lab 08/12/23  0420 08/13/23  0348 08/16/23  0407   *   < > 138   K 3.9   < > 4.2   CO2 28   < > 31   BUN 14.1   < > 22.9   CREATININE 1.03   < > 1.09   CALCIUM 8.3*   < > 8.9   MG 2.10  --   --    ALBUMIN 2.8*   < > 2.9*   ALKPHOS 54   < > 52   ALT 26   < > 29   AST 19   < > 28   BILITOT 0.6   < > 0.3    < > = values in this interval not displayed.          Microbiology Results (last 7 days)       ** No results found for the last 168 hours. **             Scheduled Med:   fluticasone furoate-vilanteroL  1 puff Inhalation Daily    furosemide (LASIX) injection  40 mg Intravenous Q12H    guaiFENesin  600 mg Oral BID    metoprolol succinate  75 mg Oral Daily    pantoprazole  40 mg Oral BID AC    predniSONE  20 mg Oral Daily    sucralfate  1 g Oral QID (AC & HS)    valsartan  40 mg Oral Daily           Assessment/Plan:    Acute decompensated systolic heart failure with ejection fraction-35%   Bilateral pleural effusion, left more than right secondary to above   Chronic interstitial lung disease with mild acute exacerbation  New onset hematochezia on 08/15   Colonic angiodysplasia leading to above status post APC  Mild acute blood loss anemia   Recent diagnosis AFib on Xarelto   History of heavy tobacco use  Essential HTN-stable   HLD  Medical noncompliance  Prophylaxis    Continue IV diuresis with Lasix 40 mg b.i.d. for decompensated heart failure  On beta-blocker,   Cardiology evaluated, planning for outpatient ischemic evaluation  Hematochezia evaluated with colonoscopy today   Revealed colonic angiodysplasia, treated with APC  High risk for bleeding  Xarelto, aspirin held since 8/50   I will re-consult Cardiology regarding need for antithrombotics  Might have to consider Watchman device procedure as outpatient  Hemoglobin is more than 10  Continue PPI, Carafate  Concern for interstitial lung disease per CT, possible COPD/emphysema  Mild exacerbation noted   P.o. prednisone 20 mg daily x5 days -day 5 today  On bronchodilators  Will arrange outpatient pulmonology appointment  Mild leukocytosis likely steroid induced  DVT prophylaxis-Xarelto resumed    If hemoglobin stable and is cleared by GI, Cardiology patient can go home tomorrow   Await Cardiology recommendations as far as antithrombotics  Kaylin Mohan MD   08/16/2023

## 2023-08-17 LAB
ALBUMIN SERPL-MCNC: 2.9 G/DL (ref 3.4–4.8)
ALBUMIN/GLOB SERPL: 0.9 RATIO (ref 1.1–2)
ALP SERPL-CCNC: 54 UNIT/L (ref 40–150)
ALT SERPL-CCNC: 31 UNIT/L (ref 0–55)
AST SERPL-CCNC: 26 UNIT/L (ref 5–34)
BASOPHILS # BLD AUTO: 0.07 X10(3)/MCL
BASOPHILS NFR BLD AUTO: 0.6 %
BILIRUB SERPL-MCNC: 0.4 MG/DL
BUN SERPL-MCNC: 20.8 MG/DL (ref 8.4–25.7)
CALCIUM SERPL-MCNC: 8.8 MG/DL (ref 8.8–10)
CHLORIDE SERPL-SCNC: 96 MMOL/L (ref 98–107)
CO2 SERPL-SCNC: 31 MMOL/L (ref 23–31)
CREAT SERPL-MCNC: 1.1 MG/DL (ref 0.73–1.18)
EOSINOPHIL # BLD AUTO: 0.13 X10(3)/MCL (ref 0–0.9)
EOSINOPHIL NFR BLD AUTO: 1.2 %
ERYTHROCYTE [DISTWIDTH] IN BLOOD BY AUTOMATED COUNT: 15.4 % (ref 11.5–17)
GFR SERPLBLD CREATININE-BSD FMLA CKD-EPI: >60 MLS/MIN/1.73/M2
GLOBULIN SER-MCNC: 3.2 GM/DL (ref 2.4–3.5)
GLUCOSE SERPL-MCNC: 114 MG/DL (ref 82–115)
HCT VFR BLD AUTO: 33.5 % (ref 42–52)
HGB BLD-MCNC: 11 G/DL (ref 14–18)
IMM GRANULOCYTES # BLD AUTO: 0.07 X10(3)/MCL (ref 0–0.04)
IMM GRANULOCYTES NFR BLD AUTO: 0.6 %
LYMPHOCYTES # BLD AUTO: 1.91 X10(3)/MCL (ref 0.6–4.6)
LYMPHOCYTES NFR BLD AUTO: 17.3 %
MCH RBC QN AUTO: 26.8 PG (ref 27–31)
MCHC RBC AUTO-ENTMCNC: 32.8 G/DL (ref 33–36)
MCV RBC AUTO: 81.7 FL (ref 80–94)
MONOCYTES # BLD AUTO: 1.03 X10(3)/MCL (ref 0.1–1.3)
MONOCYTES NFR BLD AUTO: 9.4 %
NEUTROPHILS # BLD AUTO: 7.8 X10(3)/MCL (ref 2.1–9.2)
NEUTROPHILS NFR BLD AUTO: 70.9 %
NRBC BLD AUTO-RTO: 0 %
PLATELET # BLD AUTO: 466 X10(3)/MCL (ref 130–400)
PMV BLD AUTO: 8.7 FL (ref 7.4–10.4)
POTASSIUM SERPL-SCNC: 4.1 MMOL/L (ref 3.5–5.1)
PROT SERPL-MCNC: 6.1 GM/DL (ref 5.8–7.6)
RBC # BLD AUTO: 4.1 X10(6)/MCL (ref 4.7–6.1)
SODIUM SERPL-SCNC: 138 MMOL/L (ref 136–145)
WBC # SPEC AUTO: 11.01 X10(3)/MCL (ref 4.5–11.5)

## 2023-08-17 PROCEDURE — 25000242 PHARM REV CODE 250 ALT 637 W/ HCPCS: Performed by: NURSE PRACTITIONER

## 2023-08-17 PROCEDURE — 63600175 PHARM REV CODE 636 W HCPCS: Performed by: INTERNAL MEDICINE

## 2023-08-17 PROCEDURE — 21400001 HC TELEMETRY ROOM

## 2023-08-17 PROCEDURE — 85025 COMPLETE CBC W/AUTO DIFF WBC: CPT | Performed by: INTERNAL MEDICINE

## 2023-08-17 PROCEDURE — 25000003 PHARM REV CODE 250: Performed by: INTERNAL MEDICINE

## 2023-08-17 PROCEDURE — 25000003 PHARM REV CODE 250: Performed by: NURSE PRACTITIONER

## 2023-08-17 PROCEDURE — 80053 COMPREHEN METABOLIC PANEL: CPT | Performed by: INTERNAL MEDICINE

## 2023-08-17 RX ADMIN — GUAIFENESIN 600 MG: 600 TABLET, EXTENDED RELEASE ORAL at 10:08

## 2023-08-17 RX ADMIN — GUAIFENESIN 600 MG: 600 TABLET, EXTENDED RELEASE ORAL at 08:08

## 2023-08-17 RX ADMIN — PANTOPRAZOLE SODIUM 40 MG: 40 TABLET, DELAYED RELEASE ORAL at 07:08

## 2023-08-17 RX ADMIN — SUCRALFATE 1 G: 1 TABLET ORAL at 07:08

## 2023-08-17 RX ADMIN — FLUTICASONE FUROATE AND VILANTEROL TRIFENATATE 1 PUFF: 100; 25 POWDER RESPIRATORY (INHALATION) at 11:08

## 2023-08-17 RX ADMIN — VALSARTAN 40 MG: 40 TABLET, FILM COATED ORAL at 10:08

## 2023-08-17 RX ADMIN — SUCRALFATE 1 G: 1 TABLET ORAL at 04:08

## 2023-08-17 RX ADMIN — METOPROLOL SUCCINATE 75 MG: 50 TABLET, EXTENDED RELEASE ORAL at 10:08

## 2023-08-17 RX ADMIN — PANTOPRAZOLE SODIUM 40 MG: 40 TABLET, DELAYED RELEASE ORAL at 04:08

## 2023-08-17 RX ADMIN — SUCRALFATE 1 G: 1 TABLET ORAL at 08:08

## 2023-08-17 RX ADMIN — FUROSEMIDE 40 MG: 10 INJECTION, SOLUTION INTRAMUSCULAR; INTRAVENOUS at 05:08

## 2023-08-17 RX ADMIN — PREDNISONE 20 MG: 20 TABLET ORAL at 10:08

## 2023-08-17 RX ADMIN — SUCRALFATE 1 G: 1 TABLET ORAL at 11:08

## 2023-08-17 RX ADMIN — FUROSEMIDE 40 MG: 10 INJECTION, SOLUTION INTRAMUSCULAR; INTRAVENOUS at 06:08

## 2023-08-17 NOTE — CONSULTS
Inpatient Nutrition Assessment    Admit Date: 8/11/2023   Total duration of encounter: 6 days     Nutrition Recommendation/Prescription     Resume Heart Healthy Diet once medically feasible.     Continue vanilla & strawberry Boost VHC BID. 530 kcals and 22 g protein per serving.     Monitor po intake, weight, labs.     Communication of Recommendations: reviewed with nurse and reviewed with patient    Nutrition Assessment     Malnutrition Assessment/Nutrition-Focused Physical Exam    Malnutrition Context: chronic illness  Malnutrition Level: moderate  Energy Intake (Malnutrition): less than or equal to 75% for greater than or equal to 1 month  Weight Loss (Malnutrition): 20% in 1 year  Subcutaneous Fat (Malnutrition): mild depletion  Orbital Region (Subcutaneous Fat Loss): mild depletion  Upper Arm Region (Subcutaneous Fat Loss): mild depletion     Muscle Mass (Malnutrition): moderate depletion  [place] Region (Muscle Loss): mild depletion     Clavicle and Acromion Bone Region (Muscle Loss): moderate depletion     Dorsal Hand (Muscle Loss): mild depletion  Patellar Region (Muscle Loss): moderate depletion     Posterior Calf Region (Muscle Loss): moderate depletion           A minimum of two characteristics is recommended for diagnosis of either severe or non-severe malnutrition.    Chart Review    Reason Seen: physician consult for weight loss and follow-up    Malnutrition Screening Tool Results   Have you recently lost weight without trying?: Yes: 2-13 lbs  Have you been eating poorly because of a decreased appetite?: No   MST Score: 1     Diagnosis:  Melena, concerning for upper GI bleed in setting of recent OAC intiation  Acute on chronic normocytic anemia  Coagulopathy  Decompensated systolic HF (LVEF 45-50%)  Chronic Cough with concerns for possible CAP- LLL  Afib, CVR on Xarelto  Essential HTN  ETOH Use      Relevant Medical History:   Atrial fibrillation, on Xarelto  Systolic heart failure (LVEF 45-50% on  08/04/2023)  Essential hypertension  Hyperlipidemia/statin allergy  History of medical noncompliance  Former heavy tobacco use    Past Surgical History:   Procedure Laterality Date    COLONOSCOPY N/A 8/16/2023    Procedure: COLON;  Surgeon: Jennifer Santillan III, MD;  Location: [facility] ENDOSCOPY;  Service: Gastroenterology;  Laterality: N/A;    COLONOSCOPY N/A 8/16/2023    Procedure: COLONOSCOPY, WITH HEMORRHAGE CONTROL;  Surgeon: Jennifer Santillan III, MD;  Location: [facility] ENDOSCOPY;  Service: Gastroenterology;  Laterality: N/A;    COLONOSCOPY, WITH POLYPECTOMY USING SNARE N/A 8/16/2023    Procedure: COLONOSCOPY, WITH POLYPECTOMY USING SNARE;  Surgeon: Jennifer Santillan III, MD;  Location: [facility] ENDOSCOPY;  Service: Gastroenterology;  Laterality: N/A;    FRACTURE SURGERY      TIBIA FRACTURE SURGERY Right      Review of patient's allergies indicates:   Allergen Reactions    Colestipol     Meloxicam     Rosuvastatin     Simvastatin     Statins-hmg-coa reductase inhibitors     Tramadol         Nutrition-Related Medications:    fluticasone furoate-vilanteroL  1 puff Inhalation Daily    furosemide (LASIX) injection  40 mg Intravenous Q12H    guaiFENesin  600 mg Oral BID    LIDOcaine (PF) 10 mg/ml (1%)  1 mL Intradermal Once    metoprolol succinate  75 mg Oral Daily    pantoprazole  40 mg Oral BID AC    predniSONE  20 mg Oral Daily    sucralfate  1 g Oral QID (AC & HS)    valsartan  40 mg Oral Daily         acetaminophen, acetaminophen, aluminum-magnesium hydroxide-simethicone, guaiFENesin 100 mg/5 ml, levalbuterol, melatonin, metoprolol, ondansetron, polyethylene glycol, prochlorperazine, senna-docusate 8.6-50 mg, sodium chloride 0.9%   Calorie Containing IV Medications: no significant kcals from medications at this time    Nutrition-Related Labs:  Hemoglobin A1c   Date Value Ref Range Status   08/12/2023 6.6 <=7.0 % Final     8/12/2023: Magnesium Level 2.10 mg/dL (Ref range: 1.60 - 2.60 mg/dL)  8/17/2023: Potassium  "Level 4.1 mmol/L (Ref range: 3.5 - 5.1 mmol/L); Sodium Level 138 mmol/L (Ref range: 136 - 145 mmol/L)   Recent Labs   Lab 08/15/23  0421 08/16/23  0408 08/17/23  0349   WBC 12.99* 12.53* 11.01   RBC 3.89* 3.70* 4.10*   HGB 10.6* 10.1* 11.0*   HCT 32.1* 30.0* 33.5*   MCV 82.5 81.1 81.7   MCH 27.2 27.3 26.8*   MCHC 33.0 33.7 32.8*       Recent Labs   Lab 08/12/23  0420 08/13/23  0348 08/15/23  0421 08/16/23  0407 08/17/23  0349   *   < > 137 138 138   K 3.9   < > 4.6 4.2 4.1   CO2 28   < > 28 31 31   BUN 14.1   < > 21.6 22.9 20.8   CREATININE 1.03   < > 0.90 1.09 1.10   CALCIUM 8.3*   < > 8.8 8.9 8.8   MG 2.10  --   --   --   --    ALBUMIN 2.8*   < > 2.9* 2.9* 2.9*   ALKPHOS 54   < > 51 52 54   ALT 26   < > 21 29 31   AST 19   < > 23 28 26   BILITOT 0.6   < > 0.3 0.3 0.4    < > = values in this interval not displayed.         Diet/PN Order: Diet full liquid  Oral Supplement Order: none  Tube Feeding Order: none  Appetite/Oral Intake: good/% of meals  Factors Affecting Nutritional Intake: none identified  Food/Presybeterian/Cultural Preferences: none reported  Food Allergies: none reported       Wound(s):   n/a    Comments    8/13/23: Pt reports good appetite now that he is on solid food, disliked liquids, chews/swallows well, no n/v/d, no bm x 3 days - abnormal for pt, lost a lot of weight in November of last year due to illness, appetite has not been the same since and he has not been able to gain the weight back. Prior to that, UBW was 195 lbs, standing scale today showed 157 lbs, pt said standing scale at home showed 140 lbs - could be due to fluid, pt w/ some fluid retention and is on lasix. PO intake encouraged, pt agrees to vanilla or strawberry ONS.     8/17/23: Per RN, pt is tolerating full liquid diet well; anticipating possible d/c later today.     Anthropometrics    Height: 5' 10" (177.8 cm)    Last Weight: 71.2 kg (157 lb) (08/13/23 1252) Weight Method: Standard Scale  BMI (Calculated): 22.5  BMI " Classification: normal (BMI 18.5-24.9)        Ideal Body Weight (IBW), Male: 166 lb     % Ideal Body Weight, Male (lb): 105.42 %                          Usual Weight Provided By: patient and family/caregiver  : 195 lbs  Wt Readings from Last 5 Encounters:   23 71.2 kg (157 lb)   08/10/23 79.4 kg (175 lb)   23 79.4 kg (175 lb 0.7 oz)   23 77.1 kg (170 lb)   23 72.6 kg (160 lb)     Weight Change(s) Since Admission:  Admit Weight: 79.4 kg (175 lb) (23 1235)  23: 157 lbs standing scale RD   23: no new wt noted     Estimated Needs    Weight Used For Calorie Calculations: 71.2 kg (156 lb 15.5 oz)  Energy Calorie Requirements (kcal): 0116-8755 kcals (25-30 kcals/kg)  Energy Need Method: Kcal/kg  Weight Used For Protein Calculations: 71.2 kg (156 lb 15.5 oz)  Protein Requirements: 107 g (1.5 g/kg)  Fluid Requirements (mL): 4048-6339 mL (1 mL/kcal)  Temp (24hrs), Av.1 °F (36.7 °C), Min:97.3 °F (36.3 °C), Max:98.6 °F (37 °C)       Enteral Nutrition    Patient not receiving enteral nutrition at this time.    Parenteral Nutrition    Patient not receiving parenteral nutrition support at this time.    Evaluation of Received Nutrient Intake    Calories: not meeting estimated needs   Protein: not meeting estimated needs    Patient Education    Not applicable.     Nutrition Diagnosis     PES: Malnutrition related to catabolic illness and suboptimal protein/energy intake as evidenced by mild fat depletion, moderate muscle depletion, and 20% weight loss in 1 year 75% po intake for 1 month or greater, in the context of COPD. (continues)     Interventions/Goals     Intervention(s): general/healthful diet and commercial beverage  Goal: Meet greater than 75% of nutritional needs by follow-up. (goal progressing)    Monitoring & Evaluation     Dietitian will monitor food and beverage intake, weight, and electrolyte/renal panel.  Nutrition Risk/Follow-Up: moderate (follow-up in 3-5 days)    Please consult if re-assessment needed sooner.    Vivienne Falk RD   08/17/2023

## 2023-08-17 NOTE — PROGRESS NOTES
[facility]  Hospital Medicine Progress Note        Chief Complaint: Inpatient Follow-up for     HPI:   88-year-old male with significant history of PAF on Xarelto, systolic heart failure with recent echocardiogram showing ejection fraction-45-50%, HTN, HLD, medical noncompliance, heavy tobacco use .  patient was recently started on Xarelto for thromboembolic prophylaxis for AFib.  He had a recent hospitalization from 08/04 to 8/6 for acute decompensated heart failure with AFib and was discharged on diuretics, Xarelto and metoprolol.  Patient presented back to the ED with complaints of worsening shortness of breath, nonproductive cough and also melanotic stools.  cough is chronic for the past several months, but lately has been worsening.  Patient initially presented to outlying facility ED and was prescribed azithromycin, Lasix and was discharged home.  but presented back to the ED given persistent complaints.  Patient was hemodynamically stable.  Lab significant for anemia with hemoglobin-11, INR slightly elevated.  Patient initiated on IV Lasix, IV Protonix and was admitted to hospitalist medicine service.  Patient was previously heavy smoker, quit 40 years back.  GI services consulted for GI bleed.  Xarelto held.  CT chest in the ED showed bilateral pleural effusion, cardiomegaly and small pericardial effusion, possible interstitial lung disease.  Did have expiratory wheezes.  Initiated bronchodilators.  Also initiated low-dose prednisone.  Repeat echocardiogram ordered on 08/20 given concern for pericardial effusion is CT.  No pericardial effusion, but EF dropped to 35% .  Decided to consult Cardiology.  Cardiology recommended to continue medical management, plan for ischemic evaluation as outpatient.  GI evaluated, since hemoglobin was stable no plans for endoscopic evaluation, cleared for anticoagulation.  Xarelto initiated on 8/13.  Hemoglobin slightly dropped on 08/14 patient  had couple of episodes of dark bowel movements.  But became lighter later.  Patient had prakash hematochezia on 08/15.  Hemoglobin still more than 10.  GI reconsulted.  Aspirin, Xarelto held.  GI planning for colonoscopy on 08/16.      Interval Hx:   Afebrile.  Hemodynamically stable.  He was alert, oriented, comfortably resting.  Son was at bedside.  Morning labs revealing stable hemoglobin 11.  Had bowel movement with no blood. .  Pedal edema has resolved.  y.    Colonoscopy yesterday 08/16/2023 revealed to medium-sized angiodysplastic lesions with stigmata of recent bleeding in the ascending colon and cecum which was treated with argon plasma coagulation.  Diverticulosis was seen in the entire colon.      Objective/physical exam:  Vitals:    08/17/23 1005 08/17/23 1123 08/17/23 1124 08/17/23 1142   BP: 112/70 120/72     Pulse: 103 87  87   Resp:   18 18   Temp:  98.1 °F (36.7 °C)     TempSrc:  Oral     SpO2:  95%  95%   Weight:       Height:         General: In no acute distress, afebrile  Respiratory: Clear to auscultation bilaterally  Cardiovascular: S1, S2, no appreciable murmur  Abdomen: Soft, nontender, BS +  MSK: Warm, no lower extremity edema, no clubbing or cyanosis  Neurologic: Alert and oriented x4, moving all extremities with good strength     Lab Results   Component Value Date     08/17/2023    K 4.1 08/17/2023    CO2 31 08/17/2023    BUN 20.8 08/17/2023    CREATININE 1.10 08/17/2023    CALCIUM 8.8 08/17/2023      Lab Results   Component Value Date    ALT 31 08/17/2023    AST 26 08/17/2023    ALKPHOS 54 08/17/2023    BILITOT 0.4 08/17/2023      Lab Results   Component Value Date    WBC 11.01 08/17/2023    HGB 11.0 (L) 08/17/2023    HCT 33.5 (L) 08/17/2023    MCV 81.7 08/17/2023     (H) 08/17/2023           Medications:   fluticasone furoate-vilanteroL  1 puff Inhalation Daily    furosemide (LASIX) injection  40 mg Intravenous Q12H    guaiFENesin  600 mg Oral BID    LIDOcaine (PF) 10 mg/ml  (1%)  1 mL Intradermal Once    metoprolol succinate  75 mg Oral Daily    pantoprazole  40 mg Oral BID AC    predniSONE  20 mg Oral Daily    sucralfate  1 g Oral QID (AC & HS)    valsartan  40 mg Oral Daily      acetaminophen, acetaminophen, aluminum-magnesium hydroxide-simethicone, guaiFENesin 100 mg/5 ml, levalbuterol, melatonin, metoprolol, ondansetron, polyethylene glycol, prochlorperazine, senna-docusate 8.6-50 mg, sodium chloride 0.9%     Assessment/Plan:    Acute decompensated systolic heart failure with EF 35%-improving  Bilateral pleural effusion due to above-resolving  Hematochezia due to angiodysplastic lesion of colon s/p APC 08/16/2023  Acute blood loss ammonia due to above-stable   Recent diagnosis of AFib on Xarelto-currently on hold  Diverticulosis    HX: HTN, HLD, medication noncompliance, tobacco use,?  On diagnosed COPD/emphysema    Plan:  -cardiology following, adjusting G DMT.  Continue telemetry and electrolyte monitoring while inpatient   -needs outpatient Watchman procedure the setting of poor candidacy for anticoagulation  -aspirin and Xarelto held since 08/15/2023.  Resume when cleared from Cardiology and GI standpoint  -continue bronchodilator therapy while inpatient.  Agree with Pulmonary follow-up for evaluation of COPD  -other home medications were reviewed and renewed.  Counseled on smoking cessation.    SCDs for now, Xarelto when cleared from GI    Kemar Goldman MD

## 2023-08-17 NOTE — NURSING
Per GI team, pt okay to resume xarelto. Per cardiology, wait to verify morning H/H is stable before resuming, then pt can be d/c tomorrow.

## 2023-08-17 NOTE — CONSULTS
[facility] - 8th Floor Med Surg    Cardiology  Progress Note    Patient Name: Lizandro Martinez  MRN: [id number]  Admission Date: 8/11/2023  Hospital Length of Stay: 6 days  Code Status: Full Code   Attending Provider: Kaylin Mohan MD   Consulting Provider: Kelby Perez MD  [facility]  Principal Problem:GI bleed    Patient information was obtained from patient, past medical records, ER records, and primary team.     Subjective:   Consultation Reason: Heart Failure/LV Function Reduced    HPI:   Mr. Martinez is a 88 year old male, known to Dr. CIS through previous hospitalization (Dr. Skelton), who presented to the hospital with dark stools and worsening shortness of breath. Patient was recently evaluated in the hospital on 8.4.23-8.6.23 where [facility]IS evaluated the patient for AF and HF. He was diuresed and placed on Xarelto and beta blockade and discharged home, to return with above stated symptoms. On 8.5.23 EF 45-50%, repeat Echocardiogram on 8.12.23 EF noted to be 35%. Hemodynamically, patient stable. BNP noted to be 439, 366. Troponin values noted to be normal.   CT Imaging revealed widespread chronic lung parenchymal changes without evidence of acute pulmonary process & Small volume pericardial and bilateral pleural effusions. CIS is re-consulted to give recommendations on anticoagulation.    Hospital Course:  08.14.23: Patient sitting up at side of bed. NAD. VSS. Denies any complaints.   8.17.23: Needs anticoagulation recs. Underwent colonoscopy yesterday; 2 areas cauterized. Pt denied any persistent bleeding or dark stools. VSS, H/H stable.       PMH: AF (Xarelto), LVSD EF 45-50%, Hypertension, Hyperlipidemia, Chronic Pain, History of Medical Noncompliance, Nicotine Dependence  PSH: Fracture Surgery  Family History: Mother- Hypertension/Hyperlipidemia/Cancer, Father- Dementia  Social History: Tobacco- Heavy Smoker/Active Use, Alcohol- Daily Use/6 Pack Beer Daily,  Substance Abuse- Negative    Previous Cardiac Diagnostics:   CT Chest with/without Contrast (8.12.23):  IMPRESSION  Widespread chronic lung parenchymal changes without evidence of acute pulmonary process.  Small volume pericardial and bilateral pleural effusions.  Chronic sequela of prior granulomatous disease.  Additional chronic secondary details discussed above.    Echocardiogram (8.12.23):  Left Ventricle: The left ventricle is normal in size. Mildly increased wall thickness. Moderate global hypokinesis present. There is moderately reduced systolic function. Ejection fraction by visual approximation is 35%.  Left Atrium: Left atrium is severely dilated.  Right Ventricle: Mild right ventricular enlargement. Systolic function is mildly reduced.  Right Atrium: Right atrium is moderately dilated.  Aortic Valve: There is mild aortic valve sclerosis. There is mild aortic regurgitation.  Mitral Valve: There is no stenosis. There is mild regurgitation.  Tricuspid Valve: There is mild regurgitation.  Pulmonic Valve: There is no significant stenosis. There is mild regurgitation.  Pericardium: Left pleural effusion.    Echocardiogram (8.5.23):  Left Ventricle: The left ventricle is mildly dilated. Normal wall thickness. Normal wall motion. There is mildly reduced systolic function with a visually estimated ejection fraction of 45 - 50%. Unable to assess diastolic function due to arrhythmia. Elevated left ventricular filling pressure.  Left Atrium: Left atrium is severely dilated.  Right Ventricle: Mild right ventricular enlargement.   Systolic function is normal.Right Atrium: Right atrium is severely dilated.  Aortic Valve: The aortic valve is a trileaflet valve. Mildly restricted motion.  Mitral Valve: Moderately thickened leaflets. There is mild regurgitation.  Pulmonic Valve: There is mild regurgitation.  Pericardium: Small circumferential pericardial effusion present. No indication of cardiac tamponade.    Review of  patient's allergies indicates:   Allergen Reactions    Colestipol     Meloxicam     Rosuvastatin     Simvastatin     Statins-hmg-coa reductase inhibitors     Tramadol        No current facility-administered medications on file prior to encounter.     Current Outpatient Medications on File Prior to Encounter   Medication Sig    hawthorn 500 mg Cap Take 1 capsule by mouth.    levalbuterol (XOPENEX) 0.63 mg/3 mL nebulizer solution Take 3 mLs (0.63 mg total) by nebulization every 8 (eight) hours. Rescue    metoprolol succinate (TOPROL-XL) 25 MG 24 hr tablet Take 3 tablets (75 mg total) by mouth once daily.    rivaroxaban (XARELTO) 20 mg Tab Take 1 tablet (20 mg total) by mouth daily with dinner or evening meal.     Review of Systems   Constitutional:  Negative for fatigue.   Respiratory:  Negative for chest tightness and shortness of breath.    Cardiovascular:  Negative for chest pain.   Gastrointestinal:  Negative for blood in stool.   All other systems reviewed and are negative.    Objective:     Vital Signs (Most Recent):  Temp: 97.9 °F (36.6 °C) (08/17/23 0741)  Pulse: (P) 103 (08/17/23 1005)  Resp: 16 (08/17/23 0741)  BP: (P) 112/70 (08/17/23 1005)  SpO2: 95 % (08/17/23 0741) Vital Signs (24h Range):  Temp:  [97.3 °F (36.3 °C)-98.6 °F (37 °C)] 97.9 °F (36.6 °C)  Pulse:  [] (P) 103  Resp:  [10-21] 16  SpO2:  [91 %-97 %] 95 %  BP: ()/(52-83) (P) 112/70     Weight: 71.2 kg (157 lb)  Body mass index is 22.53 kg/m².    SpO2: 95 %       Intake/Output Summary (Last 24 hours) at 8/17/2023 1016  Last data filed at 8/16/2023 2322  Gross per 24 hour   Intake 1250 ml   Output 1150 ml   Net 100 ml       Lines/Drains/Airways       Peripheral Intravenous Line  Duration                  Peripheral IV - Single Lumen 08/11/23 1539 22 G Anterior;Left;Proximal Forearm 5 days         Peripheral IV - Single Lumen 08/16/23 1344 20 G Right Forearm <1 day                  Significant Labs:  Recent Results (from the past 72  hour(s))   Hemoglobin and Hematocrit    Collection Time: 08/14/23  2:48 PM   Result Value Ref Range    Hgb 10.9 (L) 14.0 - 18.0 g/dL    Hct 33.2 (L) 42.0 - 52.0 %   Comprehensive Metabolic Panel    Collection Time: 08/15/23  4:21 AM   Result Value Ref Range    Sodium Level 137 136 - 145 mmol/L    Potassium Level 4.6 3.5 - 5.1 mmol/L    Chloride 100 98 - 107 mmol/L    Carbon Dioxide 28 23 - 31 mmol/L    Glucose Level 116 (H) 82 - 115 mg/dL    Blood Urea Nitrogen 21.6 8.4 - 25.7 mg/dL    Creatinine 0.90 0.73 - 1.18 mg/dL    Calcium Level Total 8.8 8.8 - 10.0 mg/dL    Protein Total 6.4 5.8 - 7.6 gm/dL    Albumin Level 2.9 (L) 3.4 - 4.8 g/dL    Globulin 3.5 2.4 - 3.5 gm/dL    Albumin/Globulin Ratio 0.8 (L) 1.1 - 2.0 ratio    Bilirubin Total 0.3 <=1.5 mg/dL    Alkaline Phosphatase 51 40 - 150 unit/L    Alanine Aminotransferase 21 0 - 55 unit/L    Aspartate Aminotransferase 23 5 - 34 unit/L    eGFR >60 mls/min/1.73/m2   CBC with Differential    Collection Time: 08/15/23  4:21 AM   Result Value Ref Range    WBC 12.99 (H) 4.50 - 11.50 x10(3)/mcL    RBC 3.89 (L) 4.70 - 6.10 x10(6)/mcL    Hgb 10.6 (L) 14.0 - 18.0 g/dL    Hct 32.1 (L) 42.0 - 52.0 %    MCV 82.5 80.0 - 94.0 fL    MCH 27.2 27.0 - 31.0 pg    MCHC 33.0 33.0 - 36.0 g/dL    RDW 15.1 11.5 - 17.0 %    Platelet 374 130 - 400 x10(3)/mcL    MPV 9.9 7.4 - 10.4 fL    Neut % 77.2 %    Lymph % 13.3 %    Mono % 8.2 %    Eos % 0.6 %    Basophil % 0.2 %    Lymph # 1.73 0.6 - 4.6 x10(3)/mcL    Neut # 10.03 (H) 2.1 - 9.2 x10(3)/mcL    Mono # 1.06 0.1 - 1.3 x10(3)/mcL    Eos # 0.08 0 - 0.9 x10(3)/mcL    Baso # 0.03 <=0.2 x10(3)/mcL    IG# 0.06 (H) 0 - 0.04 x10(3)/mcL    IG% 0.5 %    NRBC% 0.0 %   Comprehensive Metabolic Panel    Collection Time: 08/16/23  4:07 AM   Result Value Ref Range    Sodium Level 138 136 - 145 mmol/L    Potassium Level 4.2 3.5 - 5.1 mmol/L    Chloride 96 (L) 98 - 107 mmol/L    Carbon Dioxide 31 23 - 31 mmol/L    Glucose Level 108 82 - 115 mg/dL    Blood Urea  Nitrogen 22.9 8.4 - 25.7 mg/dL    Creatinine 1.09 0.73 - 1.18 mg/dL    Calcium Level Total 8.9 8.8 - 10.0 mg/dL    Protein Total 6.2 5.8 - 7.6 gm/dL    Albumin Level 2.9 (L) 3.4 - 4.8 g/dL    Globulin 3.3 2.4 - 3.5 gm/dL    Albumin/Globulin Ratio 0.9 (L) 1.1 - 2.0 ratio    Bilirubin Total 0.3 <=1.5 mg/dL    Alkaline Phosphatase 52 40 - 150 unit/L    Alanine Aminotransferase 29 0 - 55 unit/L    Aspartate Aminotransferase 28 5 - 34 unit/L    eGFR >60 mls/min/1.73/m2   CBC with Differential    Collection Time: 08/16/23  4:08 AM   Result Value Ref Range    WBC 12.53 (H) 4.50 - 11.50 x10(3)/mcL    RBC 3.70 (L) 4.70 - 6.10 x10(6)/mcL    Hgb 10.1 (L) 14.0 - 18.0 g/dL    Hct 30.0 (L) 42.0 - 52.0 %    MCV 81.1 80.0 - 94.0 fL    MCH 27.3 27.0 - 31.0 pg    MCHC 33.7 33.0 - 36.0 g/dL    RDW 15.3 11.5 - 17.0 %    Platelet 463 (H) 130 - 400 x10(3)/mcL    MPV 9.2 7.4 - 10.4 fL    Neut % 74.8 %    Lymph % 16.6 %    Mono % 7.5 %    Eos % 0.4 %    Basophil % 0.2 %    Lymph # 2.08 0.6 - 4.6 x10(3)/mcL    Neut # 9.37 (H) 2.1 - 9.2 x10(3)/mcL    Mono # 0.94 0.1 - 1.3 x10(3)/mcL    Eos # 0.05 0 - 0.9 x10(3)/mcL    Baso # 0.03 <=0.2 x10(3)/mcL    IG# 0.06 (H) 0 - 0.04 x10(3)/mcL    IG% 0.5 %    NRBC% 0.0 %   Comprehensive Metabolic Panel    Collection Time: 08/17/23  3:49 AM   Result Value Ref Range    Sodium Level 138 136 - 145 mmol/L    Potassium Level 4.1 3.5 - 5.1 mmol/L    Chloride 96 (L) 98 - 107 mmol/L    Carbon Dioxide 31 23 - 31 mmol/L    Glucose Level 114 82 - 115 mg/dL    Blood Urea Nitrogen 20.8 8.4 - 25.7 mg/dL    Creatinine 1.10 0.73 - 1.18 mg/dL    Calcium Level Total 8.8 8.8 - 10.0 mg/dL    Protein Total 6.1 5.8 - 7.6 gm/dL    Albumin Level 2.9 (L) 3.4 - 4.8 g/dL    Globulin 3.2 2.4 - 3.5 gm/dL    Albumin/Globulin Ratio 0.9 (L) 1.1 - 2.0 ratio    Bilirubin Total 0.4 <=1.5 mg/dL    Alkaline Phosphatase 54 40 - 150 unit/L    Alanine Aminotransferase 31 0 - 55 unit/L    Aspartate Aminotransferase 26 5 - 34 unit/L    eGFR >60  mls/min/1.73/m2   CBC with Differential    Collection Time: 08/17/23  3:49 AM   Result Value Ref Range    WBC 11.01 4.50 - 11.50 x10(3)/mcL    RBC 4.10 (L) 4.70 - 6.10 x10(6)/mcL    Hgb 11.0 (L) 14.0 - 18.0 g/dL    Hct 33.5 (L) 42.0 - 52.0 %    MCV 81.7 80.0 - 94.0 fL    MCH 26.8 (L) 27.0 - 31.0 pg    MCHC 32.8 (L) 33.0 - 36.0 g/dL    RDW 15.4 11.5 - 17.0 %    Platelet 466 (H) 130 - 400 x10(3)/mcL    MPV 8.7 7.4 - 10.4 fL    Neut % 70.9 %    Lymph % 17.3 %    Mono % 9.4 %    Eos % 1.2 %    Basophil % 0.6 %    Lymph # 1.91 0.6 - 4.6 x10(3)/mcL    Neut # 7.80 2.1 - 9.2 x10(3)/mcL    Mono # 1.03 0.1 - 1.3 x10(3)/mcL    Eos # 0.13 0 - 0.9 x10(3)/mcL    Baso # 0.07 <=0.2 x10(3)/mcL    IG# 0.07 (H) 0 - 0.04 x10(3)/mcL    IG% 0.6 %    NRBC% 0.0 %     Significant Imaging:  Imaging Results              X-Ray Chest AP Portable (Final result)  Result time 08/11/23 15:17:42      Final result by Linwood Loera MD (08/11/23 15:17:42)                   Impression:      No adverse interval change.  Left-sided pleural effusion remains.      Electronically signed by: Linwood Loera  Date:    08/11/2023  Time:    15:17               Narrative:    EXAMINATION:  XR CHEST AP PORTABLE    CLINICAL HISTORY:  Shortness of breath    TECHNIQUE:  Single view of the chest    COMPARISON:  08/10/2023    FINDINGS:  No focal opacification.  Left-sided pleural effusion similar to prior.  Cardiomegaly is unchanged.  No pneumothorax.                                    Telemetry:  AF    Physical Exam  Vitals and nursing note reviewed.   Constitutional:       General: He is not in acute distress.     Appearance: Normal appearance.   HENT:      Head: Normocephalic.      Mouth/Throat:      Mouth: Mucous membranes are moist.      Pharynx: Oropharynx is clear.   Cardiovascular:      Rate and Rhythm: Normal rate. Rhythm irregular.      Heart sounds: Normal heart sounds.   Pulmonary:      Effort: Pulmonary effort is normal. No respiratory distress.    Abdominal:      Palpations: Abdomen is soft.      Tenderness: There is no guarding.   Musculoskeletal:      Cervical back: Neck supple.   Skin:     General: Skin is warm and dry.   Neurological:      Mental Status: He is alert. Mental status is at baseline.   Psychiatric:         Behavior: Behavior normal.     Current Inpatient Medications:    Current Facility-Administered Medications:     acetaminophen tablet 1,000 mg, 1,000 mg, Oral, Q6H PRN, Edmond Auguste MD, 1,000 mg at 08/12/23 1205    acetaminophen tablet 650 mg, 650 mg, Oral, Q4H PRN, Edmond Auguste MD    aluminum-magnesium hydroxide-simethicone 200-200-20 mg/5 mL suspension 30 mL, 30 mL, Oral, QID PRN, Edmond Auguste MD    fluticasone furoate-vilanteroL 100-25 mcg/dose diskus inhaler 1 puff, 1 puff, Inhalation, Daily, Aurora Fernández, FNP, 1 puff at 08/15/23 0920    furosemide injection 40 mg, 40 mg, Intravenous, Q12H, Edmond Auguste MD, 40 mg at 08/17/23 0533    guaiFENesin 100 mg/5 ml syrup 200 mg, 200 mg, Oral, Q4H PRN, Aurora Fernández, FNP    guaiFENesin 12 hr tablet 600 mg, 600 mg, Oral, BID, Edmond Auguste MD, 600 mg at 08/16/23 2005    levalbuterol nebulizer solution 1.25 mg, 1 ampule, Nebulization, Q8H PRN, Edmond Auguste MD    LIDOcaine (PF) 10 mg/ml (1%) injection 10 mg, 1 mL, Intradermal, Once, Ralph Rojas MD    melatonin tablet 6 mg, 6 mg, Oral, Nightly PRN, Edmond Auguste MD    metoprolol injection 5 mg, 5 mg, Intravenous, Q6H PRN, Edmond Auguste MD    metoprolol succinate 24 hr tablet 75 mg, 75 mg, Oral, Daily, Edmond Auguste MD, 75 mg at 08/15/23 0919    ondansetron injection 4 mg, 4 mg, Intravenous, Q4H PRN, Edmond Auguste MD    pantoprazole EC tablet 40 mg, 40 mg, Oral, BID AC, Kaylin Mohan MD, 40 mg at 08/17/23 0712    polyethylene glycol packet 17 g, 17 g, Oral, BID PRN, MolinaEdmond MD    predniSONE tablet 20 mg, 20 mg, Oral, Daily, Kaylin Mohan MD, 20 mg at 08/15/23 0919    prochlorperazine injection Soln 5 mg, 5 mg, Intravenous,  Q6H PRN, Edmond Auguste MD    senna-docusate 8.6-50 mg per tablet 2 tablet, 2 tablet, Oral, BID PRN, Edmond Auguste MD    sodium chloride 0.9% flush 10 mL, 10 mL, Intravenous, PRN, Edmond Auguste MD    sucralfate tablet 1 g, 1 g, Oral, QID (AC & HS), Pauline Kwon ANP, 1 g at 08/17/23 0712    valsartan tablet 40 mg, 40 mg, Oral, Daily, Nila Angel FNP, 40 mg at 08/15/23 0919    VTE Risk Mitigation (From admission, onward)           Ordered     IP VTE HIGH RISK PATIENT  Once         08/11/23 2053     Place sequential compression device  Until discontinued         08/11/23 2053                  Assessment:   Acute/Chronic Systolic Heart Failure  --appears compensated  Cardiomyopathy/Left Ventricular Systolic Dysfunction (Unspecified for Now)    - EF 35% Down from EF 45-50% (A few Days Prior)    - Bilateral Pleural Effusions  Acute GI Bleed  Chronic Interstitial Lung Disease  Hypertension (BP Stable)  Hyperlipidemia    - Statin Intolerance  Polysubstance Abuse- Nicotine Dependence/History of Heavy Tobacco Use & Regular Alcohol Consumption (Reported Recent Cessation)\  Atrial Fibrillation (Recent New Diagnosis on 8.5.23)    - On Xarelto Outpatient    - [id number]: 4 (LVSD/HTN/Age)  Melena with Suspected Acute GI Bleed    - GI Workup   Anemia/stable  History of Medical Noncompliance    Plan:   Optimize CHF GDMT- Continue Lasix 40 mg daily and valsartan 40 mg daily. Continue Metoprolol succinate 75 mg daily. Continue optimization of HF medications on f/u   Seen by GI- cauterization completed yesterday. Pt is able to eat from our stand point at this time.   -Recommend keeping pt until tomorrow morning to monitor H/H. If H/H remains stable for 48 hours s/p cauterization pt is cleared to resume his home Xarelto.   On PPI per GI recommendations  K+ Goal- 4, Mag Goal- 2      Kelby Perez MD  Cardiology  [facility] - 8th Floor Med Surg  08/17/2023       I have seen the patient, reviewed the  resident's note, assessment and plan. I have personally interviewed and examined the patient at bedside and agree with the findings. Medical decision making listed above were done under my guidance.

## 2023-08-17 NOTE — PROGRESS NOTES
"Gastroenterology Progress Note  The patient seen and independently examined.  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  I agree with the assessment and plan noted by the PA below.  On physical exam his abdomen is nontender.  The patient may resume regular diet and he is okay for discharge from a GI standpoint  Subjective  Pt doing well s/p colonoscopy with evidence of recently bleeding AVM x2, s/p APC and diverticulosis.   Pt with BM this morning, brown w/o further evidence of hematochezia.   No acute complaints.       Objective:    ROS:    Review of Systems   Constitutional:  Negative for chills and fever.   HENT:  Negative for sore throat.    Respiratory:  Negative for shortness of breath, wheezing and stridor.    Gastrointestinal:  Negative for abdominal pain, blood in stool, diarrhea, heartburn, melena, nausea and vomiting.   Neurological:  Negative for seizures and loss of consciousness.   Psychiatric/Behavioral:  The patient is not nervous/anxious.          Vital Signs:  /70   Pulse 103   Temp 97.9 °F (36.6 °C) (Oral)   Resp 16   Ht 5' 10" (1.778 m)   Wt 71.2 kg (157 lb)   SpO2 95%   BMI 22.53 kg/m²   Body mass index is 22.53 kg/m².    Physical Exam:    Physical Exam  Constitutional:       General: He is not in acute distress.     Appearance: He is not ill-appearing.   HENT:      Head: Normocephalic and atraumatic.      Nose: Nose normal.   Eyes:      Extraocular Movements: Extraocular movements intact.   Pulmonary:      Effort: Pulmonary effort is normal. No respiratory distress.      Breath sounds: No stridor.   Abdominal:      General: Bowel sounds are normal.      Palpations: Abdomen is soft.   Skin:     General: Skin is warm and dry.      Coloration: Skin is not jaundiced or pale.   Neurological:      General: No focal deficit present.      Mental Status: He is alert and oriented to person, place, and time. Mental status is at baseline. "   Psychiatric:         Mood and Affect: Mood normal.         Behavior: Behavior normal.         Thought Content: Thought content normal.         Judgment: Judgment normal.         Labs:  Recent Results (from the past 24 hour(s))   Comprehensive Metabolic Panel    Collection Time: 08/17/23  3:49 AM   Result Value Ref Range    Sodium Level 138 136 - 145 mmol/L    Potassium Level 4.1 3.5 - 5.1 mmol/L    Chloride 96 (L) 98 - 107 mmol/L    Carbon Dioxide 31 23 - 31 mmol/L    Glucose Level 114 82 - 115 mg/dL    Blood Urea Nitrogen 20.8 8.4 - 25.7 mg/dL    Creatinine 1.10 0.73 - 1.18 mg/dL    Calcium Level Total 8.8 8.8 - 10.0 mg/dL    Protein Total 6.1 5.8 - 7.6 gm/dL    Albumin Level 2.9 (L) 3.4 - 4.8 g/dL    Globulin 3.2 2.4 - 3.5 gm/dL    Albumin/Globulin Ratio 0.9 (L) 1.1 - 2.0 ratio    Bilirubin Total 0.4 <=1.5 mg/dL    Alkaline Phosphatase 54 40 - 150 unit/L    Alanine Aminotransferase 31 0 - 55 unit/L    Aspartate Aminotransferase 26 5 - 34 unit/L    eGFR >60 mls/min/1.73/m2   CBC with Differential    Collection Time: 08/17/23  3:49 AM   Result Value Ref Range    WBC 11.01 4.50 - 11.50 x10(3)/mcL    RBC 4.10 (L) 4.70 - 6.10 x10(6)/mcL    Hgb 11.0 (L) 14.0 - 18.0 g/dL    Hct 33.5 (L) 42.0 - 52.0 %    MCV 81.7 80.0 - 94.0 fL    MCH 26.8 (L) 27.0 - 31.0 pg    MCHC 32.8 (L) 33.0 - 36.0 g/dL    RDW 15.4 11.5 - 17.0 %    Platelet 466 (H) 130 - 400 x10(3)/mcL    MPV 8.7 7.4 - 10.4 fL    Neut % 70.9 %    Lymph % 17.3 %    Mono % 9.4 %    Eos % 1.2 %    Basophil % 0.6 %    Lymph # 1.91 0.6 - 4.6 x10(3)/mcL    Neut # 7.80 2.1 - 9.2 x10(3)/mcL    Mono # 1.03 0.1 - 1.3 x10(3)/mcL    Eos # 0.13 0 - 0.9 x10(3)/mcL    Baso # 0.07 <=0.2 x10(3)/mcL    IG# 0.07 (H) 0 - 0.04 x10(3)/mcL    IG% 0.6 %    NRBC% 0.0 %       Assessment/Plan:  Patient is a 88 y.o. male unknown to our group with a history of atrial fibrillation (on Xarelto), heart failure with reduced ejection fraction (most recent 35%), hypertension, dyslipidemia who  presented to the ED with dyspnea and dark stools. GI consulted earlier this admit with no indications for inpatient endoscopy- dark stools had resolved with stable hgb.   We are now being called back for persistent bleeding.     Hematochezia- resolved, secondary to AVMs  -s/p colonoscopy 08/16 with two recently bleeding AVMs s/p APC, diverticulosis in entire colon.  - hgb stable  - no further intervention planned from a GI standpoint- will discuss recommended timeline of resuming Xarelto with MD  2. New onset afib  - cardiology on board   3. HF with reduced EF, most recent 35%  - cardiology with plans for outpatient workup        Gi will sign off with no further recommendations at this time. Please call or page with any questions.     Anita Lake PA-C  Gastroenterology  [facility]

## 2023-08-17 NOTE — NURSING
Pt tolerated full liquid diet well   No Bleeding is reported per rectum or otherwise     Pt is noted to be watching TV family at side times one with no concerns noted or voiced, call light is in reach bed in lowest position and hob elevated

## 2023-08-18 VITALS
BODY MASS INDEX: 22.48 KG/M2 | OXYGEN SATURATION: 96 % | TEMPERATURE: 98 F | HEART RATE: 77 BPM | WEIGHT: 157 LBS | DIASTOLIC BLOOD PRESSURE: 77 MMHG | SYSTOLIC BLOOD PRESSURE: 117 MMHG | HEIGHT: 70 IN | RESPIRATION RATE: 18 BRPM

## 2023-08-18 PROBLEM — K92.2 GI BLEED: Status: RESOLVED | Noted: 2023-08-11 | Resolved: 2023-08-18

## 2023-08-18 LAB
ALBUMIN SERPL-MCNC: 2.9 G/DL (ref 3.4–4.8)
ALBUMIN/GLOB SERPL: 0.9 RATIO (ref 1.1–2)
ALP SERPL-CCNC: 57 UNIT/L (ref 40–150)
ALT SERPL-CCNC: 28 UNIT/L (ref 0–55)
AST SERPL-CCNC: 18 UNIT/L (ref 5–34)
BASOPHILS # BLD AUTO: 0.04 X10(3)/MCL
BASOPHILS NFR BLD AUTO: 0.3 %
BILIRUB SERPL-MCNC: 0.3 MG/DL
BUN SERPL-MCNC: 25.8 MG/DL (ref 8.4–25.7)
CALCIUM SERPL-MCNC: 9.3 MG/DL (ref 8.8–10)
CHLORIDE SERPL-SCNC: 97 MMOL/L (ref 98–107)
CO2 SERPL-SCNC: 31 MMOL/L (ref 23–31)
CREAT SERPL-MCNC: 1.19 MG/DL (ref 0.73–1.18)
EOSINOPHIL # BLD AUTO: 0.13 X10(3)/MCL (ref 0–0.9)
EOSINOPHIL NFR BLD AUTO: 0.9 %
ERYTHROCYTE [DISTWIDTH] IN BLOOD BY AUTOMATED COUNT: 15.2 % (ref 11.5–17)
GFR SERPLBLD CREATININE-BSD FMLA CKD-EPI: 59 MLS/MIN/1.73/M2
GLOBULIN SER-MCNC: 3.3 GM/DL (ref 2.4–3.5)
GLUCOSE SERPL-MCNC: 174 MG/DL (ref 82–115)
HCT VFR BLD AUTO: 32.2 % (ref 42–52)
HGB BLD-MCNC: 10.6 G/DL (ref 14–18)
IMM GRANULOCYTES # BLD AUTO: 0.09 X10(3)/MCL (ref 0–0.04)
IMM GRANULOCYTES NFR BLD AUTO: 0.6 %
LYMPHOCYTES # BLD AUTO: 2 X10(3)/MCL (ref 0.6–4.6)
LYMPHOCYTES NFR BLD AUTO: 13.4 %
MAGNESIUM SERPL-MCNC: 2 MG/DL (ref 1.6–2.6)
MCH RBC QN AUTO: 27.1 PG (ref 27–31)
MCHC RBC AUTO-ENTMCNC: 32.9 G/DL (ref 33–36)
MCV RBC AUTO: 82.4 FL (ref 80–94)
MONOCYTES # BLD AUTO: 1.05 X10(3)/MCL (ref 0.1–1.3)
MONOCYTES NFR BLD AUTO: 7 %
NEUTROPHILS # BLD AUTO: 11.63 X10(3)/MCL (ref 2.1–9.2)
NEUTROPHILS NFR BLD AUTO: 77.8 %
NRBC BLD AUTO-RTO: 0 %
PLATELET # BLD AUTO: 482 X10(3)/MCL (ref 130–400)
PMV BLD AUTO: 9.1 FL (ref 7.4–10.4)
POTASSIUM SERPL-SCNC: 4.1 MMOL/L (ref 3.5–5.1)
PROT SERPL-MCNC: 6.2 GM/DL (ref 5.8–7.6)
RBC # BLD AUTO: 3.91 X10(6)/MCL (ref 4.7–6.1)
SODIUM SERPL-SCNC: 137 MMOL/L (ref 136–145)
WBC # SPEC AUTO: 14.94 X10(3)/MCL (ref 4.5–11.5)

## 2023-08-18 PROCEDURE — 63600175 PHARM REV CODE 636 W HCPCS: Performed by: INTERNAL MEDICINE

## 2023-08-18 PROCEDURE — 83735 ASSAY OF MAGNESIUM: CPT | Performed by: INTERNAL MEDICINE

## 2023-08-18 PROCEDURE — 25000003 PHARM REV CODE 250: Performed by: NURSE PRACTITIONER

## 2023-08-18 PROCEDURE — 80053 COMPREHEN METABOLIC PANEL: CPT | Performed by: INTERNAL MEDICINE

## 2023-08-18 PROCEDURE — 25000003 PHARM REV CODE 250: Performed by: INTERNAL MEDICINE

## 2023-08-18 PROCEDURE — 85025 COMPLETE CBC W/AUTO DIFF WBC: CPT | Performed by: INTERNAL MEDICINE

## 2023-08-18 PROCEDURE — 25000242 PHARM REV CODE 250 ALT 637 W/ HCPCS: Performed by: NURSE PRACTITIONER

## 2023-08-18 RX ORDER — FUROSEMIDE 40 MG/1
40 TABLET ORAL DAILY
Status: DISCONTINUED | OUTPATIENT
Start: 2023-08-18 | End: 2023-08-18 | Stop reason: HOSPADM

## 2023-08-18 RX ORDER — FUROSEMIDE 40 MG/1
40 TABLET ORAL DAILY
Qty: 30 TABLET | Refills: 2 | Status: ON HOLD | OUTPATIENT
Start: 2023-08-19 | End: 2023-08-26 | Stop reason: SDUPTHER

## 2023-08-18 RX ADMIN — FUROSEMIDE 40 MG: 40 TABLET ORAL at 08:08

## 2023-08-18 RX ADMIN — GUAIFENESIN 600 MG: 600 TABLET, EXTENDED RELEASE ORAL at 08:08

## 2023-08-18 RX ADMIN — SUCRALFATE 1 G: 1 TABLET ORAL at 06:08

## 2023-08-18 RX ADMIN — FLUTICASONE FUROATE AND VILANTEROL TRIFENATATE 1 PUFF: 100; 25 POWDER RESPIRATORY (INHALATION) at 08:08

## 2023-08-18 RX ADMIN — SUCRALFATE 1 G: 1 TABLET ORAL at 11:08

## 2023-08-18 RX ADMIN — SUCRALFATE 1 G: 1 TABLET ORAL at 08:08

## 2023-08-18 RX ADMIN — PANTOPRAZOLE SODIUM 40 MG: 40 TABLET, DELAYED RELEASE ORAL at 06:08

## 2023-08-18 RX ADMIN — VALSARTAN 40 MG: 40 TABLET, FILM COATED ORAL at 08:08

## 2023-08-18 RX ADMIN — METOPROLOL SUCCINATE 75 MG: 50 TABLET, EXTENDED RELEASE ORAL at 08:08

## 2023-08-18 RX ADMIN — FUROSEMIDE 40 MG: 10 INJECTION, SOLUTION INTRAMUSCULAR; INTRAVENOUS at 05:08

## 2023-08-18 NOTE — DISCHARGE SUMMARY
[facility] - 8th Floor Med Surg  [facility] Medicine  Discharge Summary      Patient Name: Lizandro Martinez  MRN: [id number]  [facility]: [id number]  Patient Class: IP- Inpatient  Admission Date: 8/11/2023  Hospital Length of Stay: 7 days  Discharge Date and Time:  08/18/2023 9:46 AM  Attending Physician: Kemar Goldman MD   Discharging Provider: Kemar Goldman MD  Primary Care Provider: [facility]    Primary Care Team: Networked reference to record PCT         88-year-old male with significant history of PAF on Xarelto, systolic heart failure with recent echocardiogram showing ejection fraction-45-50%, HTN, HLD, medical noncompliance, heavy tobacco use .  patient was recently started on Xarelto for thromboembolic prophylaxis for AFib.  He had a recent hospitalization from 08/04 to 8/6 for acute decompensated heart failure with AFib and was discharged on diuretics, Xarelto and metoprolol.  Patient presented back to the ED with complaints of worsening shortness of breath, nonproductive cough and also melanotic stools.  cough is chronic for the past several months, but lately has been worsening.  [facility] prescribed azithromycin, Lasix and was discharged home.  but presented back to the ED given persistent complaints.  Patient was hemodynamically stable.  Lab significant for anemia with hemoglobin-11, INR slightly elevated.  Patient initiated on IV Lasix, IV Protonix and was admitted to hospitalist medicine service.  Patient was previously heavy smoker, quit 40 years back.  GI services consulted for GI bleed.  Xarelto held.  CT chest in the ED showed bilateral pleural effusion, cardiomegaly and small pericardial effusion, possible interstitial lung disease.  Did have expiratory wheezes.  Initiated bronchodilators.  Also initiated low-dose prednisone.  Repeat echocardiogram ordered on 08/20 given concern for pericardial effusion is CT.  No  pericardial effusion, but EF dropped to 35% .  Decided to consult Cardiology.  Cardiology recommended to continue medical management, plan for ischemic evaluation as outpatient.  GI evaluated, since hemoglobin was stable no plans for endoscopic evaluation, cleared for anticoagulation.  Xarelto initiated on 8/13.  Hemoglobin slightly dropped on 08/14 patient had couple of episodes of dark bowel movements.  But became lighter later.  Patient had prakash hematochezia on 08/15.  Hemoglobin still more than 10.  GI was reconsulted &   Aspirin, Xarelto held.  Colonoscopy 08/16/2023 revealed to medium-sized angiodysplastic lesions with stigmata of recent bleeding in the ascending colon and cecum which was treated with argon plasma coagulation.  Diverticulosis was seen in the entire colon.  GI cleared for anticoagulation.  His hemoglobin remained stable and Xarelto will be resumed at discharge.  Cardiology has adjusted GDM T, diuresis.  All medications were reconciled, necessary prescriptions were provided.  He was counseled on medication adherence, outpatient follow up.        Acute decompensated systolic heart failure with EF 35%-improving  Bilateral pleural effusion due to above-resolving  Hematochezia due to angiodysplastic lesion of colon s/p APC 08/16/2023  Acute blood loss ammonia due to above-stable   Recent diagnosis of AFib on Xarelto-currently on hold  Diverticulosis     HX: HTN, HLD, medication noncompliance, tobacco use,?  On diagnosed COPD/emphysema         Procedure(s) (LRB):  COLON (N/A)  COLONOSCOPY, WITH POLYPECTOMY USING SNARE (N/A)  COLONOSCOPY, WITH HEMORRHAGE CONTROL (N/A)      Goals of Care Treatment Preferences:  Code Status: Full Code    Vitals:    08/18/23 0856   BP:    Pulse: 77   Resp: 18   Temp:       General: In no acute distress, afebrile  Respiratory: Clear to auscultation bilaterally  Cardiovascular: S1, S2, no appreciable murmur  Abdomen: Soft, nontender, BS +  MSK: Warm, no lower extremity  edema, no clubbing or cyanosis  Neurologic: Alert and oriented x4, moving all extremities with good strength          Consults:   Consults (From admission, onward)          Status Ordering Provider     Inpatient consult to Cardiology  Once        Provider:  Tyrone Bernstein MD    Acknowledged SHON PINEDA     Inpatient consult to Gastroenterology  Once        Provider:  Ton Minor MD    Acknowledged SHON PINEDA     Inpatient consult to Social Work/Case Management  Once        Provider:  (Not yet assigned)    Acknowledged SHON PINEDA     Inpatient consult to Cardiology  Once        Provider:  Kaleb Skelton MD    Completed SHON PINEDA     Inpatient consult to Gastroenterology  Once        Provider:  Presley Benjamin MD    Completed JUAN LILLY     Inpatient consult to Registered Dietitian/Nutritionist  Once        Provider:  (Not yet assigned)    Completed JUAN LILLY            No new Assessment & Plan notes have been filed under this hospital service since the last note was generated.  Service: Hospital Medicine    Final Active Diagnoses:      Problems Resolved During this Admission:    Diagnosis Date Noted Date Resolved POA    PRINCIPAL PROBLEM:  GI bleed [K92.2] 08/11/2023 08/18/2023 Yes       Discharged Condition: good    Disposition: Home-Health Care [facility]    Follow Up:   Follow-up Information       [facility]    Why: @1400  vince stokes/susi   lab appt. 10/10 @7350  Contact information:  [address] [place] [address]  [phone]               Outpatient pulmonology evaluation at [facility] in 2 weeks Follow up.    Why: office will call             Oniel Cobb Jr., MD Follow up on 8/22/2023.    Specialty: Cardiology  Why: @ 9:15AM for Rachael Spect  Contact information:  [address] [place] [address]  [phone]               Oniel Cobb Jr., MD Follow up on 8/25/2023.    Specialty: Cardiology  Why: @ 9:40AM for  follow up.  Contact information:  [address] [place] [address]  [phone]               [facility] Follow up.    Specialty: Home Health Services  Why: This is your home health agency  Contact information:  [address] [place] [address]  [phone]                           Patient Instructions:   No discharge procedures on file.    Significant Diagnostic Studies: Labs: CMP   Recent Labs   Lab 08/17/23  0349 08/18/23  0407    137   K 4.1 4.1   CO2 31 31   BUN 20.8 25.8*   CREATININE 1.10 1.19*   CALCIUM 8.8 9.3   ALBUMIN 2.9* 2.9*   BILITOT 0.4 0.3   ALKPHOS 54 57   AST 26 18   ALT 31 28       Pending Diagnostic Studies:       Procedure Component Value Units Date/Time    Respiratory Panel [[id number]]     Order Status: Sent Lab Status: No result     Specimen: Nasopharyngeal            Medications:  Reconciled Home Medications:      Medication List        START taking these medications      aspirin 81 MG Chew  Take 1 tablet (81 mg total) by mouth once daily.     fluticasone furoate-vilanteroL 100-25 mcg/dose diskus inhaler  Commonly known as: BREO  Inhale 1 puff into the lungs once daily. Controller     guaiFENesin 100 mg/5 ml 100 mg/5 mL syrup  Commonly known as: ROBITUSSIN  Take 10 mLs (200 mg total) by mouth 4 (four) times daily as needed for Congestion or Cough.     pantoprazole 40 MG tablet  Commonly known as: PROTONIX  Take 1 tablet (40 mg total) by mouth 2 (two) times daily before meals.     sucralfate 1 gram tablet  Commonly known as: CARAFATE  Take 1 tablet (1 g total) by mouth 4 (four) times daily before meals and nightly.     valsartan 40 MG tablet  Commonly known as: DIOVAN  Take 1 tablet (40 mg total) by mouth once daily.            CHANGE how you take these medications      furosemide 40 MG tablet  Commonly known as: LASIX  Take 1 tablet (40 mg total) by mouth once daily.  Start taking on: August 19, 2023  What changed: Another medication with the same name  was removed. Continue taking this medication, and follow the directions you see here.     levalbuterol 0.63 mg/3 mL nebulizer solution  Commonly known as: XOPENEX  Take 3 mLs (0.63 mg total) by nebulization every 8 (eight) hours. Rescue  What changed: Another medication with the same name was removed. Continue taking this medication, and follow the directions you see here.     metoprolol succinate 25 MG 24 hr tablet  Commonly known as: TOPROL-XL  Take 3 tablets (75 mg total) by mouth once daily.  What changed: Another medication with the same name was removed. Continue taking this medication, and follow the directions you see here.     rivaroxaban 20 mg Tab  Commonly known as: XARELTO  Take 1 tablet (20 mg total) by mouth daily with dinner or evening meal.  What changed: Another medication with the same name was removed. Continue taking this medication, and follow the directions you see here.            CONTINUE taking these medications      hawthorn 500 mg Cap  Take 1 capsule by mouth.            STOP taking these medications      azithromycin 250 MG tablet  Commonly known as: Z-JOSSY            ASK your doctor about these medications      predniSONE 20 MG tablet  Commonly known as: DELTASONE  Take 1 tablet (20 mg total) by mouth once daily. for 3 days  Ask about: Should I take this medication?              Indwelling Lines/Drains at time of discharge:   Lines/Drains/Airways       None                   Time spent on the discharge of patient: 35 minutes         Kemar Goldman MD  Department of Hospital Medicine  [facility] - 8th Floor Med Surg

## 2023-08-18 NOTE — PROGRESS NOTES
Pt was stable and discharged to home. He was given instructions on discharge education, home meds, and follow up appointments. He stated that he had no further questions.

## 2023-08-21 ENCOUNTER — HOSPITAL ENCOUNTER (INPATIENT)
Facility: HOSPITAL | Age: 88
LOS: 4 days | Discharge: HOME HEALTH | DRG: 378 | End: 2023-08-26
Attending: STUDENT IN AN ORGANIZED HEALTH CARE EDUCATION/TRAINING PROGRAM | Admitting: INTERNAL MEDICINE
Payer: OTHER GOVERNMENT

## 2023-08-21 DIAGNOSIS — K92.2 ACUTE LOWER GI BLEEDING: ICD-10-CM

## 2023-08-21 DIAGNOSIS — K92.2 LOWER GASTROINTESTINAL BLEEDING: ICD-10-CM

## 2023-08-21 DIAGNOSIS — D62 ACUTE BLOOD LOSS ANEMIA: Primary | ICD-10-CM

## 2023-08-21 LAB
BASOPHILS # BLD AUTO: 0.05 X10(3)/MCL
BASOPHILS NFR BLD AUTO: 0.5 %
EOSINOPHIL # BLD AUTO: 0.3 X10(3)/MCL (ref 0–0.9)
EOSINOPHIL NFR BLD AUTO: 2.9 %
ERYTHROCYTE [DISTWIDTH] IN BLOOD BY AUTOMATED COUNT: 15.7 % (ref 11.5–17)
HCT VFR BLD AUTO: 28.5 % (ref 42–52)
HGB BLD-MCNC: 9.4 G/DL (ref 14–18)
IMM GRANULOCYTES # BLD AUTO: 0.16 X10(3)/MCL (ref 0–0.04)
IMM GRANULOCYTES NFR BLD AUTO: 1.5 %
LYMPHOCYTES # BLD AUTO: 2.15 X10(3)/MCL (ref 0.6–4.6)
LYMPHOCYTES NFR BLD AUTO: 20.7 %
MCH RBC QN AUTO: 27.5 PG (ref 27–31)
MCHC RBC AUTO-ENTMCNC: 33 G/DL (ref 33–36)
MCV RBC AUTO: 83.3 FL (ref 80–94)
MONOCYTES # BLD AUTO: 0.8 X10(3)/MCL (ref 0.1–1.3)
MONOCYTES NFR BLD AUTO: 7.7 %
NEUTROPHILS # BLD AUTO: 6.95 X10(3)/MCL (ref 2.1–9.2)
NEUTROPHILS NFR BLD AUTO: 66.7 %
NRBC BLD AUTO-RTO: 0 %
PLATELET # BLD AUTO: 403 X10(3)/MCL (ref 130–400)
PMV BLD AUTO: 10 FL (ref 7.4–10.4)
RBC # BLD AUTO: 3.42 X10(6)/MCL (ref 4.7–6.1)
WBC # SPEC AUTO: 10.41 X10(3)/MCL (ref 4.5–11.5)

## 2023-08-21 PROCEDURE — 86923 COMPATIBILITY TEST ELECTRIC: CPT | Performed by: HOSPITALIST

## 2023-08-21 PROCEDURE — 99285 EMERGENCY DEPT VISIT HI MDM: CPT

## 2023-08-21 PROCEDURE — 85730 THROMBOPLASTIN TIME PARTIAL: CPT | Performed by: EMERGENCY MEDICINE

## 2023-08-21 PROCEDURE — 86923 COMPATIBILITY TEST ELECTRIC: CPT | Mod: 91 | Performed by: STUDENT IN AN ORGANIZED HEALTH CARE EDUCATION/TRAINING PROGRAM

## 2023-08-21 PROCEDURE — 85025 COMPLETE CBC W/AUTO DIFF WBC: CPT | Performed by: EMERGENCY MEDICINE

## 2023-08-21 PROCEDURE — 86900 BLOOD TYPING SEROLOGIC ABO: CPT | Performed by: EMERGENCY MEDICINE

## 2023-08-21 PROCEDURE — 80053 COMPREHEN METABOLIC PANEL: CPT | Performed by: EMERGENCY MEDICINE

## 2023-08-21 PROCEDURE — 85610 PROTHROMBIN TIME: CPT | Performed by: EMERGENCY MEDICINE

## 2023-08-22 LAB
ABO + RH BLD: NORMAL
ABORH RETYPE: NORMAL
ALBUMIN SERPL-MCNC: 3.2 G/DL (ref 3.4–4.8)
ALBUMIN/GLOB SERPL: 0.9 RATIO (ref 1.1–2)
ALP SERPL-CCNC: 57 UNIT/L (ref 40–150)
ALT SERPL-CCNC: 21 UNIT/L (ref 0–55)
APTT PPP: 44.5 SECONDS (ref 23.2–33.7)
AST SERPL-CCNC: 17 UNIT/L (ref 5–34)
BILIRUB SERPL-MCNC: 0.3 MG/DL
BLD PROD TYP BPU: NORMAL
BLOOD UNIT EXPIRATION DATE: NORMAL
BLOOD UNIT TYPE CODE: 6200
BUN SERPL-MCNC: 28.9 MG/DL (ref 8.4–25.7)
CALCIUM SERPL-MCNC: 9.1 MG/DL (ref 8.8–10)
CHLORIDE SERPL-SCNC: 103 MMOL/L (ref 98–107)
CO2 SERPL-SCNC: 30 MMOL/L (ref 23–31)
CREAT SERPL-MCNC: 1.39 MG/DL (ref 0.73–1.18)
CROSSMATCH INTERPRETATION: NORMAL
DISPENSE STATUS: NORMAL
FERRITIN SERPL-MCNC: 260.79 NG/ML (ref 21.81–274.66)
FOLATE SERPL-MCNC: 14.6 NG/ML (ref 7–31.4)
GFR SERPLBLD CREATININE-BSD FMLA CKD-EPI: 49 MLS/MIN/1.73/M2
GLOBULIN SER-MCNC: 3.6 GM/DL (ref 2.4–3.5)
GLUCOSE SERPL-MCNC: 116 MG/DL (ref 82–115)
GROUP & RH: NORMAL
HCT VFR BLD AUTO: 22.2 % (ref 42–52)
HCT VFR BLD AUTO: 25.4 % (ref 42–52)
HGB BLD-MCNC: 7.1 G/DL (ref 14–18)
HGB BLD-MCNC: 8.3 G/DL (ref 14–18)
INDIRECT COOMBS GEL: NORMAL
INR PPP: 2.6
IRON SATN MFR SERPL: 18 % (ref 20–50)
IRON SERPL-MCNC: 48 UG/DL (ref 65–175)
POTASSIUM SERPL-SCNC: 4.1 MMOL/L (ref 3.5–5.1)
PROT SERPL-MCNC: 6.8 GM/DL (ref 5.8–7.6)
PROTHROMBIN TIME: 27.6 SECONDS (ref 12.5–14.5)
SODIUM SERPL-SCNC: 142 MMOL/L (ref 136–145)
SPECIMEN OUTDATE: NORMAL
TIBC SERPL-MCNC: 225 UG/DL (ref 69–240)
TIBC SERPL-MCNC: 273 UG/DL (ref 250–450)
TRANSFERRIN SERPL-MCNC: 240 MG/DL
UNIT NUMBER: NORMAL
VIT B12 SERPL-MCNC: 288 PG/ML (ref 213–816)

## 2023-08-22 PROCEDURE — 63600175 PHARM REV CODE 636 W HCPCS: Performed by: NURSE PRACTITIONER

## 2023-08-22 PROCEDURE — P9016 RBC LEUKOCYTES REDUCED: HCPCS | Performed by: HOSPITALIST

## 2023-08-22 PROCEDURE — 25000242 PHARM REV CODE 250 ALT 637 W/ HCPCS: Performed by: HOSPITALIST

## 2023-08-22 PROCEDURE — 94640 AIRWAY INHALATION TREATMENT: CPT

## 2023-08-22 PROCEDURE — 96361 HYDRATE IV INFUSION ADD-ON: CPT

## 2023-08-22 PROCEDURE — 36430 TRANSFUSION BLD/BLD COMPNT: CPT

## 2023-08-22 PROCEDURE — 82607 VITAMIN B-12: CPT | Performed by: NURSE PRACTITIONER

## 2023-08-22 PROCEDURE — 25000003 PHARM REV CODE 250: Performed by: HOSPITALIST

## 2023-08-22 PROCEDURE — 25000003 PHARM REV CODE 250: Performed by: NURSE PRACTITIONER

## 2023-08-22 PROCEDURE — 25500020 PHARM REV CODE 255: Performed by: NURSE PRACTITIONER

## 2023-08-22 PROCEDURE — 99900031 HC PATIENT EDUCATION (STAT)

## 2023-08-22 PROCEDURE — 85014 HEMATOCRIT: CPT | Performed by: HOSPITALIST

## 2023-08-22 PROCEDURE — 82746 ASSAY OF FOLIC ACID SERUM: CPT | Performed by: NURSE PRACTITIONER

## 2023-08-22 PROCEDURE — 82728 ASSAY OF FERRITIN: CPT | Performed by: NURSE PRACTITIONER

## 2023-08-22 PROCEDURE — P9017 PLASMA 1 DONOR FRZ W/IN 8 HR: HCPCS | Performed by: INTERNAL MEDICINE

## 2023-08-22 PROCEDURE — 99900035 HC TECH TIME PER 15 MIN (STAT)

## 2023-08-22 PROCEDURE — C9113 INJ PANTOPRAZOLE SODIUM, VIA: HCPCS | Performed by: HOSPITALIST

## 2023-08-22 PROCEDURE — 63600175 PHARM REV CODE 636 W HCPCS: Performed by: STUDENT IN AN ORGANIZED HEALTH CARE EDUCATION/TRAINING PROGRAM

## 2023-08-22 PROCEDURE — 94761 N-INVAS EAR/PLS OXIMETRY MLT: CPT

## 2023-08-22 PROCEDURE — 96374 THER/PROPH/DIAG INJ IV PUSH: CPT

## 2023-08-22 PROCEDURE — 83540 ASSAY OF IRON: CPT | Performed by: NURSE PRACTITIONER

## 2023-08-22 PROCEDURE — 11000001 HC ACUTE MED/SURG PRIVATE ROOM

## 2023-08-22 PROCEDURE — 85014 HEMATOCRIT: CPT | Performed by: INTERNAL MEDICINE

## 2023-08-22 PROCEDURE — C9113 INJ PANTOPRAZOLE SODIUM, VIA: HCPCS | Performed by: STUDENT IN AN ORGANIZED HEALTH CARE EDUCATION/TRAINING PROGRAM

## 2023-08-22 PROCEDURE — 21400001 HC TELEMETRY ROOM

## 2023-08-22 PROCEDURE — 63600175 PHARM REV CODE 636 W HCPCS: Performed by: HOSPITALIST

## 2023-08-22 PROCEDURE — 25000003 PHARM REV CODE 250: Performed by: PHYSICIAN ASSISTANT

## 2023-08-22 RX ORDER — HYDROCODONE BITARTRATE AND ACETAMINOPHEN 500; 5 MG/1; MG/1
TABLET ORAL
Status: DISCONTINUED | OUTPATIENT
Start: 2023-08-22 | End: 2023-08-26 | Stop reason: HOSPADM

## 2023-08-22 RX ORDER — PANTOPRAZOLE SODIUM 40 MG/10ML
40 INJECTION, POWDER, LYOPHILIZED, FOR SOLUTION INTRAVENOUS DAILY
Status: DISCONTINUED | OUTPATIENT
Start: 2023-08-22 | End: 2023-08-26 | Stop reason: HOSPADM

## 2023-08-22 RX ORDER — PANTOPRAZOLE SODIUM 40 MG/10ML
80 INJECTION, POWDER, LYOPHILIZED, FOR SOLUTION INTRAVENOUS
Status: COMPLETED | OUTPATIENT
Start: 2023-08-22 | End: 2023-08-22

## 2023-08-22 RX ORDER — LEVALBUTEROL INHALATION SOLUTION 0.63 MG/3ML
0.63 SOLUTION RESPIRATORY (INHALATION) EVERY 8 HOURS
Status: DISCONTINUED | OUTPATIENT
Start: 2023-08-22 | End: 2023-08-23

## 2023-08-22 RX ORDER — FLUTICASONE FUROATE AND VILANTEROL 100; 25 UG/1; UG/1
1 POWDER RESPIRATORY (INHALATION) DAILY
Status: DISCONTINUED | OUTPATIENT
Start: 2023-08-22 | End: 2023-08-26 | Stop reason: HOSPADM

## 2023-08-22 RX ORDER — SODIUM CHLORIDE, SODIUM LACTATE, POTASSIUM CHLORIDE, CALCIUM CHLORIDE 600; 310; 30; 20 MG/100ML; MG/100ML; MG/100ML; MG/100ML
INJECTION, SOLUTION INTRAVENOUS CONTINUOUS
Status: DISCONTINUED | OUTPATIENT
Start: 2023-08-22 | End: 2023-08-23

## 2023-08-22 RX ORDER — SODIUM, POTASSIUM,MAG SULFATES 17.5-3.13G
1 SOLUTION, RECONSTITUTED, ORAL ORAL ONCE
Status: COMPLETED | OUTPATIENT
Start: 2023-08-22 | End: 2023-08-22

## 2023-08-22 RX ORDER — ONDANSETRON 4 MG/1
4 TABLET, ORALLY DISINTEGRATING ORAL EVERY 6 HOURS PRN
Status: DISCONTINUED | OUTPATIENT
Start: 2023-08-22 | End: 2023-08-23

## 2023-08-22 RX ADMIN — SODIUM CHLORIDE 125 MG: 9 INJECTION, SOLUTION INTRAVENOUS at 07:08

## 2023-08-22 RX ADMIN — SODIUM CHLORIDE: 9 INJECTION, SOLUTION INTRAVENOUS at 06:08

## 2023-08-22 RX ADMIN — PANTOPRAZOLE SODIUM 40 MG: 40 INJECTION, POWDER, LYOPHILIZED, FOR SOLUTION INTRAVENOUS at 11:08

## 2023-08-22 RX ADMIN — IOPAMIDOL 100 ML: 755 INJECTION, SOLUTION INTRAVENOUS at 12:08

## 2023-08-22 RX ADMIN — METOPROLOL SUCCINATE 75 MG: 50 TABLET, EXTENDED RELEASE ORAL at 11:08

## 2023-08-22 RX ADMIN — SODIUM SULFATE, POTASSIUM SULFATE, MAGNESIUM SULFATE 354 ML: 17.5; 3.13; 1.6 SOLUTION, CONCENTRATE ORAL at 05:08

## 2023-08-22 RX ADMIN — FLUTICASONE FUROATE AND VILANTEROL TRIFENATATE 1 PUFF: 100; 25 POWDER RESPIRATORY (INHALATION) at 03:08

## 2023-08-22 RX ADMIN — LEVALBUTEROL HYDROCHLORIDE 0.63 MG: 0.63 SOLUTION RESPIRATORY (INHALATION) at 04:08

## 2023-08-22 RX ADMIN — SODIUM CHLORIDE, POTASSIUM CHLORIDE, SODIUM LACTATE AND CALCIUM CHLORIDE 1000 ML: 600; 310; 30; 20 INJECTION, SOLUTION INTRAVENOUS at 01:08

## 2023-08-22 RX ADMIN — SODIUM CHLORIDE, POTASSIUM CHLORIDE, SODIUM LACTATE AND CALCIUM CHLORIDE: 600; 310; 30; 20 INJECTION, SOLUTION INTRAVENOUS at 06:08

## 2023-08-22 RX ADMIN — PANTOPRAZOLE SODIUM 80 MG: 40 INJECTION, POWDER, LYOPHILIZED, FOR SOLUTION INTRAVENOUS at 01:08

## 2023-08-22 NOTE — PLAN OF CARE
Pt is  4 children, no living will or POA. Denied known needs before dc    08/22/23 1023   Discharge Assessment   Assessment Type Discharge Planning Assessment   Confirmed/corrected address, phone number and insurance Yes   Confirmed Demographics Correct on Facesheet   Source of Information patient   [facility]   Communicated ANNY with patient/caregiver Date not available/Unable to determine   People in Home alone   Do you expect to return to your current living situation? Yes   Do you have help at home or someone to help you manage your care at home? Yes   Who are your caregiver(s) and their phone number(s)? daughter Sandra Melendez [phone]/ [facility]   Prior to hospitilization cognitive status: Unable to Assess   Current cognitive status: Alert/Oriented   Walking or Climbing Stairs   (None prior to recent admit)   Dressing/Bathing   (None prior to admit)   Home Accessibility stairs to enter home;stairs within home   Stairs, Within Home, Primary 1   Number of Stairs, Main Entrance three   Home Layout Able to live on 1st floor   Equipment Currently Used at Home blood pressure machine;nebulizer   Readmission within 30 days? Yes   Patient currently being followed by outpatient case management? Yes  ([facility], [facility])   Do you currently have service(s) that help you manage your care at home? Yes   Name and Contact number of agency [facility]   Is the pt/caregiver preference to resume services with current agency Yes   Do you take prescription medications? Yes  ([facility]   Do you have prescription coverage? Yes   [facility]   Do you have any problems affording any of your prescribed medications? No   Is the patient taking medications as prescribed? yes   Who is going to help you get home at discharge? friend   How do you get to doctors appointments? family or friend will provide;car, drives self   Are you on dialysis? No   Do you take coumadin? No   DME Needed Upon Discharge   other (see comments)  (TBD)   Discharge Plan discussed with: Patient   Transition of Care Barriers None   Discharge Plan A Home Health

## 2023-08-22 NOTE — ED PROVIDER NOTES
Encounter Date: 8/21/2023       History     Chief Complaint   Patient presents with    GI Bleeding     Pt reports having blood in his stool this afternoon after taking blood thinners for the first time today after being started on them for A fib and stroke.      See MDM    No  was used.     Review of patient's allergies indicates:   Allergen Reactions    Colestipol     Meloxicam     Rosuvastatin     Simvastatin     Statins-hmg-coa reductase inhibitors     Tramadol      Past Medical History:   Diagnosis Date    A-fib     CHF (congestive heart failure)     Chronic pain     Hypercholesterolemia     Stroke      Past Surgical History:   Procedure Laterality Date    COLONOSCOPY N/A 8/16/2023    Procedure: COLON;  Surgeon: Jennifer Santillan III, MD;  Location: [facility] ENDOSCOPY;  Service: Gastroenterology;  Laterality: N/A;    COLONOSCOPY N/A 8/16/2023    Procedure: COLONOSCOPY, WITH HEMORRHAGE CONTROL;  Surgeon: Jennifer Santillan III, MD;  Location: [facility] ENDOSCOPY;  Service: Gastroenterology;  Laterality: N/A;    COLONOSCOPY, WITH POLYPECTOMY USING SNARE N/A 8/16/2023    Procedure: COLONOSCOPY, WITH POLYPECTOMY USING SNARE;  Surgeon: Jennifer Santillan III, MD;  Location: [facility] ENDOSCOPY;  Service: Gastroenterology;  Laterality: N/A;    FRACTURE SURGERY      TIBIA FRACTURE SURGERY Right      Family History   Problem Relation Age of Onset    Hypertension Mother     Heart disease Mother     Hyperlipidemia Mother     Cancer Mother     COPD Mother     Dementia Father      Social History     Tobacco Use    Smoking status: Never    Smokeless tobacco: Never   Substance Use Topics    Alcohol use: Yes     Comment: Has cut down from 6 to a couple of beer now    Drug use: Never     Review of Systems   Constitutional:  Negative for fever.   Respiratory:  Negative for cough and shortness of breath.    Cardiovascular:  Negative for chest pain.   Gastrointestinal:  Positive for blood in stool. Negative for  abdominal pain.   Genitourinary:  Negative for difficulty urinating and dysuria.   Musculoskeletal:  Negative for gait problem.   Skin:  Negative for color change.   Neurological:  Negative for dizziness, speech difficulty and headaches.   Psychiatric/Behavioral:  Negative for hallucinations and suicidal ideas.    All other systems reviewed and are negative.      Physical Exam     Initial Vitals [08/21/23 2217]   BP Pulse Resp Temp SpO2   132/75 77 18 98 °F (36.7 °C) 95 %      MAP       --         Physical Exam    Nursing note and vitals reviewed.  Constitutional: He appears well-developed and well-nourished.   HENT:   Head: Normocephalic.   Eyes: EOM are normal.   Neck: Neck supple.   Normal range of motion.  Cardiovascular:  Normal rate, regular rhythm, normal heart sounds and intact distal pulses.           Pulmonary/Chest: Breath sounds normal.   Abdominal: Abdomen is soft. Bowel sounds are normal.   Musculoskeletal:         General: Normal range of motion.      Cervical back: Normal range of motion and neck supple.     Neurological: He is alert and oriented to person, place, and time. He has normal strength.   Skin: Skin is warm and dry. Capillary refill takes less than 2 seconds.   Psychiatric: He has a normal mood and affect. His behavior is normal. Judgment and thought content normal.         ED Course   Procedures  Labs Reviewed   COMPREHENSIVE METABOLIC PANEL - Abnormal; Notable for the following components:       Result Value    Glucose Level 116 (*)     Blood Urea Nitrogen 28.9 (*)     Creatinine 1.39 (*)     Albumin Level 3.2 (*)     Globulin 3.6 (*)     Albumin/Globulin Ratio 0.9 (*)     All other components within normal limits   PROTIME-INR - Abnormal; Notable for the following components:    PT 27.6 (*)     INR 2.6 (*)     All other components within normal limits   APTT - Abnormal; Notable for the following components:    PTT 44.5 (*)     All other components within normal limits   CBC WITH  DIFFERENTIAL - Abnormal; Notable for the following components:    RBC 3.42 (*)     Hgb 9.4 (*)     Hct 28.5 (*)     Platelet 403 (*)     IG# 0.16 (*)     All other components within normal limits   CBC W/ AUTO DIFFERENTIAL    Narrative:     The following orders were created for panel order CBC auto differential.  Procedure                               Abnormality         Status                     ---------                               -----------         ------                     CBC with Differential[[id number]]        Abnormal            Final result                 Please view results for these tests on the individual orders.   HEMOGLOBIN AND HEMATOCRIT, BLOOD   TYPE & SCREEN   ABORH RETYPE          Imaging Results              CT Abdomen Pelvis W Wo Contrast (Preliminary result)  Result time 08/22/23 00:37:25      Preliminary result by Cristian Tubbs MD (08/22/23 00:37:25)                   Narrative:    START OF REPORT:  Technique: CT of the abdomen and pelvis was performed with axial images as well as sagittal and coronal reconstruction images without and with intravenous contrast.    Comparison: None available.    Clinical History: Gi bleeding after taking blood thinners today.    Dosage Information: Automated Exposure Control was utilized.    Findings:  Thorax: Calcified subcarinal and right hilar lymph nodes are seen.  Lungs: Mild subpleural reticular opacities are seen in bilateral lower lobes. There is a 5 mm calcified nodule in the right lower lobe.  Pleura: There is a small left pleural effusion.  Heart: Moderate cardiomegaly is seen. Trace pericardial effusion is seen.  Abdomen:  Abdominal Wall: No abdominal wall pathology is seen.  Liver: The liver appears unremarkable.  Biliary System: No intrahepatic or extrahepatic biliary duct dilatation is seen.  Gallbladder: The gallbladder appears unremarkable.  Pancreas: The pancreas appears unremarkable.  Spleen: Multiple calcified granulomas are seen in  an otherwise unremarkable appearing spleen.  Adrenals: The adrenal glands appear unremarkable.  Kidneys: A single cyst measuring 1.4 cm is seen on series 14, image 35 in the mid pole of the left kidney. The left kidney otherwise appears unremarkable with no stones masses or hydronephrosis identified. Two cysts are identified in the right kidney the largest of which measures 2.4 x 2.1 cm is seen on series 14, image 33 in the mid pole of the right kidney. The right kidney otherwise appears unremarkable with no stones masses or hydronephrosis identified. On the excretory phase images, there is symmetric excretion of contrast and no filling defects are identified in the collecting systems.  Aorta: There is moderate calcification of the abdominal aorta and its branches.  IVC: Unremarkable.  Bowel: Contrast pooling is identified within the cecum on the venous phase of the study (series 14, images 48-53 and series 15, images 45-50), consistent with active gastrointestinal bleed. No bowel wall thickening is seen.  Esophagus: There is a small hiatal hernia.  Stomach: The stomach appears unremarkable.  Duodenum: Unremarkable appearing duodenum.  Small Bowel: The small bowel appears unremarkable.  Colon: Multiple diverticula are seen in the colon. No associated inflammatory stranding or pericolonic fluid is seen to suggest diverticulitis.  Appendix: The appendix appears unremarkable (series 14, images 55-57).  Peritoneum: No intraperitoneal free air or ascites is seen.    Pelvis:  Bladder: The bladder appears unremarkable.  Male:  Prostate gland: The prostate gland appears unremarkable. There are a few calcifications in the prostate gland.    Bony structures:  Dorsal Spine: There is moderate spondylosis of the visualized dorsal spine.  Bony Pelvis: The visualized bony structures of the pelvis appear unremarkable.    Notifications: The results were discussed with the emergency room physician (Dr Rao) prior to dictation at  2023-08-22 01:23:15 CDT.      Impression:  1. There is a small hiatal hernia.  2. Contrast pooling is identified within the cecum on the venous phase of the study (series 14, images 48-53 and series 15, images 45-50), consistent with active gastrointestinal bleed. No bowel wall thickening is seen. Correlate clinically as regards further evaluation and followup.  3. There is a small left pleural effusion.  4. Mild subpleural reticular opacities are seen in bilateral lower lobes.  5. Details and other findings as discussed above.                                         Medications   iopamidoL (ISOVUE-370) injection 100 mL (100 mLs Intravenous Given 8/22/23 0038)   lactated ringers bolus 1,000 mL (0 mLs Intravenous Stopped 8/22/23 0239)   pantoprazole injection 80 mg (80 mg Intravenous Given 8/22/23 0139)     Medical Decision Making  Historian:  Patient.  Patient is a 88-year-old male  that presents with blood in stool that has been present today. Associated symptoms nothing. Surrounding information is started blood thinner today. Exacerbated by nothing. Relieved by nothing. Patient treatment prior to arrival none. Risk factors include none. Other history pertaining to this complaint nothing.   Assessment:  See physical exam.  Different diagnosis:  Anticoagulation, GI bleed, ulcer, diverticulitis      Amount and/or Complexity of Data Reviewed  Radiology: ordered.    Risk  Prescription drug management.  Decision regarding hospitalization.               ED Course as of 08/22/23 0328   Tue Aug 22, 2023   0159 GI consulted and will evaluate the patient. [MC]   0318 I had face to face time with this patient and independently performed a history and physical exam. I was involved in a substantive portion of this patient's ED visit and MDM I have discussed the assessment, plan, and results with both midlevel provider and patient. I agree with the assessment and plan of the midlevel provider. I agree with the history and  physical exam as well as the differential/final diagnosis and disposition as documented in the chart by the midlevel provider Laureano Wilhelm.     HPI:  88-year-old gentleman presents for evaluation of gastrointestinal bleeding, he states he noticed some blood in his stool this afternoon.  He states he recently was started on blood thinners for his atrial fibrillation.  He denies any lightheadedness or dizziness.  He denies any chest pain or shortness of breath.  He denies any other sources of bleeding.    Physical exam:   GEN: Awake, alert, no distress, pale  HEENT: NC/AT  CV: RRR, no murmurs   PULM: Lungs CTAB, normal work of breathing  ABD: Abd soft, NTND  NEURO: GCS15, no focal neurologic deficits    MDM:  Labs reviewed, hemoglobin 9.4 from a baseline of 10-11.  CT scan shows contrast blush with evidence of active bleeding at the cecum.  Gastroenterology has been consulted and will evaluate the patient.  Patient made NPO.  Type and cross completed, will trend H and H, transfuse as needed.  Hospital Medicine consulted and has accepted the patient.  We will hold off on anticoagulation at this time unless patient becomes hemodynamically unstable, however we will hold off on any further doses of the patient's anticoagulants at this time.      Anup Rao MD [MC]      ED Course User Index  [MC] Anup Rao MD                    Clinical Impression:   Final diagnoses:  [D62] Acute blood loss anemia (Primary)  [K92.2] Acute lower GI bleeding  [K92.2] Lower gastrointestinal bleeding        ED Disposition Condition    Admit Stable                Anup Rao MD  08/22/23 5417

## 2023-08-22 NOTE — H&P
[facility] Medicine History & Physical Examination       Patient Name: Lizandro Martinez  MRN: [id number]  Patient Class: IP- Inpatient   Admission Date: 8/21/2023   Admitting Physician: VAL Service   Attending Physician: Giovanny Salgado MD  [facility] Care Provider: [facility]  Face-to-Face encounter date: 08/22/2023  Code Status:Code Status Discussion Note   Chief Complaint: GI Bleeding (Pt reports having blood in his stool this afternoon after taking blood thinners for the first time today after being started on them for A fib and stroke. )          Patient information was obtained from patient, patient's family, past medical records and ER records.     HISTORY OF PRESENT ILLNESS:   Lizandro Martinez is a 88 y.o. male who  has a past medical history of A-fib, CHF (congestive heart failure), Chronic pain, Hypercholesterolemia, and Stroke.. The patient presented to [facility] on 8/21/2023     88-year-old male with past medical history of chronic systolic heart failure, atrial fibrillation on Xarelto, CAD, hyperlipidemia, and previous stroke presents to the ER after having a bloody bowel movement after recently starting his Xarelto.  He was recently admitted from 08/11 to 8/18 after a heart failure exacerbation and before that was in the hospital from 08/04 days/6 for new onset atrial fibrillation.  During the 1st visit he was started on Xarelto.  Seems that he had some bleeding after discharge.  GI evaluated and since his hemoglobin stable they did not perform any initial endoscopy.  During his stay he continued to have dark bowel movements in hemoglobin was slightly lower.  Aspirin Xarelto was held.  A colonoscopy on 08/16 which showed medium-sized angiodysplastic lesion was stigmata of recent bleeding in the ascending colon and cecum.  These were treated with argon plasma anticoagulation.  He also had diffuse diverticulosis.  GI cleared him to resume his anticoagulation which  she did on 8/20.  He states that next day he started seeing dark bowel movements again with some streaks of bright red blood and presented to the emergency room.  In the ER his vital signs are stable.  Does show iron deficiency on labs and his hemoglobin has dropped from 10 previously to 8.3 today.  GI has already been consulted and patient is currently NPO.  Hospitalist group was asked to admit    PAST MEDICAL HISTORY:     Past Medical History:   Diagnosis Date    A-fib     CHF (congestive heart failure)     Chronic pain     Hypercholesterolemia     Stroke        PAST SURGICAL HISTORY:     Past Surgical History:   Procedure Laterality Date    COLONOSCOPY N/A 8/16/2023    Procedure: COLON;  Surgeon: Jennifer Santillan III, MD;  Location: [facility] ENDOSCOPY;  Service: Gastroenterology;  Laterality: N/A;    COLONOSCOPY N/A 8/16/2023    Procedure: COLONOSCOPY, WITH HEMORRHAGE CONTROL;  Surgeon: Jennifer Santillan III, MD;  Location: [facility] ENDOSCOPY;  Service: Gastroenterology;  Laterality: N/A;    COLONOSCOPY, WITH POLYPECTOMY USING SNARE N/A 8/16/2023    Procedure: COLONOSCOPY, WITH POLYPECTOMY USING SNARE;  Surgeon: Jennifer Santillan III, MD;  Location: [facility] ENDOSCOPY;  Service: Gastroenterology;  Laterality: N/A;    FRACTURE SURGERY      TIBIA FRACTURE SURGERY Right        ALLERGIES:   Colestipol, Meloxicam, Rosuvastatin, Simvastatin, Statins-hmg-coa reductase inhibitors, and Tramadol    FAMILY HISTORY:   Reviewed and negative    SOCIAL HISTORY:     Social History     Tobacco Use    Smoking status: Never    Smokeless tobacco: Never   Substance Use Topics    Alcohol use: Yes     Comment: Has cut down from 6 to a couple of beer now        HOME MEDICATIONS:     Prior to Admission medications    Medication Sig Start Date End Date Taking? Authorizing Provider   furosemide (LASIX) 40 MG tablet Take 1 tablet (40 mg total) by mouth once daily. 8/19/23 11/17/23 Yes Kemar Goldman MD   metoprolol succinate (TOPROL-XL)  25 MG 24 hr tablet Take 3 tablets (75 mg total) by mouth once daily. 8/6/23 11/4/23 Yes Giovanny Salgado MD   rivaroxaban (XARELTO) 20 mg Tab Take 1 tablet (20 mg total) by mouth daily with dinner or evening meal. 8/6/23 9/5/23 Yes Giovanny Salgado MD   valsartan (DIOVAN) 40 MG tablet Take 1 tablet (40 mg total) by mouth once daily. 8/14/23 8/13/24 Yes Nila Angel, CHARLIE   aspirin 81 MG Chew Take 1 tablet (81 mg total) by mouth once daily. 8/14/23 9/13/23  Kaylin Mohan MD   fluticasone furoate-vilanteroL (BREO) 100-25 mcg/dose diskus inhaler Inhale 1 puff into the lungs once daily. Controller 8/14/23 9/13/23  Kaylin Mohan MD   guaiFENesin 100 mg/5 ml (ROBITUSSIN) 100 mg/5 mL syrup Take 10 mLs (200 mg total) by mouth 4 (four) times daily as needed for Congestion or Cough. 8/14/23 8/24/23  Kaylin Mohan MD   hawthorn 500 mg Cap Take 1 capsule by mouth.    Provider, Historical   levalbuterol (XOPENEX) 0.63 mg/3 mL nebulizer solution Take 3 mLs (0.63 mg total) by nebulization every 8 (eight) hours. Rescue 8/6/23 9/5/23  Giovanny Salgado MD   pantoprazole (PROTONIX) 40 MG tablet Take 1 tablet (40 mg total) by mouth 2 (two) times daily before meals. 8/14/23 9/13/23  Kaylin Mohan MD   sucralfate (CARAFATE) 1 gram tablet Take 1 tablet (1 g total) by mouth 4 (four) times daily before meals and nightly. 8/14/23 9/13/23  Kaylin Mohan MD       REVIEW OF SYSTEMS:   Except as documented, all other systems reviewed and negative     PHYSICAL EXAM:     VITAL SIGNS: 24 HRS MIN & MAX LAST   Temp  Min: 97.7 °F (36.5 °C)  Max: 98.1 °F (36.7 °C) 98.1 °F (36.7 °C)   BP  Min: 99/52  Max: 138/68 109/67   Pulse  Min: 64  Max: 77  66   Resp  Min: 18  Max: 23 18   SpO2  Min: 95 %  Max: 99 % 95 %       General appearance: Well-developed, well-nourished male in no apparent distress.  HENT: Atraumatic head. Moist mucous membranes of oral cavity.  Eyes: Normal extraocular movements.   Neck: Supple.   Lungs: Clear to auscultation  bilaterally. No wheezing present.   Heart: Regular rate and rhythm. S1 and S2 present with no murmurs/gallop/rub. No pedal edema. No JVD present.   Abdomen: Soft, non-distended, non-tender. No rebound tenderness/guarding. Bowel sounds are normal.   Extremities: No cyanosis, clubbing, or edema.  Skin: No Rash.   Neuro: Motor and sensory exams grossly intact. Good tone. Muscle strength 5/5 in all 4 extremities  Psych/mental status: Appropriate mood and affect. Responds appropriately to questions.     LABS AND IMAGING:     Recent Labs   Lab 08/17/23 0349 08/18/23 0407 08/21/23 2336 08/22/23  0309   WBC 11.01 14.94* 10.41  --    RBC 4.10* 3.91* 3.42*  --    HGB 11.0* 10.6* 9.4* 8.3*   HCT 33.5* 32.2* 28.5* 25.4*   MCV 81.7 82.4 83.3  --    MCH 26.8* 27.1 27.5  --    MCHC 32.8* 32.9* 33.0  --    RDW 15.4 15.2 15.7  --    * 482* 403*  --    MPV 8.7 9.1 10.0  --        Recent Labs   Lab 08/17/23 0349 08/18/23 0407 08/21/23 2336    137 142   K 4.1 4.1 4.1   CO2 31 31 30   BUN 20.8 25.8* 28.9*   CREATININE 1.10 1.19* 1.39*   CALCIUM 8.8 9.3 9.1   MG  --  2.00  --    ALBUMIN 2.9* 2.9* 3.2*   ALKPHOS 54 57 57   ALT 31 28 21   AST 26 18 17   BILITOT 0.4 0.3 0.3       Microbiology Results (last 7 days)       ** No results found for the last 168 hours. **             CT Abdomen Pelvis W Wo Contrast  START OF REPORT:  Technique: CT of the abdomen and pelvis was performed with axial images as well as sagittal and coronal reconstruction images without and with intravenous contrast.    Comparison: None available.    Clinical History: Gi bleeding after taking blood thinners today.    Dosage Information: Automated Exposure Control was utilized.    Findings:  Thorax: Calcified subcarinal and right hilar lymph nodes are seen.  Lungs: Mild subpleural reticular opacities are seen in bilateral lower lobes. There is a 5 mm calcified nodule in the right lower lobe.  Pleura: There is a small left pleural effusion.  Heart:  Moderate cardiomegaly is seen. Trace pericardial effusion is seen.  Abdomen:  Abdominal Wall: No abdominal wall pathology is seen.  Liver: The liver appears unremarkable.  Biliary System: No intrahepatic or extrahepatic biliary duct dilatation is seen.  Gallbladder: The gallbladder appears unremarkable.  Pancreas: The pancreas appears unremarkable.  Spleen: Multiple calcified granulomas are seen in an otherwise unremarkable appearing spleen.  Adrenals: The adrenal glands appear unremarkable.  Kidneys: A single cyst measuring 1.4 cm is seen on series 14, image 35 in the mid pole of the left kidney. The left kidney otherwise appears unremarkable with no stones masses or hydronephrosis identified. Two cysts are identified in the right kidney the largest of which measures 2.4 x 2.1 cm is seen on series 14, image 33 in the mid pole of the right kidney. The right kidney otherwise appears unremarkable with no stones masses or hydronephrosis identified. On the excretory phase images, there is symmetric excretion of contrast and no filling defects are identified in the collecting systems.  Aorta: There is moderate calcification of the abdominal aorta and its branches.  IVC: Unremarkable.  Bowel: Contrast pooling is identified within the cecum on the venous phase of the study (series 14, images 48-53 and series 15, images 45-50), consistent with active gastrointestinal bleed. No bowel wall thickening is seen.  Esophagus: There is a small hiatal hernia.  Stomach: The stomach appears unremarkable.  Duodenum: Unremarkable appearing duodenum.  Small Bowel: The small bowel appears unremarkable.  Colon: Multiple diverticula are seen in the colon. No associated inflammatory stranding or pericolonic fluid is seen to suggest diverticulitis.  Appendix: The appendix appears unremarkable (series 14, images 55-57).  Peritoneum: No intraperitoneal free air or ascites is seen.    Pelvis:  Bladder: The bladder appears  unremarkable.  Male:  Prostate gland: The prostate gland appears unremarkable. There are a few calcifications in the prostate gland.    Bony structures:  Dorsal Spine: There is moderate spondylosis of the visualized dorsal spine.  Bony Pelvis: The visualized bony structures of the pelvis appear unremarkable.    Notifications: The results were discussed with the emergency room physician (Dr Rao) prior to dictation at 2023-08-22 01:23:15 CDT.    Impression:  1. There is a small hiatal hernia.  2. Contrast pooling is identified within the cecum on the venous phase of the study (series 14, images 48-53 and series 15, images 45-50), consistent with active gastrointestinal bleed. No bowel wall thickening is seen. Correlate clinically as regards further evaluation and followup.  3. There is a small left pleural effusion.  4. Mild subpleural reticular opacities are seen in bilateral lower lobes.  5. Details and other findings as discussed above.      _____________________________________  INPATIENT LIST OF MEDICATIONS     Current Facility-Administered Medications:     0.9%  NaCl infusion (for blood administration), , Intravenous, Q24H PRN, Aurora Fernández, FNP    lactated ringers infusion, , Intravenous, Continuous, Aurora Fernández, MARSHAP    Current Outpatient Medications:     furosemide (LASIX) 40 MG tablet, Take 1 tablet (40 mg total) by mouth once daily., Disp: 30 tablet, Rfl: 2    metoprolol succinate (TOPROL-XL) 25 MG 24 hr tablet, Take 3 tablets (75 mg total) by mouth once daily., Disp: 90 tablet, Rfl: 2    rivaroxaban (XARELTO) 20 mg Tab, Take 1 tablet (20 mg total) by mouth daily with dinner or evening meal., Disp: 30 tablet, Rfl: 0    valsartan (DIOVAN) 40 MG tablet, Take 1 tablet (40 mg total) by mouth once daily., Disp: 90 tablet, Rfl: 3    aspirin 81 MG Chew, Take 1 tablet (81 mg total) by mouth once daily., Disp: 30 tablet, Rfl: 0    fluticasone furoate-vilanteroL (BREO) 100-25 mcg/dose diskus inhaler, Inhale  1 puff into the lungs once daily. Controller, Disp: 30 each, Rfl: 0    guaiFENesin 100 mg/5 ml (ROBITUSSIN) 100 mg/5 mL syrup, Take 10 mLs (200 mg total) by mouth 4 (four) times daily as needed for Congestion or Cough., Disp: 100 mL, Rfl: 0    hawthorn 500 mg Cap, Take 1 capsule by mouth., Disp: , Rfl:     levalbuterol (XOPENEX) 0.63 mg/3 mL nebulizer solution, Take 3 mLs (0.63 mg total) by nebulization every 8 (eight) hours. Rescue, Disp: 270 mL, Rfl: 0    pantoprazole (PROTONIX) 40 MG tablet, Take 1 tablet (40 mg total) by mouth 2 (two) times daily before meals., Disp: 60 tablet, Rfl: 0    sucralfate (CARAFATE) 1 gram tablet, Take 1 tablet (1 g total) by mouth 4 (four) times daily before meals and nightly., Disp: 120 tablet, Rfl: 0      Scheduled Meds:    Continuous Infusions:   lactated ringers       PRN Meds:.0.9%  NaCl infusion (for blood administration)      VTE Prophylaxis: will be placed on appropriate DVT prophylaxis and will be advised to be as mobile as possible and sit in a chair as tolerated  _____________________________________    ASSESSMENT & PLAN:   Acute GI bleeding   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  GI has been consulted.  Will continue to hold his Xarelto for now.  Will see if he will need a another scope.  Vital signs are stable.  Will continue with IV fluids.  Has already been started on Protonix and Carafate.    Will hold his home blood pressure medications for now except for beta-blocker as his blood pressure is a little soft with ongoing bleeding.  Repeat hemoglobin in a couple hours.  Plan to transfuse if drops below 8.    He would benefit from some iron infusions as well.  Will initiate this morning.      Critical care diagnosis: critical anemia requiring blood transfusion  Critical care interventions:  hands on evaluation, review of labs/radiographs/records and discussions with family  Critical care time spent: >32 minutes

## 2023-08-22 NOTE — CONSULTS
Consult Note    Reason for Consult:      We were consulted by Dr. Kwon to evaluate this patient for GI bleeding.    HPI:     89 yo CM known to Dr. Benjamin from recent admission.  Patient with a pmhx of HTN, HLD, tobacco use, HFrEF 35%, recent dx of afib early this month for which Xarelto was started, then an admission 8/11-8/18 with GI bleeding.  Initially, patient reported melena.  Hgb was stable around 11.5.  No endoscopy was performed.  Patient's Xarleto was resumed on 8/13.  Two day later, he developed hematochezia and hgb downtrended to 10.6.  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  He had brown stool the following day.  Patient was d/c on 8/18 with hgb 10.6 and xarelto was continued.  No transfusion was required throughout the admission.      Patient presented to the ED last night with complaints of GI bleeding.  It is was a deep red blood with clot.  No associated abdominal pain.  One episode prior to arrival a couple hours after taking Xarelto that evening.  On presentation, VSS.  Laboratory notable for worsening anemia with hemoglobin 9.4, INR 2.6.  CT abdomen/pelvis with/without IV contrast demonstrated a focus of blushing within the cecum on delayed phase imaging consistent with active GI hemorrhage.  Patient was admitted and GI was consulted.  Today, hemoglobin is 8.3.  He had about 5-7 similar episodes of bleeding overnight with the last being around 5:00am.  He denies abdominal pain but has an ache like he needs to go to the restroom.  No nausea or vomiting.      Previous records reviewed.     PCP:  [facility]    Review of patient's allergies indicates:   Allergen Reactions    Colestipol     Meloxicam     Rosuvastatin     Simvastatin     Statins-hmg-coa reductase inhibitors     Tramadol         Current Facility-Administered Medications   Medication Dose Route Frequency Provider Last Rate Last Admin    0.9%  NaCl  infusion (for blood administration)   Intravenous Q24H PRN Aurora Fernández FNP   Stopped at 08/22/23 0635    ferric gluconate (FERRLECIT) 125 mg in sodium chloride 0.9% 100 mL IVPB  125 mg Intravenous 1 time in Clinic/HOD Giovanny Salgado  mL/hr at 08/22/23 0754 125 mg at 08/22/23 0754    fluticasone furoate-vilanteroL 100-25 mcg/dose diskus inhaler 1 puff  1 puff Inhalation Daily Giovanny Salgado MD        lactated ringers infusion   Intravenous Continuous Aurora Fernández  mL/hr at 08/22/23 0628 New Bag at 08/22/23 0628    levalbuterol nebulizer solution 0.63 mg  0.63 mg Nebulization Q8H Giovanny Salgado MD        metoprolol succinate 24 hr tablet 75 mg  75 mg Oral Daily Giovanny Salgado MD        pantoprazole injection 40 mg  40 mg Intravenous Daily Giovanny Salgado MD         Current Outpatient Medications   Medication Sig Dispense Refill    furosemide (LASIX) 40 MG tablet Take 1 tablet (40 mg total) by mouth once daily. 30 tablet 2    metoprolol succinate (TOPROL-XL) 25 MG 24 hr tablet Take 3 tablets (75 mg total) by mouth once daily. 90 tablet 2    rivaroxaban (XARELTO) 20 mg Tab Take 1 tablet (20 mg total) by mouth daily with dinner or evening meal. 30 tablet 0    valsartan (DIOVAN) 40 MG tablet Take 1 tablet (40 mg total) by mouth once daily. 90 tablet 3    aspirin 81 MG Chew Take 1 tablet (81 mg total) by mouth once daily. 30 tablet 0    fluticasone furoate-vilanteroL (BREO) 100-25 mcg/dose diskus inhaler Inhale 1 puff into the lungs once daily. Controller 30 each 0    guaiFENesin 100 mg/5 ml (ROBITUSSIN) 100 mg/5 mL syrup Take 10 mLs (200 mg total) by mouth 4 (four) times daily as needed for Congestion or Cough. 100 mL 0    hawthorn 500 mg Cap Take 1 capsule by mouth.      levalbuterol (XOPENEX) 0.63 mg/3 mL nebulizer solution Take 3 mLs (0.63 mg total) by nebulization every 8 (eight) hours. Rescue 270 mL 0    pantoprazole (PROTONIX) 40 MG tablet Take 1 tablet (40 mg total) by mouth 2 (two) times daily  before meals. 60 tablet 0    sucralfate (CARAFATE) 1 gram tablet Take 1 tablet (1 g total) by mouth 4 (four) times daily before meals and nightly. 120 tablet 0     (Not in a hospital admission)      Past Medical History:  Past Medical History:   Diagnosis Date    A-fib     CHF (congestive heart failure)     Chronic pain     Hypercholesterolemia     Stroke       Past Surgical History:  Past Surgical History:   Procedure Laterality Date    COLONOSCOPY N/A 8/16/2023    Procedure: COLON;  Surgeon: Jennifer Santillan III, MD;  Location: [facility] ENDOSCOPY;  Service: Gastroenterology;  Laterality: N/A;    COLONOSCOPY N/A 8/16/2023    Procedure: COLONOSCOPY, WITH HEMORRHAGE CONTROL;  Surgeon: Jennifer Santillan III, MD;  Location: [facility] ENDOSCOPY;  Service: Gastroenterology;  Laterality: N/A;    COLONOSCOPY, WITH POLYPECTOMY USING SNARE N/A 8/16/2023    Procedure: COLONOSCOPY, WITH POLYPECTOMY USING SNARE;  Surgeon: Jennifer Santillan III, MD;  Location: [facility] ENDOSCOPY;  Service: Gastroenterology;  Laterality: N/A;    FRACTURE SURGERY      TIBIA FRACTURE SURGERY Right       Family History:  Family History   Problem Relation Age of Onset    Hypertension Mother     Heart disease Mother     Hyperlipidemia Mother     Cancer Mother     COPD Mother     Dementia Father      Social History:  Social History     Tobacco Use    Smoking status: Never    Smokeless tobacco: Never   Substance Use Topics    Alcohol use: Yes     Comment: Has cut down from 6 to a couple of beer now       Review of Systems:     Review of Systems   Constitutional:  Negative for appetite change, chills, fatigue, fever and unexpected weight change.   HENT:  Negative for trouble swallowing.    Respiratory:  Negative for cough, chest tightness, shortness of breath and wheezing.    Cardiovascular:  Negative for chest pain, palpitations and leg swelling.   Gastrointestinal:  Positive for blood in stool. Negative for abdominal distention, abdominal pain,  constipation, diarrhea, nausea and vomiting.   Musculoskeletal:  Negative for arthralgias and back pain.   Skin:  Negative for color change and pallor.   Neurological:  Negative for dizziness, syncope, weakness, light-headedness and headaches.   Psychiatric/Behavioral:  Negative for agitation and confusion. The patient is not nervous/anxious.        Objective:     VITAL SIGNS: 24 HR MIN & MAX LAST    Temp  Min: 97.7 °F (36.5 °C)  Max: 98.1 °F (36.7 °C)  98.1 °F (36.7 °C)        BP  Min: 99/52  Max: 138/68  112/64     Pulse  Min: 64  Max: 77  65     Resp  Min: 16  Max: 23  16    SpO2  Min: 95 %  Max: 99 %  95 %        Intake/Output Summary (Last 24 hours) at 8/22/2023 0843  Last data filed at 8/22/2023 0624  Gross per 24 hour   Intake 644 ml   Output 250 ml   Net 394 ml       Physical Exam  Constitutional:       General: He is not in acute distress.     Appearance: He is not ill-appearing.   HENT:      Head: Normocephalic and atraumatic.   Eyes:      General: No scleral icterus.     Extraocular Movements: Extraocular movements intact.   Cardiovascular:      Rate and Rhythm: Normal rate.   Pulmonary:      Effort: Pulmonary effort is normal. No respiratory distress.   Abdominal:      General: Bowel sounds are normal. There is no distension.      Palpations: Abdomen is soft. There is no mass.      Tenderness: There is no abdominal tenderness. There is no guarding or rebound.   Musculoskeletal:      Right lower leg: No edema.      Left lower leg: No edema.   Skin:     General: Skin is warm and dry.      Coloration: Skin is not jaundiced.   Neurological:      Mental Status: He is alert and oriented to person, place, and time.   Psychiatric:         Mood and Affect: Mood normal.         Behavior: Behavior normal.           Recent Results (from the past 48 hour(s))   Comprehensive metabolic panel    Collection Time: 08/21/23 11:36 PM   Result Value Ref Range    Sodium Level 142 136 - 145 mmol/L    Potassium Level 4.1 3.5 -  5.1 mmol/L    Chloride 103 98 - 107 mmol/L    Carbon Dioxide 30 23 - 31 mmol/L    Glucose Level 116 (H) 82 - 115 mg/dL    Blood Urea Nitrogen 28.9 (H) 8.4 - 25.7 mg/dL    Creatinine 1.39 (H) 0.73 - 1.18 mg/dL    Calcium Level Total 9.1 8.8 - 10.0 mg/dL    Protein Total 6.8 5.8 - 7.6 gm/dL    Albumin Level 3.2 (L) 3.4 - 4.8 g/dL    Globulin 3.6 (H) 2.4 - 3.5 gm/dL    Albumin/Globulin Ratio 0.9 (L) 1.1 - 2.0 ratio    Bilirubin Total 0.3 <=1.5 mg/dL    Alkaline Phosphatase 57 40 - 150 unit/L    Alanine Aminotransferase 21 0 - 55 unit/L    Aspartate Aminotransferase 17 5 - 34 unit/L    eGFR 49 mls/min/1.73/m2   Protime-INR    Collection Time: 08/21/23 11:36 PM   Result Value Ref Range    PT 27.6 (H) 12.5 - 14.5 seconds    INR 2.6 (H) <=1.3   APTT    Collection Time: 08/21/23 11:36 PM   Result Value Ref Range    PTT 44.5 (H) 23.2 - 33.7 seconds   Type & Screen    Collection Time: 08/21/23 11:36 PM   Result Value Ref Range    Group & Rh A POS     Indirect Torrey GEL NEG     Specimen Outdate 08/24/2023 23:59    CBC with Differential    Collection Time: 08/21/23 11:36 PM   Result Value Ref Range    WBC 10.41 4.50 - 11.50 x10(3)/mcL    RBC 3.42 (L) 4.70 - 6.10 x10(6)/mcL    Hgb 9.4 (L) 14.0 - 18.0 g/dL    Hct 28.5 (L) 42.0 - 52.0 %    MCV 83.3 80.0 - 94.0 fL    MCH 27.5 27.0 - 31.0 pg    MCHC 33.0 33.0 - 36.0 g/dL    RDW 15.7 11.5 - 17.0 %    Platelet 403 (H) 130 - 400 x10(3)/mcL    MPV 10.0 7.4 - 10.4 fL    Neut % 66.7 %    Lymph % 20.7 %    Mono % 7.7 %    Eos % 2.9 %    Basophil % 0.5 %    Lymph # 2.15 0.6 - 4.6 x10(3)/mcL    Neut # 6.95 2.1 - 9.2 x10(3)/mcL    Mono # 0.80 0.1 - 1.3 x10(3)/mcL    Eos # 0.30 0 - 0.9 x10(3)/mcL    Baso # 0.05 <=0.2 x10(3)/mcL    IG# 0.16 (H) 0 - 0.04 x10(3)/mcL    IG% 1.5 %    NRBC% 0.0 %   Prepare Fresh Frozen Plasma 2 Units    Collection Time: 08/21/23 11:36 PM   Result Value Ref Range    UNIT NUMBER [id number]     UNIT ABO/RH A POS     DISPENSE STATUS Issued     Unit Expiration  [id number]     Product Code N4199M71     Unit Blood Type Code 6200     CROSSMATCH INTERPRETATION Not required     UNIT NUMBER [id number]     UNIT ABO/RH A POS     DISPENSE STATUS Issued     Unit Expiration [id number]     Product Code P5729E19     Unit Blood Type Code 6200     CROSSMATCH INTERPRETATION Not required    ABORH RETYPE    Collection Time: 08/22/23  1:33 AM   Result Value Ref Range    ABORH Retype A POS    Hemoglobin and Hematocrit    Collection Time: 08/22/23  3:09 AM   Result Value Ref Range    Hgb 8.3 (L) 14.0 - 18.0 g/dL    Hct 25.4 (L) 42.0 - 52.0 %   Folate    Collection Time: 08/22/23  4:21 AM   Result Value Ref Range    Folate Level 14.6 7.0 - 31.4 ng/mL   Iron and TIBC    Collection Time: 08/22/23  4:22 AM   Result Value Ref Range    Iron Binding Capacity Unsaturated 225 69 - 240 ug/dL    Iron Level 48 (L) 65 - 175 ug/dL    Transferrin 240 mg/dL    Iron Binding Capacity Total 273 250 - 450 ug/dL    Iron Saturation 18 (L) 20 - 50 %   Ferritin    Collection Time: 08/22/23  4:22 AM   Result Value Ref Range    Ferritin Level 260.79 21.81 - 274.66 ng/mL   Vitamin B12    Collection Time: 08/22/23  4:22 AM   Result Value Ref Range    Vitamin B12 Level 288 213 - 816 pg/mL       CT Abdomen Pelvis W Wo Contrast    Result Date: 8/22/2023  EXAMINATION: CT ABDOMEN PELVIS W WO CONTRAST CLINICAL HISTORY: GI bleed; TECHNIQUE: Low dose axial images, sagittal and coronal reformations were obtained from the lung bases to the pubic symphysis before and following the IV administration of 100 mL of Omnipaque 350.  .  Oral contrast not given.  DLP 1242.  Automated exposure control used. COMPARISON: None FINDINGS: Delayed phase images demonstrate a focus of blushing within the cecum consistent with active hemorrhage.  No inflammatory changes or bowel dilation evident.  No lymphadenopathy. Liver normal enhancement with no focal lesion. Gallbladder and biliary ductal system normal. Pancreas, stomach, adrenal glands  normal.  Numerous splenic calcifications. Kidneys normal enhancement with no hydronephrosis. Aorta tapers normally.  Severe atherosclerotic calcification. Bladder and rectum normal. Bones intact. Mild left pleural fluid, moderately prominent peripheral distribution chronic appearing interstitial changes with small areas of fibrosis.     Small globular area intraluminal contrast within the cecum consistent with active hemorrhage. Agree with jayleen. Electronically signed by: Sandy Thomson MD Date:    08/22/2023 Time:    08:12    Echo    Result Date: 8/12/2023    Left Ventricle: The left ventricle is normal in size. Mildly increased wall thickness. Moderate global hypokinesis present. There is moderately reduced systolic function. Ejection fraction by visual approximation is 35%.   Left Atrium: Left atrium is severely dilated.   Right Ventricle: Mild right ventricular enlargement. Systolic function is mildly reduced.   Right Atrium: Right atrium is moderately dilated.   Aortic Valve: There is mild aortic valve sclerosis. There is mild aortic regurgitation.   Mitral Valve: There is no stenosis. There is mild regurgitation.   Tricuspid Valve: There is mild regurgitation.   Pulmonic Valve: There is no significant stenosis. There is mild regurgitation.   Pericardium: Left pleural effusion.     CT Chest W Wo Contrast    Result Date: 8/12/2023  EXAMINATION CT CHEST W WO CONTRAST CLINICAL HISTORY LLL opacity; heavy tobacco use + weight loss; TECHNIQUE Pre- and post-contrast helical-acquisition CT images were obtained and multiplanar reformats accomplished by a CT technologist at a separate workstation, pushed to PACS for physician review. CONTRAST IV: ISOVUE-370, 100 mL COMPARISON None available at the time of initial evaluation FINDINGS Images were reviewed in lung, soft tissue, and bone windows. Exam quality: adequate for evaluation Lines/tubes: none visualized Global dilatation of the heart chambers is noted.  There  is a small volume pericardial effusion.  Scattered coronary and aortic calcification present.  No aneurysmal dilatation of the aorta is identified.  There is no focal vessel contour irregularity.  No intraluminal filling defect of the central pulmonary arteries. Central airway patency is widely maintained.  Extensive bilateral chronic lung parenchymal changes are present, with suspected dependent left basilar atelectasis.  No organized lobar consolidation is identified.  There is no aggressive appearing mass-like focal lung lesion.  Small volume layering bilateral pleural effusions are noted, with no associated pleural thickening or loculated fluid component.  There is no pneumothorax. No pathologic lymph node enlargement or necrotic adenopathy is evident.  Scattered nonspecific calcified mediastinal lymph nodes are incidentally noted. The visualized thyroid gland is unremarkable.  Included upper abdominal structures are without evidence of acute or suspicious focal abnormality.  Multiple punctate calcifications are scattered throughout the spleen, suggestive of remote granulomatous process. There are degenerative alterations throughout the bony thorax, with no evidence of acute or destructive skeletal process.  Visualized subcutaneous tissues and regional musculature are unremarkable. IMPRESSION 1. Widespread chronic lung parenchymal changes without evidence of acute pulmonary process. 2. Small volume pericardial and bilateral pleural effusions. 3. Chronic sequela of prior granulomatous disease. 4. Additional chronic secondary details discussed above. ========== No significant discrepancy identified in relation to the teleradiology preliminary report. RADIATION DOSE Automated tube current modulation, weight-based exposure dosing, and/or iterative reconstruction technique utilized to reach lowest reasonably achievable exposure rate. DLP:  384 mGy*cm Electronically signed by: Bruce oRque Date:    08/12/2023  Time:    13:31    X-Ray Chest AP Portable    Result Date: 8/11/2023  EXAMINATION: XR CHEST AP PORTABLE CLINICAL HISTORY: Shortness of breath TECHNIQUE: Single view of the chest COMPARISON: 08/10/2023 FINDINGS: No focal opacification.  Left-sided pleural effusion similar to prior.  Cardiomegaly is unchanged.  No pneumothorax.     No adverse interval change.  Left-sided pleural effusion remains. Electronically signed by: Linwood Loera Date:    08/11/2023 Time:    15:17    X-Ray Chest 1 View    Result Date: 8/10/2023  EXAMINATION: XR CHEST 1 VIEW CLINICAL HISTORY: shortness of breath; TECHNIQUE: Frontal view(s) of the chest. COMPARISON: Radiography 08/04/2023 FINDINGS: Similar cardiomegaly and pulmonary vascular congestion.  More confluent opacity in the medial left lung base.  No significant pleural fluid or pneumothorax.     Similar cardiomegaly and pulmonary vascular congestion. There continues to be suspected opacity in the left lung base, atelectasis, infiltrate or mild pulmonary edema. Electronically signed by: Ricardo Abbott Date:    08/10/2023 Time:    14:30    Echo    Result Date: 8/5/2023    Left Ventricle: The left ventricle is mildly dilated. Normal wall thickness. Normal wall motion. There is mildly reduced systolic function with a visually estimated ejection fraction of 45 - 50%. Unable to assess diastolic function due to arrhythmia. Elevated left ventricular filling pressure.   Left Atrium: Left atrium is severely dilated.   Right Ventricle: Mild right ventricular enlargement. Systolic function is normal.   Right Atrium: Right atrium is severely dilated.   Aortic Valve: The aortic valve is a trileaflet valve. Mildly restricted motion.   Mitral Valve: Moderately thickened leaflets. There is mild regurgitation.   Pulmonic Valve: There is mild regurgitation.   Pericardium: Small circumferential pericardial effusion present. No indication of cardiac tamponade.     X-Ray Chest AP Portable    Result Date:  8/4/2023  EXAMINATION: XR CHEST AP PORTABLE CLINICAL HISTORY: Chest Pain; TECHNIQUE: Single frontal view of the chest was performed. COMPARISON: August 3, 2023 FINDINGS: Examination reveals cardiomediastinal silhouette and pleuroparenchymal changes to be essentially unchanged as compared with the previous exam     No significant change Electronically signed by: Mundo Watts Date:    08/04/2023 Time:    09:46    X-ray Chest AP Portable    Result Date: 8/3/2023  EXAMINATION: XR CHEST AP PORTABLE CLINICAL HISTORY: Shortness of breath;, . COMPARISON: None available FINDINGS: An AP view or more reveals the heart to be enlarged.  The trachea is midline.  Mild hazy interstitial opacities evident the lungs bilaterally diffusely throughout.  The left hemidiaphragm is obscured.  Degenerative changes are evident at the thoracic spine.  Atherosclerosis is seen within the aorta.     1. Cardiomegaly 2. Obscured left hemidiaphragm suspicious for left basilar infiltrate and/or pleural reaction 3. Hazy interstitial opacities throughout the lungs bilaterally which may represent interstitial pulmonary edema versus chronic interstitial changes.  Clinical correlation is indicated 4. Atherosclerosis Electronically signed by: Ceasar Pang Date:    08/03/2023 Time:    14:13      Imaging personally reviewed by myself and SP.    Assessment / Plan:     89 yo CM known to Dr. Benjamin from recent admission.  Patient with a pmhx of pandiverticulosis, HTN, HLD, tobacco use, HFrEF 35%, recent dx of afib early this month for which Xarelto was started, then an admission 8/11-8/18 with GI bleeding secondary to right colon AVMsx2.  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     GI bleeding: possibly from AVM in cecum vs ulcer at recent APC site in setting of AC vs diverticular hemorrhage less likely    CT: +blush in cecum   Hgb 10.6 on 8/18 --  9.4 on 8/21-- 8.3 today    -clear liquids today and prep for colonoscopy tomorrow.  -repeat h/h this afternoon. Transfuse to hgb 7-8  -Monitor stools for bleeding  -xarelto on hold.       Thank you for allowing us to participate in this patient's care.

## 2023-08-22 NOTE — NURSING
Nurses Note -- 4 Eyes      8/22/2023   4:17 PM      Skin assessed during: Admit      [x] No Altered Skin Integrity Present    [x]Prevention Measures Documented      [] Yes- Altered Skin Integrity Present or Discovered   [] LDA Added if Not in Epic (Describe Wound)   [] New Altered Skin Integrity was Present on Admit and Documented in LDA   [] Wound Image Taken    Wound Care Consulted? No    Attending Nurse:  Isis Wu RN/Staff Member:  Paula Garcia RN

## 2023-08-23 ENCOUNTER — ANESTHESIA (OUTPATIENT)
Dept: ENDOSCOPY | Facility: HOSPITAL | Age: 88
DRG: 378 | End: 2023-08-23
Payer: OTHER GOVERNMENT

## 2023-08-23 ENCOUNTER — ANESTHESIA EVENT (OUTPATIENT)
Dept: ENDOSCOPY | Facility: HOSPITAL | Age: 88
DRG: 378 | End: 2023-08-23
Payer: OTHER GOVERNMENT

## 2023-08-23 LAB
ANION GAP SERPL CALC-SCNC: 10 MEQ/L
BASOPHILS # BLD AUTO: 0.06 X10(3)/MCL
BASOPHILS NFR BLD AUTO: 0.5 %
BUN SERPL-MCNC: 13 MG/DL (ref 8.4–25.7)
CALCIUM SERPL-MCNC: 7.9 MG/DL (ref 8.8–10)
CHLORIDE SERPL-SCNC: 108 MMOL/L (ref 98–107)
CO2 SERPL-SCNC: 23 MMOL/L (ref 23–31)
CREAT SERPL-MCNC: 0.99 MG/DL (ref 0.73–1.18)
CREAT/UREA NIT SERPL: 13
EOSINOPHIL # BLD AUTO: 0.24 X10(3)/MCL (ref 0–0.9)
EOSINOPHIL NFR BLD AUTO: 2.1 %
ERYTHROCYTE [DISTWIDTH] IN BLOOD BY AUTOMATED COUNT: 15.6 % (ref 11.5–17)
GFR SERPLBLD CREATININE-BSD FMLA CKD-EPI: >60 MLS/MIN/1.73/M2
GLUCOSE SERPL-MCNC: 123 MG/DL (ref 82–115)
HCT VFR BLD AUTO: 20.7 % (ref 42–52)
HGB BLD-MCNC: 7.2 G/DL (ref 14–18)
IMM GRANULOCYTES # BLD AUTO: 0.17 X10(3)/MCL (ref 0–0.04)
IMM GRANULOCYTES NFR BLD AUTO: 1.5 %
LYMPHOCYTES # BLD AUTO: 2.33 X10(3)/MCL (ref 0.6–4.6)
LYMPHOCYTES NFR BLD AUTO: 20.8 %
MCH RBC QN AUTO: 28.7 PG (ref 27–31)
MCHC RBC AUTO-ENTMCNC: 34.8 G/DL (ref 33–36)
MCV RBC AUTO: 82.5 FL (ref 80–94)
MONOCYTES # BLD AUTO: 0.91 X10(3)/MCL (ref 0.1–1.3)
MONOCYTES NFR BLD AUTO: 8.1 %
NEUTROPHILS # BLD AUTO: 7.51 X10(3)/MCL (ref 2.1–9.2)
NEUTROPHILS NFR BLD AUTO: 67 %
NRBC BLD AUTO-RTO: 0 %
PLATELET # BLD AUTO: 294 X10(3)/MCL (ref 130–400)
PMV BLD AUTO: 9.8 FL (ref 7.4–10.4)
POTASSIUM SERPL-SCNC: 4.3 MMOL/L (ref 3.5–5.1)
RBC # BLD AUTO: 2.51 X10(6)/MCL (ref 4.7–6.1)
SODIUM SERPL-SCNC: 141 MMOL/L (ref 136–145)
WBC # SPEC AUTO: 11.22 X10(3)/MCL (ref 4.5–11.5)

## 2023-08-23 PROCEDURE — P9016 RBC LEUKOCYTES REDUCED: HCPCS | Performed by: STUDENT IN AN ORGANIZED HEALTH CARE EDUCATION/TRAINING PROGRAM

## 2023-08-23 PROCEDURE — 21400001 HC TELEMETRY ROOM

## 2023-08-23 PROCEDURE — 94640 AIRWAY INHALATION TREATMENT: CPT

## 2023-08-23 PROCEDURE — 63600175 PHARM REV CODE 636 W HCPCS: Performed by: ANESTHESIOLOGY

## 2023-08-23 PROCEDURE — 94761 N-INVAS EAR/PLS OXIMETRY MLT: CPT

## 2023-08-23 PROCEDURE — 80048 BASIC METABOLIC PNL TOTAL CA: CPT | Performed by: HOSPITALIST

## 2023-08-23 PROCEDURE — 45382 COLONOSCOPY W/CONTROL BLEED: CPT | Performed by: STUDENT IN AN ORGANIZED HEALTH CARE EDUCATION/TRAINING PROGRAM

## 2023-08-23 PROCEDURE — 37000008 HC ANESTHESIA 1ST 15 MINUTES: Performed by: STUDENT IN AN ORGANIZED HEALTH CARE EDUCATION/TRAINING PROGRAM

## 2023-08-23 PROCEDURE — D9220A PRA ANESTHESIA: Mod: CRNA,,,

## 2023-08-23 PROCEDURE — 25000242 PHARM REV CODE 250 ALT 637 W/ HCPCS: Performed by: STUDENT IN AN ORGANIZED HEALTH CARE EDUCATION/TRAINING PROGRAM

## 2023-08-23 PROCEDURE — 37000009 HC ANESTHESIA EA ADD 15 MINS: Performed by: STUDENT IN AN ORGANIZED HEALTH CARE EDUCATION/TRAINING PROGRAM

## 2023-08-23 PROCEDURE — 99900035 HC TECH TIME PER 15 MIN (STAT)

## 2023-08-23 PROCEDURE — 25000003 PHARM REV CODE 250

## 2023-08-23 PROCEDURE — D9220A PRA ANESTHESIA: ICD-10-PCS | Mod: CRNA,,,

## 2023-08-23 PROCEDURE — C1889 IMPLANT/INSERT DEVICE, NOC: HCPCS | Performed by: STUDENT IN AN ORGANIZED HEALTH CARE EDUCATION/TRAINING PROGRAM

## 2023-08-23 PROCEDURE — 27201423 OPTIME MED/SURG SUP & DEVICES STERILE SUPPLY: Performed by: STUDENT IN AN ORGANIZED HEALTH CARE EDUCATION/TRAINING PROGRAM

## 2023-08-23 PROCEDURE — D9220A PRA ANESTHESIA: ICD-10-PCS | Mod: ANES,,, | Performed by: ANESTHESIOLOGY

## 2023-08-23 PROCEDURE — 85025 COMPLETE CBC W/AUTO DIFF WBC: CPT | Performed by: HOSPITALIST

## 2023-08-23 PROCEDURE — D9220A PRA ANESTHESIA: Mod: ANES,,, | Performed by: ANESTHESIOLOGY

## 2023-08-23 PROCEDURE — 63600175 PHARM REV CODE 636 W HCPCS

## 2023-08-23 RX ORDER — ONDANSETRON 2 MG/ML
4 INJECTION INTRAMUSCULAR; INTRAVENOUS DAILY PRN
Status: CANCELLED | OUTPATIENT
Start: 2023-08-23

## 2023-08-23 RX ORDER — ONDANSETRON 2 MG/ML
INJECTION INTRAMUSCULAR; INTRAVENOUS
Status: DISPENSED
Start: 2023-08-23 | End: 2023-08-24

## 2023-08-23 RX ORDER — PROCHLORPERAZINE EDISYLATE 5 MG/ML
5 INJECTION INTRAMUSCULAR; INTRAVENOUS EVERY 30 MIN PRN
Status: CANCELLED | OUTPATIENT
Start: 2023-08-23

## 2023-08-23 RX ORDER — MEPERIDINE HYDROCHLORIDE 25 MG/ML
12.5 INJECTION INTRAMUSCULAR; INTRAVENOUS; SUBCUTANEOUS EVERY 10 MIN PRN
Status: CANCELLED | OUTPATIENT
Start: 2023-08-23 | End: 2023-08-24

## 2023-08-23 RX ORDER — LIDOCAINE HYDROCHLORIDE 10 MG/ML
1 INJECTION, SOLUTION EPIDURAL; INFILTRATION; INTRACAUDAL; PERINEURAL ONCE
Status: DISCONTINUED | OUTPATIENT
Start: 2023-08-23 | End: 2023-08-26 | Stop reason: HOSPADM

## 2023-08-23 RX ORDER — FUROSEMIDE 10 MG/ML
20 INJECTION INTRAMUSCULAR; INTRAVENOUS ONCE
Status: DISCONTINUED | OUTPATIENT
Start: 2023-08-23 | End: 2023-08-23

## 2023-08-23 RX ORDER — LIDOCAINE HYDROCHLORIDE 10 MG/ML
INJECTION, SOLUTION EPIDURAL; INFILTRATION; INTRACAUDAL; PERINEURAL
Status: DISPENSED
Start: 2023-08-23 | End: 2023-08-23

## 2023-08-23 RX ORDER — FUROSEMIDE 10 MG/ML
10 INJECTION INTRAMUSCULAR; INTRAVENOUS ONCE
Status: COMPLETED | OUTPATIENT
Start: 2023-08-23 | End: 2023-08-23

## 2023-08-23 RX ORDER — HYDROCODONE BITARTRATE AND ACETAMINOPHEN 500; 5 MG/1; MG/1
TABLET ORAL
Status: DISCONTINUED | OUTPATIENT
Start: 2023-08-23 | End: 2023-08-26 | Stop reason: HOSPADM

## 2023-08-23 RX ORDER — PROPOFOL 10 MG/ML
VIAL (ML) INTRAVENOUS
Status: COMPLETED
Start: 2023-08-23 | End: 2023-08-23

## 2023-08-23 RX ORDER — SODIUM CHLORIDE, SODIUM GLUCONATE, SODIUM ACETATE, POTASSIUM CHLORIDE AND MAGNESIUM CHLORIDE 30; 37; 368; 526; 502 MG/100ML; MG/100ML; MG/100ML; MG/100ML; MG/100ML
INJECTION, SOLUTION INTRAVENOUS CONTINUOUS
Status: DISCONTINUED | OUTPATIENT
Start: 2023-08-23 | End: 2023-08-23

## 2023-08-23 RX ORDER — EPINEPHRINE 0.1 MG/ML
INJECTION INTRAVENOUS
Status: DISPENSED
Start: 2023-08-23 | End: 2023-08-23

## 2023-08-23 RX ORDER — ONDANSETRON 2 MG/ML
INJECTION INTRAMUSCULAR; INTRAVENOUS
Status: DISCONTINUED | OUTPATIENT
Start: 2023-08-23 | End: 2023-08-23

## 2023-08-23 RX ORDER — LORAZEPAM 2 MG/ML
0.25 INJECTION INTRAMUSCULAR ONCE AS NEEDED
Status: CANCELLED | OUTPATIENT
Start: 2023-08-23 | End: 2035-01-18

## 2023-08-23 RX ORDER — PROPOFOL 10 MG/ML
VIAL (ML) INTRAVENOUS
Status: DISCONTINUED | OUTPATIENT
Start: 2023-08-23 | End: 2023-08-23

## 2023-08-23 RX ORDER — LEVALBUTEROL INHALATION SOLUTION 0.63 MG/3ML
0.63 SOLUTION RESPIRATORY (INHALATION) EVERY 8 HOURS
Status: DISCONTINUED | OUTPATIENT
Start: 2023-08-23 | End: 2023-08-26 | Stop reason: HOSPADM

## 2023-08-23 RX ORDER — IPRATROPIUM BROMIDE AND ALBUTEROL SULFATE 2.5; .5 MG/3ML; MG/3ML
3 SOLUTION RESPIRATORY (INHALATION)
Status: CANCELLED | OUTPATIENT
Start: 2023-08-23

## 2023-08-23 RX ORDER — PHENYLEPHRINE HCL IN 0.9% NACL 1 MG/10 ML
SYRINGE (ML) INTRAVENOUS
Status: DISCONTINUED | OUTPATIENT
Start: 2023-08-23 | End: 2023-08-23

## 2023-08-23 RX ORDER — ONDANSETRON 4 MG/1
8 TABLET, ORALLY DISINTEGRATING ORAL EVERY 6 HOURS PRN
Status: DISCONTINUED | OUTPATIENT
Start: 2023-08-23 | End: 2023-08-26 | Stop reason: HOSPADM

## 2023-08-23 RX ADMIN — FUROSEMIDE 10 MG: 10 INJECTION, SOLUTION INTRAMUSCULAR; INTRAVENOUS at 09:08

## 2023-08-23 RX ADMIN — LEVALBUTEROL HYDROCHLORIDE 0.63 MG: 0.63 SOLUTION RESPIRATORY (INHALATION) at 04:08

## 2023-08-23 RX ADMIN — Medication 100 MCG: at 11:08

## 2023-08-23 RX ADMIN — ONDANSETRON 4 MG: 2 INJECTION INTRAMUSCULAR; INTRAVENOUS at 11:08

## 2023-08-23 RX ADMIN — PROPOFOL 30 MG: 10 INJECTION, EMULSION INTRAVENOUS at 11:08

## 2023-08-23 RX ADMIN — Medication 200 MCG: at 11:08

## 2023-08-23 RX ADMIN — SODIUM CHLORIDE, SODIUM GLUCONATE, SODIUM ACETATE, POTASSIUM CHLORIDE AND MAGNESIUM CHLORIDE: 526; 502; 368; 37; 30 INJECTION, SOLUTION INTRAVENOUS at 11:08

## 2023-08-23 RX ADMIN — PROPOFOL 50 MCG/KG/MIN: 10 INJECTION, EMULSION INTRAVENOUS at 11:08

## 2023-08-23 RX ADMIN — PROPOFOL 50 MG: 10 INJECTION, EMULSION INTRAVENOUS at 11:08

## 2023-08-23 NOTE — PROVATION PATIENT INSTRUCTIONS
Discharge Summary/Instructions after an Endoscopic Procedure  Patient Name: Lizandro Martinez  Patient MRN: [id number]  Patient YOB: 1935 Wednesday, August 23, 2023  Ton Minor MD  Dear patient,  As a result of recent federal legislation (The Federal Cures Act), you may   receive lab or pathology results from your procedure in your [facility]   account before your physician is able to contact you. Your physician or   their representative will relay the results to you with their   recommendations at their soonest availability.  Thank you,  RESTRICTIONS:  During your procedure today, you received medications for sedation.  These   medications may affect your judgment, balance and coordination.  Therefore,   for 24 hours, you have the following restrictions:   - DO NOT drive a car, operate machinery, make legal/financial decisions,   sign important papers or drink alcohol.    ACTIVITY:  Today: no heavy lifting, straining or running due to procedural   sedation/anesthesia.  The following day: return to full activity including work.  DIET:  Eat and drink normally unless instructed otherwise.     TREATMENT FOR COMMON SIDE EFFECTS:  - Mild abdominal pain, nausea, belching, bloating or excessive gas:  rest,   eat lightly and use a heating pad.  - Sore Throat: treat with throat lozenges and/or gargle with warm salt   water.  - Because air was used during the procedure, expelling large amounts of air   from your rectum or belching is normal.  - If a bowel prep was taken, you may not have a bowel movement for 1-3 days.    This is normal.  SYMPTOMS TO WATCH FOR AND REPORT TO YOUR PHYSICIAN:  1. Abdominal pain or bloating, other than gas cramps.  2. Chest pain.  3. Back pain.  4. Signs of infection such as: chills or fever occurring within 24 hours   after the procedure.  5. Rectal bleeding, which would show as bright red, maroon, or black stools.   (A tablespoon of blood from the rectum is not serious, especially  if   hemorrhoids are present.)  6. Vomiting.  7. Weakness or dizziness.  GO DIRECTLY TO THE NEAREST EMERGENCY ROOM IF YOU HAVE ANY OF THE FOLLOWING:      Difficulty breathing              Chills and/or fever over 101 F   Persistent vomiting and/or vomiting blood   Severe abdominal pain   Severe chest pain   Black, tarry stools   Bleeding- more than one tablespoon   Any other symptom or condition that you feel may need urgent attention  Your doctor recommends these additional instructions:  If any biopsies were taken, your doctors clinic will contact you in 1 to 2   weeks with any results.  - Return patient to hospital [facility] for observation.   - Clear liquid diet.   - Continue present medications.   - Resume Xarelto (rivaroxaban) at prior dose in 3 days.   - Repeat colonoscopy in 3 months because the examination was incomplete and   because the bowel preparation was poor.  For questions, problems or results please call your physician - Ton Minor MD at Work:  [phone].  [facility] [place], EMERGENCY ROOM PHONE NUMBER: [phone]  IF A COMPLICATION OR EMERGENCY SITUATION ARISES AND YOU ARE UNABLE TO REACH   YOUR PHYSICIAN - GO DIRECTLY TO THE EMERGENCY ROOM.  MD Ton Duran MD  8/23/2023 12:25:32 PM  This report has been verified and signed electronically.  Dear patient,  As a result of recent federal legislation (The Federal Cures Act), you may   receive lab or pathology results from your procedure in your [facility]   account before your physician is able to contact you. Your physician or   their representative will relay the results to you with their   recommendations at their soonest availability.  Thank you,  PROVATION

## 2023-08-23 NOTE — TRANSFER OF CARE
"Anesthesia Transfer of Care Note    Patient: Lizandro Martinez    Procedure(s) Performed: Procedure(s) (LRB):  COLON (N/A)  COLONOSCOPY, WITH HEMORRHAGE CONTROL    Patient location: GI    Anesthesia Type: MAC    Transport from OR: Transported from OR on room air with adequate spontaneous ventilation    Post pain: adequate analgesia    Post assessment: no apparent anesthetic complications    Post vital signs: stable    Level of consciousness: awake    Nausea/Vomiting: no nausea/vomiting    Complications: none    Transfer of care protocol was followed      Last vitals:   Visit Vitals  BP 95/70   Pulse 93   Temp 36.4 °C (97.5 °F)   Resp (!) 22   Ht 5' 10" (1.778 m)   Wt 53.5 kg (118 lb)   SpO2 97%   BMI 16.93 kg/m²     "

## 2023-08-23 NOTE — PROGRESS NOTES
[facility] Hospital Medicine Progress Note        Chief Complaint: Inpatient Follow-up    HPI:   Mr Martinez is a 88-year-old male with past medical history of chronic systolic heart failure, atrial fibrillation on Xarelto, CAD, hyperlipidemia, and previous stroke presents to the ER after having a bloody bowel movement after recently starting his Xarelto.  He was recently admitted from 08/11 to 8/18 after a heart failure exacerbation and before that was in the hospital from 08/04 days/6 for new onset atrial fibrillation.  During the 1st visit he was started on Xarelto.  Seems that he had some bleeding after discharge.  GI evaluated and since his hemoglobin stable they did not perform any initial endoscopy.  During his stay he continued to have dark bowel movements in hemoglobin was slightly lower.  Aspirin Xarelto was held.  A colonoscopy on 08/16 which showed medium-sized angiodysplastic lesion was stigmata of recent bleeding in the ascending colon and cecum.  These were treated with argon plasma anticoagulation.  He also had diffuse diverticulosis.  GI cleared him to resume his anticoagulation which she did on 8/20.  He states that next day he started seeing dark bowel movements again with some streaks of bright red blood and presented to the emergency room.  In the ER his vital signs are stable.  Does show iron deficiency on labs and his hemoglobin has dropped from 10 previously to 8.3 today.  GI has already been consulted and patient is currently NPO. Hospitalist group was asked to admit. CT A/P with Contrast showed small globular area of intraluminal contrast in cecum consistent with active hemorrhage. GI planned for Colonoscopy on 08/23 & patient was started on prep.    Interval Hx:   Mr Martinez stated he was doing well and had no new complaints. Underwent Colonoscopy today; will F/U report. Will continue following GI recs. Will consult PT for ambulation. Patient continued to  have bloody bowel movements.    Case was discussed with patient's nurse on the floor.    Objective/physical exam:  General: alert male lying comfortably in bed, in no acute distress  HENT: oral and oropharyngeal mucosa moist, pink, with no erythema or exudates, no ear pain or discharge  Neck: normal neck movement, no lymph nodes or swellings, no JVD or Carotid bruit  Respiratory: clear breathing sounds bilaterally, no crackles, rales, ronchi or wheezes  Cardiovascular: clear S1 and S2, no murmurs, rubs or gallops  Peripheral Vascular: no lesions, ulcers or erosions, normal peripheral pulses and no pedal edema  Gastrointestinal: soft, non-tender, non-distended abdomen, no guarding, rigidity or rebound tenderness, normal bowel sounds  Integumentary: normal skin color, no rashes or lesions  Neuro: AAO x 3; motor strength 5/5 in B/L UEs & LEs; sensation intact to gross and fine touch B/L; CN II-XII grossly intact      VITAL SIGNS: 24 HRS MIN & MAX LAST   Temp  Min: 97.2 °F (36.2 °C)  Max: 98.2 °F (36.8 °C) 98 °F (36.7 °C)   BP  Min: 95/70  Max: 133/74 115/60   Pulse  Min: 66  Max: 108  82   Resp  Min: 16  Max: 24 18   SpO2  Min: 94 %  Max: 98 % 96 %     I have reviewed the following labs:  Recent Labs   Lab 08/18/23  0407 08/21/23  2336 08/22/23  0309 08/22/23  0954 08/23/23  0505   WBC 14.94* 10.41  --   --  11.22   RBC 3.91* 3.42*  --   --  2.51*   HGB 10.6* 9.4* 8.3* 7.1* 7.2*   HCT 32.2* 28.5* 25.4* 22.2* 20.7*   MCV 82.4 83.3  --   --  82.5   MCH 27.1 27.5  --   --  28.7   MCHC 32.9* 33.0  --   --  34.8   RDW 15.2 15.7  --   --  15.6   * 403*  --   --  294   MPV 9.1 10.0  --   --  9.8     Recent Labs   Lab 08/17/23  0349 08/18/23  0407 08/21/23  2336 08/23/23  0505    137 142 141   K 4.1 4.1 4.1 4.3   CO2 31 31 30 23   BUN 20.8 25.8* 28.9* 13.0   CREATININE 1.10 1.19* 1.39* 0.99   CALCIUM 8.8 9.3 9.1 7.9*   MG  --  2.00  --   --    ALBUMIN 2.9* 2.9* 3.2*  --    ALKPHOS 54 57 57  --    ALT 31 28 21  --     AST 26 18 17  --    BILITOT 0.4 0.3 0.3  --      Assessment/Plan:  Hematochezia, Melena 2/2 GI Bleed  Prior Angiodysplastic Colonic Lesions with GI Bleed  A Fib on Xarelto  HFrEF  CAD  HLD  Prior CVA    Patient continues to be admitted for close monitoring  GI on-board; will continue following recommendations  Underwent Colonoscopy; will f/u on report  Continued on clear liquid diet  On IV /hr; will discontinue  Continued on Fluticasone-Vilanterol, Metoprolol Succinate 75 mg, Protonix 40 mg daily, Levalbuterol q6hrs  Holding Xarelto until patient is cleared by GI to resume  Will continue monitoring blood counts & stools for blood  Continue monitoring symptoms  Consult PT    Critical care note: Anemia requiring pRBC Transfusion  Critical care diagnosis:   Critical care interventions: Hands-on evaluation, review of labs/radiographs/records and discussion with patient and family if present  Critical care time spent: 45 minutes      VTE prophylaxis: SCDs    Patient condition:  Stable    Anticipated discharge and Disposition:     Pending    All diagnosis and differential diagnosis have been reviewed; assessment and plan has been documented; I have personally reviewed the labs and test results that are presently available; I have reviewed the patients medication list; I have reviewed the consulting providers response and recommendations. I have reviewed or attempted to review medical records based upon their availability    All of the patient's questions have been  addressed and answered. Patient's is agreeable to the above stated plan. I will continue to monitor closely and make adjustments to medical management as needed.  _____________________________________________________________________    Radiology:  I have personally reviewed the following imaging and agree with the radiologist.     CT Abdomen Pelvis W Wo Contrast  Narrative: EXAMINATION:  CT ABDOMEN PELVIS W WO CONTRAST    CLINICAL HISTORY:  GI  bleed;    TECHNIQUE:  Low dose axial images, sagittal and coronal reformations were obtained from the lung bases to the pubic symphysis before and following the IV administration of 100 mL of Omnipaque 350.  .  Oral contrast not given.  DLP 1242.  Automated exposure control used.    COMPARISON:  None    FINDINGS:  Delayed phase images demonstrate a focus of blushing within the cecum consistent with active hemorrhage.  No inflammatory changes or bowel dilation evident.  No lymphadenopathy.    Liver normal enhancement with no focal lesion.    Gallbladder and biliary ductal system normal.    Pancreas, stomach, adrenal glands normal.  Numerous splenic calcifications.    Kidneys normal enhancement with no hydronephrosis.    Aorta tapers normally.  Severe atherosclerotic calcification.    Bladder and rectum normal.    Bones intact.    Mild left pleural fluid, moderately prominent peripheral distribution chronic appearing interstitial changes with small areas of fibrosis.  Impression: Small globular area intraluminal contrast within the cecum consistent with active hemorrhage.    Agree with jayleen.    Electronically signed by: Sandy Thomson MD  Date:    08/22/2023  Time:    08:12      Naman Lomeli MD   08/23/2023

## 2023-08-23 NOTE — ANESTHESIA PREPROCEDURE EVALUATION
08/23/2023  Lizandro Martinez is a 88 y.o., male with history of AFib previously on Xarelto, CHF (EF 35%), CVA admitted August 21st with GI bleed/anemia requiring transfusion (2 PRBCs/2 FFP).  Patient tolerated IV sedation for colonoscopy during previous admission on August 16th (see below).  Patient presents today for colonoscopy with falling hematocrit despite transfusion and tachycardia.    Transthoracic echo August 12, 2023   EF 35%  Mild AI/MR, PI   Left pleural effusion        Pre-op Assessment    I have reviewed the Patient Summary Reports.    I have reviewed the NPO Status.   I have reviewed the Medications.     Review of Systems  Anesthesia Hx:   Denies Personal Hx of Anesthesia complications.   Social:  Non-Smoker    Hematology/Oncology:         -- Anemia:   Cardiovascular:   Dysrhythmias atrial fibrillation CHF  Functional Capacity 2 METS    Neurological:   CVA        Physical Exam  General: Well nourished, Cooperative, Alert and Oriented    Airway:  Mallampati: II   Mouth Opening: Normal  TM Distance: Normal  Tongue: Normal  Neck ROM: Normal ROM    Dental:  Periodontal disease    Chest/Lungs:  Clear to auscultation, Normal Respiratory Rate    Heart:  Rate: Normal, Tachycardia  Rhythm: Irregularly Irregular        Anesthesia Plan  Type of Anesthesia, risks & benefits discussed:    Anesthesia Type: Gen Natural Airway  Intra-op Monitoring Plan: Standard ASA Monitors  Induction:  IV  Informed Consent: Informed consent signed with the Patient and all parties understand the risks and agree with anesthesia plan.  All questions answered. Patient consented to blood products? Yes  ASA Score: 4  Day of Surgery Review of History & Physical: H&P Update referred to the surgeon/provider.  Anesthesia Plan Notes: Consider transfusing with Lasix--we will discuss with GI    Ready For Surgery From Anesthesia  Perspective.     .

## 2023-08-24 LAB
ALBUMIN SERPL-MCNC: 2.5 G/DL (ref 3.4–4.8)
ALBUMIN/GLOB SERPL: 1.1 RATIO (ref 1.1–2)
ALP SERPL-CCNC: 42 UNIT/L (ref 40–150)
ALT SERPL-CCNC: 10 UNIT/L (ref 0–55)
AST SERPL-CCNC: 13 UNIT/L (ref 5–34)
BASOPHILS # BLD AUTO: 0.03 X10(3)/MCL
BASOPHILS NFR BLD AUTO: 0.3 %
BILIRUB SERPL-MCNC: 0.8 MG/DL
BUN SERPL-MCNC: 8.5 MG/DL (ref 8.4–25.7)
CALCIUM SERPL-MCNC: 7.9 MG/DL (ref 8.8–10)
CHLORIDE SERPL-SCNC: 108 MMOL/L (ref 98–107)
CO2 SERPL-SCNC: 25 MMOL/L (ref 23–31)
CREAT SERPL-MCNC: 0.94 MG/DL (ref 0.73–1.18)
EOSINOPHIL # BLD AUTO: 0.22 X10(3)/MCL (ref 0–0.9)
EOSINOPHIL NFR BLD AUTO: 2.3 %
ERYTHROCYTE [DISTWIDTH] IN BLOOD BY AUTOMATED COUNT: 15.5 % (ref 11.5–17)
GFR SERPLBLD CREATININE-BSD FMLA CKD-EPI: >60 MLS/MIN/1.73/M2
GLOBULIN SER-MCNC: 2.2 GM/DL (ref 2.4–3.5)
GLUCOSE SERPL-MCNC: 112 MG/DL (ref 82–115)
HCT VFR BLD AUTO: 24 % (ref 42–52)
HGB BLD-MCNC: 8.3 G/DL (ref 14–18)
IMM GRANULOCYTES # BLD AUTO: 0.09 X10(3)/MCL (ref 0–0.04)
IMM GRANULOCYTES NFR BLD AUTO: 0.9 %
LYMPHOCYTES # BLD AUTO: 1.51 X10(3)/MCL (ref 0.6–4.6)
LYMPHOCYTES NFR BLD AUTO: 15.7 %
MCH RBC QN AUTO: 28.6 PG (ref 27–31)
MCHC RBC AUTO-ENTMCNC: 34.6 G/DL (ref 33–36)
MCV RBC AUTO: 82.8 FL (ref 80–94)
MONOCYTES # BLD AUTO: 0.79 X10(3)/MCL (ref 0.1–1.3)
MONOCYTES NFR BLD AUTO: 8.2 %
NEUTROPHILS # BLD AUTO: 6.95 X10(3)/MCL (ref 2.1–9.2)
NEUTROPHILS NFR BLD AUTO: 72.6 %
NRBC BLD AUTO-RTO: 0 %
PLATELET # BLD AUTO: 221 X10(3)/MCL (ref 130–400)
PMV BLD AUTO: 9.3 FL (ref 7.4–10.4)
POTASSIUM SERPL-SCNC: 3.9 MMOL/L (ref 3.5–5.1)
PROT SERPL-MCNC: 4.7 GM/DL (ref 5.8–7.6)
RBC # BLD AUTO: 2.9 X10(6)/MCL (ref 4.7–6.1)
SODIUM SERPL-SCNC: 139 MMOL/L (ref 136–145)
WBC # SPEC AUTO: 9.59 X10(3)/MCL (ref 4.5–11.5)

## 2023-08-24 PROCEDURE — 63600175 PHARM REV CODE 636 W HCPCS: Performed by: HOSPITALIST

## 2023-08-24 PROCEDURE — 94761 N-INVAS EAR/PLS OXIMETRY MLT: CPT

## 2023-08-24 PROCEDURE — 80053 COMPREHEN METABOLIC PANEL: CPT | Performed by: STUDENT IN AN ORGANIZED HEALTH CARE EDUCATION/TRAINING PROGRAM

## 2023-08-24 PROCEDURE — C9113 INJ PANTOPRAZOLE SODIUM, VIA: HCPCS | Performed by: HOSPITALIST

## 2023-08-24 PROCEDURE — 94640 AIRWAY INHALATION TREATMENT: CPT

## 2023-08-24 PROCEDURE — 25000242 PHARM REV CODE 250 ALT 637 W/ HCPCS: Performed by: STUDENT IN AN ORGANIZED HEALTH CARE EDUCATION/TRAINING PROGRAM

## 2023-08-24 PROCEDURE — 25000242 PHARM REV CODE 250 ALT 637 W/ HCPCS: Performed by: HOSPITALIST

## 2023-08-24 PROCEDURE — 85025 COMPLETE CBC W/AUTO DIFF WBC: CPT | Performed by: STUDENT IN AN ORGANIZED HEALTH CARE EDUCATION/TRAINING PROGRAM

## 2023-08-24 PROCEDURE — 25000003 PHARM REV CODE 250: Performed by: HOSPITALIST

## 2023-08-24 PROCEDURE — 21400001 HC TELEMETRY ROOM

## 2023-08-24 PROCEDURE — 99900035 HC TECH TIME PER 15 MIN (STAT)

## 2023-08-24 RX ORDER — FUROSEMIDE 40 MG/1
40 TABLET ORAL DAILY
Status: DISCONTINUED | OUTPATIENT
Start: 2023-08-25 | End: 2023-08-26 | Stop reason: HOSPADM

## 2023-08-24 RX ORDER — VALSARTAN 40 MG/1
40 TABLET ORAL DAILY
Status: DISCONTINUED | OUTPATIENT
Start: 2023-08-25 | End: 2023-08-26 | Stop reason: HOSPADM

## 2023-08-24 RX ADMIN — FLUTICASONE FUROATE AND VILANTEROL TRIFENATATE 1 PUFF: 100; 25 POWDER RESPIRATORY (INHALATION) at 10:08

## 2023-08-24 RX ADMIN — LEVALBUTEROL HYDROCHLORIDE 0.63 MG: 0.63 SOLUTION RESPIRATORY (INHALATION) at 08:08

## 2023-08-24 RX ADMIN — LEVALBUTEROL HYDROCHLORIDE 0.63 MG: 0.63 SOLUTION RESPIRATORY (INHALATION) at 03:08

## 2023-08-24 RX ADMIN — LEVALBUTEROL HYDROCHLORIDE 0.63 MG: 0.63 SOLUTION RESPIRATORY (INHALATION) at 12:08

## 2023-08-24 RX ADMIN — PANTOPRAZOLE SODIUM 40 MG: 40 INJECTION, POWDER, LYOPHILIZED, FOR SOLUTION INTRAVENOUS at 10:08

## 2023-08-24 RX ADMIN — METOPROLOL SUCCINATE 75 MG: 50 TABLET, EXTENDED RELEASE ORAL at 10:08

## 2023-08-24 NOTE — ANESTHESIA POSTPROCEDURE EVALUATION
Anesthesia Post Evaluation    Patient: Lizandro Martinez    Procedure(s) Performed: Procedure(s) (LRB):  COLON (N/A)  COLONOSCOPY, WITH HEMORRHAGE CONTROL    Final Anesthesia Type: general      Patient location during evaluation: GI PACU  Patient participation: Yes- Able to Participate  Level of consciousness: awake and alert  Post-procedure vital signs: reviewed and stable  Pain management: adequate  Airway patency: patent    PONV status at discharge: No PONV  Anesthetic complications: no      Cardiovascular status: blood pressure returned to baseline  Respiratory status: spontaneous ventilation and room air  Hydration status: euvolemic  Follow-up not needed.          Vitals Value Taken Time   /52 08/23/23 1218   Temp 36.6 °C (97.9 °F) 08/23/23 1200   Pulse 90 08/23/23 1218   Resp 22 08/23/23 1218   SpO2 95 % 08/23/23 1218         No case tracking events are documented in the log.      Pain/[place] Score: [place] Score: 10 (8/23/2023 12:19 PM)

## 2023-08-24 NOTE — PROGRESS NOTES
"Gastroenterology Progress Note    Subjective/Interval History:  VSS. Clear BM last night after colonoscopy.  No blood or stool.  No complaints.      Vital Signs:  /64   Pulse 75   Temp 98.8 °F (37.1 °C) (Oral)   Resp 16   Ht 5' 10" (1.778 m)   Wt 53.5 kg (118 lb)   SpO2 98%   BMI 16.93 kg/m²   Body mass index is 16.93 kg/m².    Physical Exam:  Physical Exam  Constitutional:       General: He is not in acute distress.     Appearance: He is not ill-appearing.   HENT:      Head: Normocephalic and atraumatic.   Eyes:      General: No scleral icterus.     Extraocular Movements: Extraocular movements intact.   Cardiovascular:      Rate and Rhythm: Normal rate.   Pulmonary:      Effort: Pulmonary effort is normal. No respiratory distress.   Abdominal:      General: Bowel sounds are normal. There is no distension.      Palpations: Abdomen is soft. There is no mass.      Tenderness: There is no abdominal tenderness. There is no guarding or rebound.   Musculoskeletal:      Right lower leg: No edema.      Left lower leg: No edema.   Skin:     General: Skin is warm and dry.      Coloration: Skin is not jaundiced.   Neurological:      Mental Status: He is alert and oriented to person, place, and time.   Psychiatric:         Mood and Affect: Mood normal.         Behavior: Behavior normal.       Labs:  Recent Results (from the past 48 hour(s))   Hemoglobin and Hematocrit    Collection Time: 08/22/23  9:54 AM   Result Value Ref Range    Hgb 7.1 (L) 14.0 - 18.0 g/dL    Hct 22.2 (L) 42.0 - 52.0 %   Basic Metabolic Panel    Collection Time: 08/23/23  5:05 AM   Result Value Ref Range    Sodium Level 141 136 - 145 mmol/L    Potassium Level 4.3 3.5 - 5.1 mmol/L    Chloride 108 (H) 98 - 107 mmol/L    Carbon Dioxide 23 23 - 31 mmol/L    Glucose Level 123 (H) 82 - 115 mg/dL    Blood Urea Nitrogen 13.0 8.4 - 25.7 mg/dL    Creatinine 0.99 0.73 - 1.18 mg/dL    BUN/Creatinine Ratio 13     Calcium Level Total 7.9 (L) 8.8 - 10.0 " mg/dL    Anion Gap 10.0 mEq/L    eGFR >60 mls/min/1.73/m2   CBC with Differential    Collection Time: 08/23/23  5:05 AM   Result Value Ref Range    WBC 11.22 4.50 - 11.50 x10(3)/mcL    RBC 2.51 (L) 4.70 - 6.10 x10(6)/mcL    Hgb 7.2 (L) 14.0 - 18.0 g/dL    Hct 20.7 (L) 42.0 - 52.0 %    MCV 82.5 80.0 - 94.0 fL    MCH 28.7 27.0 - 31.0 pg    MCHC 34.8 33.0 - 36.0 g/dL    RDW 15.6 11.5 - 17.0 %    Platelet 294 130 - 400 x10(3)/mcL    MPV 9.8 7.4 - 10.4 fL    Neut % 67.0 %    Lymph % 20.8 %    Mono % 8.1 %    Eos % 2.1 %    Basophil % 0.5 %    Lymph # 2.33 0.6 - 4.6 x10(3)/mcL    Neut # 7.51 2.1 - 9.2 x10(3)/mcL    Mono # 0.91 0.1 - 1.3 x10(3)/mcL    Eos # 0.24 0 - 0.9 x10(3)/mcL    Baso # 0.06 <=0.2 x10(3)/mcL    IG# 0.17 (H) 0 - 0.04 x10(3)/mcL    IG% 1.5 %    NRBC% 0.0 %   Comprehensive Metabolic Panel    Collection Time: 08/24/23  5:43 AM   Result Value Ref Range    Sodium Level 139 136 - 145 mmol/L    Potassium Level 3.9 3.5 - 5.1 mmol/L    Chloride 108 (H) 98 - 107 mmol/L    Carbon Dioxide 25 23 - 31 mmol/L    Glucose Level 112 82 - 115 mg/dL    Blood Urea Nitrogen 8.5 8.4 - 25.7 mg/dL    Creatinine 0.94 0.73 - 1.18 mg/dL    Calcium Level Total 7.9 (L) 8.8 - 10.0 mg/dL    Protein Total 4.7 (L) 5.8 - 7.6 gm/dL    Albumin Level 2.5 (L) 3.4 - 4.8 g/dL    Globulin 2.2 (L) 2.4 - 3.5 gm/dL    Albumin/Globulin Ratio 1.1 1.1 - 2.0 ratio    Bilirubin Total 0.8 <=1.5 mg/dL    Alkaline Phosphatase 42 40 - 150 unit/L    Alanine Aminotransferase 10 0 - 55 unit/L    Aspartate Aminotransferase 13 5 - 34 unit/L    eGFR >60 mls/min/1.73/m2   CBC with Differential    Collection Time: 08/24/23  5:43 AM   Result Value Ref Range    WBC 9.59 4.50 - 11.50 x10(3)/mcL    RBC 2.90 (L) 4.70 - 6.10 x10(6)/mcL    Hgb 8.3 (L) 14.0 - 18.0 g/dL    Hct 24.0 (L) 42.0 - 52.0 %    MCV 82.8 80.0 - 94.0 fL    MCH 28.6 27.0 - 31.0 pg    MCHC 34.6 33.0 - 36.0 g/dL    RDW 15.5 11.5 - 17.0 %    Platelet 221 130 - 400 x10(3)/mcL    MPV 9.3 7.4 - 10.4 fL     Neut % 72.6 %    Lymph % 15.7 %    Mono % 8.2 %    Eos % 2.3 %    Basophil % 0.3 %    Lymph # 1.51 0.6 - 4.6 x10(3)/mcL    Neut # 6.95 2.1 - 9.2 x10(3)/mcL    Mono # 0.79 0.1 - 1.3 x10(3)/mcL    Eos # 0.22 0 - 0.9 x10(3)/mcL    Baso # 0.03 <=0.2 x10(3)/mcL    IG# 0.09 (H) 0 - 0.04 x10(3)/mcL    IG% 0.9 %    NRBC% 0.0 %         Assessment/Plan:  87 yo CM known to Dr. Benjamin from recent admission.  Patient with a pmhx of pandiverticulosis, HTN, HLD, tobacco use, HFrEF 35%, recent dx of afib early this month for which Xarelto was started, then an admission 8/11-8/18 with GI bleeding secondary to right colon AVMsx2.  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      GI bleeding: secondary to ulcer at previous APC site from recent AVM in cecum     CT: +blush in cecum   Hgb 10.6 on 8/18 -- 9.4 on 8/21-- 8.3 -- 7.1 -- 2u prbcs -- 7.2 -- 2u prbcs -- 8.3   S/p colonoscopy 8/23/23:  hemorrhoids. At the site of prior cecal APC, there was a 5-6mm cratered ulcer with visible vesse and oozing, txed with epi/APC/4 clips. An additional 4-5mm cratered ulcer was seen in the ascending colon with no active bleeding, txed with clip given risk fo bleeding. 1.2cm flat polyp was visualized in the cecum, not removed.  Clotted blood throughout the colon.      -monitor h/h and Transfuse to hgb 7-8.  -Monitor stools for bleeding.  -xarelto on hold. ok to resume in 2 more days.   -repeat colonoscopy in 3 months due to colon polyp. Our office will arrange.   -advance diet and monitor.    Eboni Martinez PA-C

## 2023-08-24 NOTE — PROGRESS NOTES
[facility] Hospital Medicine Progress Note        Chief Complaint: Inpatient Follow-up    HPI:   88-year-old male with history of systolic heart failure with ejection fraction 35%, PAF on Xarelto, chronic tobacco use, HTN, HLD, medical noncompliance paid patient had a recent hospitalization with decompensated heart failure and GI bleed .  patient underwent colonoscopy which showed colonic angiodysplasia which was treated with APC.  Patient was discharged home after hemoglobin was stable .  Xarelto was resumed.  Patient presented back to the hospital with complaints of hematochezia after restarting Xarelto.  Labs showed worsening anemia, elevated INR.  CT abdomen/pelvis with concerns for active GI hemorrhage.  Patient was admitted hospitalist medicine service and GI services consulted.  Patient had recurrent episodes of hematochezia after admission .  GI reviewed images, suspect ulceration at the site of APC from colonoscopy.  Patient underwent repeat colonoscopy again this hospitalization which revealed hemorrhoids and solitary ulcer in the cecum which was injected with APC.  Recommended to restart Xarelto in 3 days after colonoscopy.  Closely monitoring hemoglobin and closely monitoring for overt bleeding    Interval Hx:   Patient seen at bedside.  Comfortably laying in bed.  No more hematochezia.  Hemodynamics stable.  Respiratory status stable.  No acute events overnight.    Objective/physical exam:  General: In no acute distress, afebrile  Chest: Clear to auscultation bilaterally  Heart: S1, S2, no appreciable murmur  Abdomen: Soft, nontender, BS +  MSK: Warm, no lower extremity edema, no clubbing or cyanosis  Neurologic: Alert and oriented x4, moving all extremities with good strength     VITAL SIGNS: 24 HRS MIN & MAX LAST   Temp  Min: 97.4 °F (36.3 °C)  Max: 98.8 °F (37.1 °C) 98.8 °F (37.1 °C)   BP  Min: 105/61  Max: 138/73 131/64   Pulse  Min: 62  Max: 99  72   Resp  Min: 14   Max: 24 14   SpO2  Min: 95 %  Max: 98 % 97 %       Recent Labs   Lab 08/24/23  0543   WBC 9.59   RBC 2.90*   HGB 8.3*   HCT 24.0*   MCV 82.8   MCH 28.6   MCHC 34.6   RDW 15.5      MPV 9.3         Recent Labs   Lab 08/18/23  0407 08/21/23  2336 08/24/23  0543      < > 139   K 4.1   < > 3.9   CO2 31   < > 25   BUN 25.8*   < > 8.5   CREATININE 1.19*   < > 0.94   CALCIUM 9.3   < > 7.9*   MG 2.00  --   --    ALBUMIN 2.9*   < > 2.5*   ALKPHOS 57   < > 42   ALT 28   < > 10   AST 18   < > 13   BILITOT 0.3   < > 0.8    < > = values in this interval not displayed.          Microbiology Results (last 7 days)       ** No results found for the last 168 hours. **             Scheduled Med:   fluticasone furoate-vilanteroL  1 puff Inhalation Daily    levalbuterol  0.63 mg Nebulization Q8H    LIDOcaine (PF) 10 mg/ml (1%)  1 mL Intradermal Once    metoprolol succinate  75 mg Oral Daily    pantoprazole  40 mg Intravenous Daily          Assessment/Plan:    Acute/recurrent GI bleed secondary to cecal ulceration status post APC  Recent diagnosis cecal angiodysplasia treated with APC  Hemorrhoids  Acute blood loss anemia secondary to GI bleed-improved  Chronic systolic heart failure with ejection fraction-35% age, appears compensated   Chronic interstitial lung disease   PAF-Xarelto on hold   Chronic tobacco use   Essential HTN-stable   HLD  History of medical noncompliance   Prophylaxis    No more hematochezia  Xarelto on hold   GI okay with resuming Xarelto 3 days post colonoscopy  Colonoscopy this hospitalization revealed hemorrhoids and cecal ulceration which was appropriately treated with APC   Hemoglobin is more than 8 today  No indication for transfusion  Continue home meds-bronchodilators   Continue Lasix, Toprol-XL, valsartan  Patient missed cardiology appointment secondary to being in the hospital, will have to reschedule   He is awaiting ischemic workup as outpatient  Continue PPI  DVT prophylaxis-no chemical  prophylaxis given GI bleed, bilateral SCDs        Kaylin Mohan MD   08/24/2023

## 2023-08-25 LAB
ABO + RH BLD: NORMAL
ALBUMIN SERPL-MCNC: 2.4 G/DL (ref 3.4–4.8)
ALBUMIN/GLOB SERPL: 1 RATIO (ref 1.1–2)
ALP SERPL-CCNC: 41 UNIT/L (ref 40–150)
ALT SERPL-CCNC: 10 UNIT/L (ref 0–55)
AST SERPL-CCNC: 13 UNIT/L (ref 5–34)
BASOPHILS # BLD AUTO: 0.05 X10(3)/MCL
BASOPHILS NFR BLD AUTO: 0.5 %
BILIRUB SERPL-MCNC: 0.2 MG/DL
BLD PROD TYP BPU: NORMAL
BLOOD UNIT EXPIRATION DATE: NORMAL
BLOOD UNIT TYPE CODE: 6200
BUN SERPL-MCNC: 13.1 MG/DL (ref 8.4–25.7)
CALCIUM SERPL-MCNC: 7.9 MG/DL (ref 8.8–10)
CHLORIDE SERPL-SCNC: 109 MMOL/L (ref 98–107)
CO2 SERPL-SCNC: 27 MMOL/L (ref 23–31)
CREAT SERPL-MCNC: 1.03 MG/DL (ref 0.73–1.18)
CROSSMATCH INTERPRETATION: NORMAL
DISPENSE STATUS: NORMAL
EOSINOPHIL # BLD AUTO: 0.25 X10(3)/MCL (ref 0–0.9)
EOSINOPHIL NFR BLD AUTO: 2.7 %
ERYTHROCYTE [DISTWIDTH] IN BLOOD BY AUTOMATED COUNT: 15.8 % (ref 11.5–17)
GFR SERPLBLD CREATININE-BSD FMLA CKD-EPI: >60 MLS/MIN/1.73/M2
GLOBULIN SER-MCNC: 2.3 GM/DL (ref 2.4–3.5)
GLUCOSE SERPL-MCNC: 136 MG/DL (ref 82–115)
GROUP & RH: NORMAL
HCT VFR BLD AUTO: 23.2 % (ref 42–52)
HGB BLD-MCNC: 7.8 G/DL (ref 14–18)
IMM GRANULOCYTES # BLD AUTO: 0.08 X10(3)/MCL (ref 0–0.04)
IMM GRANULOCYTES NFR BLD AUTO: 0.9 %
INDIRECT COOMBS GEL: NORMAL
LYMPHOCYTES # BLD AUTO: 1.56 X10(3)/MCL (ref 0.6–4.6)
LYMPHOCYTES NFR BLD AUTO: 17 %
MCH RBC QN AUTO: 28.7 PG (ref 27–31)
MCHC RBC AUTO-ENTMCNC: 33.6 G/DL (ref 33–36)
MCV RBC AUTO: 85.3 FL (ref 80–94)
MONOCYTES # BLD AUTO: 0.88 X10(3)/MCL (ref 0.1–1.3)
MONOCYTES NFR BLD AUTO: 9.6 %
NEUTROPHILS # BLD AUTO: 6.36 X10(3)/MCL (ref 2.1–9.2)
NEUTROPHILS NFR BLD AUTO: 69.3 %
NRBC BLD AUTO-RTO: 0 %
PLATELET # BLD AUTO: 224 X10(3)/MCL (ref 130–400)
PMV BLD AUTO: 9.5 FL (ref 7.4–10.4)
POTASSIUM SERPL-SCNC: 4.1 MMOL/L (ref 3.5–5.1)
PROT SERPL-MCNC: 4.7 GM/DL (ref 5.8–7.6)
RBC # BLD AUTO: 2.72 X10(6)/MCL (ref 4.7–6.1)
SODIUM SERPL-SCNC: 140 MMOL/L (ref 136–145)
SPECIMEN OUTDATE: NORMAL
UNIT NUMBER: NORMAL
WBC # SPEC AUTO: 9.18 X10(3)/MCL (ref 4.5–11.5)

## 2023-08-25 PROCEDURE — 80053 COMPREHEN METABOLIC PANEL: CPT | Performed by: INTERNAL MEDICINE

## 2023-08-25 PROCEDURE — 85025 COMPLETE CBC W/AUTO DIFF WBC: CPT | Performed by: INTERNAL MEDICINE

## 2023-08-25 PROCEDURE — 25000003 PHARM REV CODE 250: Performed by: HOSPITALIST

## 2023-08-25 PROCEDURE — 94761 N-INVAS EAR/PLS OXIMETRY MLT: CPT

## 2023-08-25 PROCEDURE — 25000242 PHARM REV CODE 250 ALT 637 W/ HCPCS: Performed by: HOSPITALIST

## 2023-08-25 PROCEDURE — 99900031 HC PATIENT EDUCATION (STAT)

## 2023-08-25 PROCEDURE — 86900 BLOOD TYPING SEROLOGIC ABO: CPT | Performed by: INTERNAL MEDICINE

## 2023-08-25 PROCEDURE — 21400001 HC TELEMETRY ROOM

## 2023-08-25 PROCEDURE — P9016 RBC LEUKOCYTES REDUCED: HCPCS | Performed by: INTERNAL MEDICINE

## 2023-08-25 PROCEDURE — 86923 COMPATIBILITY TEST ELECTRIC: CPT | Performed by: INTERNAL MEDICINE

## 2023-08-25 PROCEDURE — 63600175 PHARM REV CODE 636 W HCPCS: Performed by: INTERNAL MEDICINE

## 2023-08-25 PROCEDURE — 63600175 PHARM REV CODE 636 W HCPCS: Performed by: HOSPITALIST

## 2023-08-25 PROCEDURE — 94640 AIRWAY INHALATION TREATMENT: CPT

## 2023-08-25 PROCEDURE — 25000242 PHARM REV CODE 250 ALT 637 W/ HCPCS: Performed by: STUDENT IN AN ORGANIZED HEALTH CARE EDUCATION/TRAINING PROGRAM

## 2023-08-25 PROCEDURE — 99900035 HC TECH TIME PER 15 MIN (STAT)

## 2023-08-25 PROCEDURE — 25000003 PHARM REV CODE 250: Performed by: INTERNAL MEDICINE

## 2023-08-25 PROCEDURE — C9113 INJ PANTOPRAZOLE SODIUM, VIA: HCPCS | Performed by: HOSPITALIST

## 2023-08-25 RX ORDER — FUROSEMIDE 10 MG/ML
20 INJECTION INTRAMUSCULAR; INTRAVENOUS ONCE
Status: COMPLETED | OUTPATIENT
Start: 2023-08-25 | End: 2023-08-25

## 2023-08-25 RX ORDER — HYDROCODONE BITARTRATE AND ACETAMINOPHEN 500; 5 MG/1; MG/1
TABLET ORAL
Status: DISCONTINUED | OUTPATIENT
Start: 2023-08-25 | End: 2023-08-26 | Stop reason: HOSPADM

## 2023-08-25 RX ADMIN — LEVALBUTEROL HYDROCHLORIDE 0.63 MG: 0.63 SOLUTION RESPIRATORY (INHALATION) at 12:08

## 2023-08-25 RX ADMIN — PANTOPRAZOLE SODIUM 40 MG: 40 INJECTION, POWDER, LYOPHILIZED, FOR SOLUTION INTRAVENOUS at 09:08

## 2023-08-25 RX ADMIN — FLUTICASONE FUROATE AND VILANTEROL TRIFENATATE 1 PUFF: 100; 25 POWDER RESPIRATORY (INHALATION) at 05:08

## 2023-08-25 RX ADMIN — METOPROLOL SUCCINATE 75 MG: 50 TABLET, EXTENDED RELEASE ORAL at 09:08

## 2023-08-25 RX ADMIN — LEVALBUTEROL HYDROCHLORIDE 0.63 MG: 0.63 SOLUTION RESPIRATORY (INHALATION) at 05:08

## 2023-08-25 RX ADMIN — FUROSEMIDE 40 MG: 40 TABLET ORAL at 09:08

## 2023-08-25 RX ADMIN — VALSARTAN 40 MG: 40 TABLET, FILM COATED ORAL at 05:08

## 2023-08-25 RX ADMIN — LEVALBUTEROL HYDROCHLORIDE 0.63 MG: 0.63 SOLUTION RESPIRATORY (INHALATION) at 09:08

## 2023-08-25 RX ADMIN — FUROSEMIDE 20 MG: 10 INJECTION, SOLUTION INTRAMUSCULAR; INTRAVENOUS at 03:08

## 2023-08-25 NOTE — CONSULTS
Inpatient consult to Cardiology  Consult performed by: Dilan Crockett FNP  Consult ordered by: Kaylin Mohan MD  Reason for consult: AF/OAC Recommendations        [facility] - [facility] Tele[facility]    Cardiology  Consult Note    Patient Name: Lizandro Martinez  MRN: [id number]  Admission Date: 8/21/2023  Hospital Length of Stay: 3 days  Code Status: Prior   Attending Provider: Kaylin Mohan MD   Consulting Provider: CHARLIE Medrano  Primary Care Physician: [facility]  Principal Problem:<principal problem not specified>    Patient information was obtained from patient, relative(s), ER records, and primary team.     Subjective:   Consultation Reason: AF/OAC Recommendations    HPI:   Mr. Martinez is an 88 year old male, known to [facility] from previous hospitalizations, who presented to the hospital with complaints of hematochezia after restarting Xarelto. Patient with history of Persistent AF, has been admitted 3 times in the month of August. Recent admission with GI Bleed, Xarelto was stopped and restarted. EF noted to be 35%, CLINICALLY Compensated and scheduled for outpatient [facility]ael PET and EP Consultation to evaluate patient for GALA Closure procedure. He is admitted to Hospital Medicine Service. Patient & Family hesitant to restart OAC given recurrent GI Bleed and elevated bleed risk. [facility] consulted for OAC Recommendations.     PMH: AF (Xarelto), LVSD EF 45-50%, Hypertension, Hyperlipidemia, Chronic Pain, History of Medical Noncompliance, Nicotine Dependence  PSH: Fracture Surgery  Family History: Mother- Hypertension/Hyperlipidemia/Cancer, Father- Dementia  Social History: Tobacco- Heavy Smoker/Active Use, Alcohol- Daily Use/6 Pack Beer Daily, Substance Abuse- Negative     Previous Cardiac Diagnostics:   CT Chest with/without Contrast (8.12.23):  IMPRESSION  Widespread chronic lung parenchymal changes without evidence of acute pulmonary process.  Small volume pericardial and bilateral  pleural effusions.  Chronic sequela of prior granulomatous disease.  Additional chronic secondary details discussed above.     Echocardiogram (8.12.23):  Left Ventricle: The left ventricle is normal in size. Mildly increased wall thickness. Moderate global hypokinesis present. There is moderately reduced systolic function. Ejection fraction by visual approximation is 35%.  Left Atrium: Left atrium is severely dilated.  Right Ventricle: Mild right ventricular enlargement. Systolic function is mildly reduced.  Right Atrium: Right atrium is moderately dilated.  Aortic Valve: There is mild aortic valve sclerosis. There is mild aortic regurgitation.  Mitral Valve: There is no stenosis. There is mild regurgitation.  Tricuspid Valve: There is mild regurgitation.  Pulmonic Valve: There is no significant stenosis. There is mild regurgitation.  Pericardium: Left pleural effusion.     Echocardiogram (8.5.23):  Left Ventricle: The left ventricle is mildly dilated. Normal wall thickness. Normal wall motion. There is mildly reduced systolic function with a visually estimated ejection fraction of 45 - 50%. Unable to assess diastolic function due to arrhythmia. Elevated left ventricular filling pressure.  Left Atrium: Left atrium is severely dilated.  Right Ventricle: Mild right ventricular enlargement.   Systolic function is normal.Right Atrium: Right atrium is severely dilated.  Aortic Valve: The aortic valve is a trileaflet valve. Mildly restricted motion.  Mitral Valve: Moderately thickened leaflets. There is mild regurgitation.  Pulmonic Valve: There is mild regurgitation.  Pericardium: Small circumferential pericardial effusion present. No indication of cardiac tamponade.    Review of patient's allergies indicates:   Allergen Reactions    Colestipol     Meloxicam     Rosuvastatin     Simvastatin     Statins-hmg-coa reductase inhibitors     Tramadol        No current facility-administered medications on file prior to  encounter.     Current Outpatient Medications on File Prior to Encounter   Medication Sig    furosemide (LASIX) 40 MG tablet Take 1 tablet (40 mg total) by mouth once daily.    metoprolol succinate (TOPROL-XL) 25 MG 24 hr tablet Take 3 tablets (75 mg total) by mouth once daily.    rivaroxaban (XARELTO) 20 mg Tab Take 1 tablet (20 mg total) by mouth daily with dinner or evening meal.    valsartan (DIOVAN) 40 MG tablet Take 1 tablet (40 mg total) by mouth once daily.    aspirin 81 MG Chew Take 1 tablet (81 mg total) by mouth once daily.    fluticasone furoate-vilanteroL (BREO) 100-25 mcg/dose diskus inhaler Inhale 1 puff into the lungs once daily. Controller    [] guaiFENesin 100 mg/5 ml (ROBITUSSIN) 100 mg/5 mL syrup Take 10 mLs (200 mg total) by mouth 4 (four) times daily as needed for Congestion or Cough.    hawthorn 500 mg Cap Take 1 capsule by mouth.    levalbuterol (XOPENEX) 0.63 mg/3 mL nebulizer solution Take 3 mLs (0.63 mg total) by nebulization every 8 (eight) hours. Rescue    pantoprazole (PROTONIX) 40 MG tablet Take 1 tablet (40 mg total) by mouth 2 (two) times daily before meals.    sucralfate (CARAFATE) 1 gram tablet Take 1 tablet (1 g total) by mouth 4 (four) times daily before meals and nightly.     Review of Systems   Respiratory:  Negative for chest tightness and shortness of breath.    Gastrointestinal:  Positive for blood in stool.   All other systems reviewed and are negative.    Objective:     Vital Signs (Most Recent):  Temp: 98.5 °F (36.9 °C) (23 0814)  Pulse: 68 (23 0902)  Resp: 18 (23 0902)  BP: 137/74 (23 0814)  SpO2: 95 % (23) Vital Signs (24h Range):  Temp:  [98 °F (36.7 °C)-98.5 °F (36.9 °C)] 98.5 °F (36.9 °C)  Pulse:  [66-79] 68  Resp:  [12-18] 18  SpO2:  [95 %-99 %] 95 %  BP: (100-137)/(51-74) 137/74     Weight: 53.5 kg (118 lb)  Body mass index is 16.93 kg/m².    SpO2: 95 %         Intake/Output Summary (Last 24 hours) at 2023 1154  Last  data filed at 8/24/2023 1400  Gross per 24 hour   Intake 1260 ml   Output 200 ml   Net 1060 ml       Lines/Drains/Airways       Peripheral Intravenous Line  Duration                  Peripheral IV - Single Lumen 08/21/23 2245 20 G Anterior;Left Forearm 3 days         Peripheral IV - Single Lumen 08/23/23 0941 20 G Anterior;Proximal;Right Forearm 2 days                    Significant Labs:  Recent Results (from the past 72 hour(s))   Basic Metabolic Panel    Collection Time: 08/23/23  5:05 AM   Result Value Ref Range    Sodium Level 141 136 - 145 mmol/L    Potassium Level 4.3 3.5 - 5.1 mmol/L    Chloride 108 (H) 98 - 107 mmol/L    Carbon Dioxide 23 23 - 31 mmol/L    Glucose Level 123 (H) 82 - 115 mg/dL    Blood Urea Nitrogen 13.0 8.4 - 25.7 mg/dL    Creatinine 0.99 0.73 - 1.18 mg/dL    BUN/Creatinine Ratio 13     Calcium Level Total 7.9 (L) 8.8 - 10.0 mg/dL    Anion Gap 10.0 mEq/L    eGFR >60 mls/min/1.73/m2   CBC with Differential    Collection Time: 08/23/23  5:05 AM   Result Value Ref Range    WBC 11.22 4.50 - 11.50 x10(3)/mcL    RBC 2.51 (L) 4.70 - 6.10 x10(6)/mcL    Hgb 7.2 (L) 14.0 - 18.0 g/dL    Hct 20.7 (L) 42.0 - 52.0 %    MCV 82.5 80.0 - 94.0 fL    MCH 28.7 27.0 - 31.0 pg    MCHC 34.8 33.0 - 36.0 g/dL    RDW 15.6 11.5 - 17.0 %    Platelet 294 130 - 400 x10(3)/mcL    MPV 9.8 7.4 - 10.4 fL    Neut % 67.0 %    Lymph % 20.8 %    Mono % 8.1 %    Eos % 2.1 %    Basophil % 0.5 %    Lymph # 2.33 0.6 - 4.6 x10(3)/mcL    Neut # 7.51 2.1 - 9.2 x10(3)/mcL    Mono # 0.91 0.1 - 1.3 x10(3)/mcL    Eos # 0.24 0 - 0.9 x10(3)/mcL    Baso # 0.06 <=0.2 x10(3)/mcL    IG# 0.17 (H) 0 - 0.04 x10(3)/mcL    IG% 1.5 %    NRBC% 0.0 %   Comprehensive Metabolic Panel    Collection Time: 08/24/23  5:43 AM   Result Value Ref Range    Sodium Level 139 136 - 145 mmol/L    Potassium Level 3.9 3.5 - 5.1 mmol/L    Chloride 108 (H) 98 - 107 mmol/L    Carbon Dioxide 25 23 - 31 mmol/L    Glucose Level 112 82 - 115 mg/dL    Blood Urea Nitrogen 8.5  8.4 - 25.7 mg/dL    Creatinine 0.94 0.73 - 1.18 mg/dL    Calcium Level Total 7.9 (L) 8.8 - 10.0 mg/dL    Protein Total 4.7 (L) 5.8 - 7.6 gm/dL    Albumin Level 2.5 (L) 3.4 - 4.8 g/dL    Globulin 2.2 (L) 2.4 - 3.5 gm/dL    Albumin/Globulin Ratio 1.1 1.1 - 2.0 ratio    Bilirubin Total 0.8 <=1.5 mg/dL    Alkaline Phosphatase 42 40 - 150 unit/L    Alanine Aminotransferase 10 0 - 55 unit/L    Aspartate Aminotransferase 13 5 - 34 unit/L    eGFR >60 mls/min/1.73/m2   CBC with Differential    Collection Time: 08/24/23  5:43 AM   Result Value Ref Range    WBC 9.59 4.50 - 11.50 x10(3)/mcL    RBC 2.90 (L) 4.70 - 6.10 x10(6)/mcL    Hgb 8.3 (L) 14.0 - 18.0 g/dL    Hct 24.0 (L) 42.0 - 52.0 %    MCV 82.8 80.0 - 94.0 fL    MCH 28.6 27.0 - 31.0 pg    MCHC 34.6 33.0 - 36.0 g/dL    RDW 15.5 11.5 - 17.0 %    Platelet 221 130 - 400 x10(3)/mcL    MPV 9.3 7.4 - 10.4 fL    Neut % 72.6 %    Lymph % 15.7 %    Mono % 8.2 %    Eos % 2.3 %    Basophil % 0.3 %    Lymph # 1.51 0.6 - 4.6 x10(3)/mcL    Neut # 6.95 2.1 - 9.2 x10(3)/mcL    Mono # 0.79 0.1 - 1.3 x10(3)/mcL    Eos # 0.22 0 - 0.9 x10(3)/mcL    Baso # 0.03 <=0.2 x10(3)/mcL    IG# 0.09 (H) 0 - 0.04 x10(3)/mcL    IG% 0.9 %    NRBC% 0.0 %   Comprehensive Metabolic Panel    Collection Time: 08/25/23  5:26 AM   Result Value Ref Range    Sodium Level 140 136 - 145 mmol/L    Potassium Level 4.1 3.5 - 5.1 mmol/L    Chloride 109 (H) 98 - 107 mmol/L    Carbon Dioxide 27 23 - 31 mmol/L    Glucose Level 136 (H) 82 - 115 mg/dL    Blood Urea Nitrogen 13.1 8.4 - 25.7 mg/dL    Creatinine 1.03 0.73 - 1.18 mg/dL    Calcium Level Total 7.9 (L) 8.8 - 10.0 mg/dL    Protein Total 4.7 (L) 5.8 - 7.6 gm/dL    Albumin Level 2.4 (L) 3.4 - 4.8 g/dL    Globulin 2.3 (L) 2.4 - 3.5 gm/dL    Albumin/Globulin Ratio 1.0 (L) 1.1 - 2.0 ratio    Bilirubin Total 0.2 <=1.5 mg/dL    Alkaline Phosphatase 41 40 - 150 unit/L    Alanine Aminotransferase 10 0 - 55 unit/L    Aspartate Aminotransferase 13 5 - 34 unit/L    eGFR >60  mls/min/1.73/m2   CBC with Differential    Collection Time: 08/25/23  5:26 AM   Result Value Ref Range    WBC 9.18 4.50 - 11.50 x10(3)/mcL    RBC 2.72 (L) 4.70 - 6.10 x10(6)/mcL    Hgb 7.8 (L) 14.0 - 18.0 g/dL    Hct 23.2 (L) 42.0 - 52.0 %    MCV 85.3 80.0 - 94.0 fL    MCH 28.7 27.0 - 31.0 pg    MCHC 33.6 33.0 - 36.0 g/dL    RDW 15.8 11.5 - 17.0 %    Platelet 224 130 - 400 x10(3)/mcL    MPV 9.5 7.4 - 10.4 fL    Neut % 69.3 %    Lymph % 17.0 %    Mono % 9.6 %    Eos % 2.7 %    Basophil % 0.5 %    Lymph # 1.56 0.6 - 4.6 x10(3)/mcL    Neut # 6.36 2.1 - 9.2 x10(3)/mcL    Mono # 0.88 0.1 - 1.3 x10(3)/mcL    Eos # 0.25 0 - 0.9 x10(3)/mcL    Baso # 0.05 <=0.2 x10(3)/mcL    IG# 0.08 (H) 0 - 0.04 x10(3)/mcL    IG% 0.9 %    NRBC% 0.0 %   Prepare RBC 2 Units; Severe anemia    Collection Time: 08/25/23  8:23 AM   Result Value Ref Range    UNIT NUMBER [id number]     UNIT ABO/RH A POS     DISPENSE STATUS Selected     Unit Expiration [id number]     Product Code G0296A64     Unit Blood Type Code 6200     CROSSMATCH INTERPRETATION Compatible     UNIT NUMBER [id number]     UNIT ABO/RH A POS     DISPENSE STATUS Selected     Unit Expiration [id number]     Product Code A5683V43     Unit Blood Type Code 6200     CROSSMATCH INTERPRETATION Compatible    Type & Screen    Collection Time: 08/25/23  8:23 AM   Result Value Ref Range    Group & Rh A POS     Indirect Torrey GEL NEG     Specimen Outdate 08/28/2023 23:59        Significant Imaging:  Imaging Results              CT Abdomen Pelvis W Wo Contrast (Final result)  Result time 08/22/23 08:12:38      Final result by Jean Pierre Thomson MD (08/22/23 08:12:38)                   Impression:      Small globular area intraluminal contrast within the cecum consistent with active hemorrhage.    Agree with nighthawk.      Electronically signed by: Sandy Thomson MD  Date:    08/22/2023  Time:    08:12               Narrative:    EXAMINATION:  CT ABDOMEN PELVIS W WO CONTRAST    CLINICAL  HISTORY:  GI bleed;    TECHNIQUE:  Low dose axial images, sagittal and coronal reformations were obtained from the lung bases to the pubic symphysis before and following the IV administration of 100 mL of Omnipaque 350.  .  Oral contrast not given.  DLP 1242.  Automated exposure control used.    COMPARISON:  None    FINDINGS:  Delayed phase images demonstrate a focus of blushing within the cecum consistent with active hemorrhage.  No inflammatory changes or bowel dilation evident.  No lymphadenopathy.    Liver normal enhancement with no focal lesion.    Gallbladder and biliary ductal system normal.    Pancreas, stomach, adrenal glands normal.  Numerous splenic calcifications.    Kidneys normal enhancement with no hydronephrosis.    Aorta tapers normally.  Severe atherosclerotic calcification.    Bladder and rectum normal.    Bones intact.    Mild left pleural fluid, moderately prominent peripheral distribution chronic appearing interstitial changes with small areas of fibrosis.                        Preliminary result by Jean Pierre Thomson MD (08/22/23 00:37:25)                   Narrative:    START OF REPORT:  Technique: CT of the abdomen and pelvis was performed with axial images as well as sagittal and coronal reconstruction images without and with intravenous contrast.    Comparison: None available.    Clinical History: Gi bleeding after taking blood thinners today.    Dosage Information: Automated Exposure Control was utilized.    Findings:  Thorax: Calcified subcarinal and right hilar lymph nodes are seen.  Lungs: Mild subpleural reticular opacities are seen in bilateral lower lobes. There is a 5 mm calcified nodule in the right lower lobe.  Pleura: There is a small left pleural effusion.  Heart: Moderate cardiomegaly is seen. Trace pericardial effusion is seen.  Abdomen:  Abdominal Wall: No abdominal wall pathology is seen.  Liver: The liver appears unremarkable.  Biliary System: No intrahepatic or  extrahepatic biliary duct dilatation is seen.  Gallbladder: The gallbladder appears unremarkable.  Pancreas: The pancreas appears unremarkable.  Spleen: Multiple calcified granulomas are seen in an otherwise unremarkable appearing spleen.  Adrenals: The adrenal glands appear unremarkable.  Kidneys: A single cyst measuring 1.4 cm is seen on series 14, image 35 in the mid pole of the left kidney. The left kidney otherwise appears unremarkable with no stones masses or hydronephrosis identified. Two cysts are identified in the right kidney the largest of which measures 2.4 x 2.1 cm is seen on series 14, image 33 in the mid pole of the right kidney. The right kidney otherwise appears unremarkable with no stones masses or hydronephrosis identified. On the excretory phase images, there is symmetric excretion of contrast and no filling defects are identified in the collecting systems.  Aorta: There is moderate calcification of the abdominal aorta and its branches.  IVC: Unremarkable.  Bowel: Contrast pooling is identified within the cecum on the venous phase of the study (series 14, images 48-53 and series 15, images 45-50), consistent with active gastrointestinal bleed. No bowel wall thickening is seen.  Esophagus: There is a small hiatal hernia.  Stomach: The stomach appears unremarkable.  Duodenum: Unremarkable appearing duodenum.  Small Bowel: The small bowel appears unremarkable.  Colon: Multiple diverticula are seen in the colon. No associated inflammatory stranding or pericolonic fluid is seen to suggest diverticulitis.  Appendix: The appendix appears unremarkable (series 14, images 55-57).  Peritoneum: No intraperitoneal free air or ascites is seen.    Pelvis:  Bladder: The bladder appears unremarkable.  Male:  Prostate gland: The prostate gland appears unremarkable. There are a few calcifications in the prostate gland.    Bony structures:  Dorsal Spine: There is moderate spondylosis of the visualized dorsal  spine.  Bony Pelvis: The visualized bony structures of the pelvis appear unremarkable.    Notifications: The results were discussed with the emergency room physician (Dr Rao) prior to dictation at 2023-08-22 01:23:15 CDT.      Impression:  1. There is a small hiatal hernia.  2. Contrast pooling is identified within the cecum on the venous phase of the study (series 14, images 48-53 and series 15, images 45-50), consistent with active gastrointestinal bleed. No bowel wall thickening is seen. Correlate clinically as regards further evaluation and followup.  3. There is a small left pleural effusion.  4. Mild subpleural reticular opacities are seen in bilateral lower lobes.  5. Details and other findings as discussed above.                                      Telemetry:  AF    Physical Exam  Vitals and nursing note reviewed.   Constitutional:       General: He is not in acute distress.     Appearance: Normal appearance.   HENT:      Head: Normocephalic.   Cardiovascular:      Rate and Rhythm: Normal rate. Rhythm irregular.   Pulmonary:      Effort: Pulmonary effort is normal. No respiratory distress.   Musculoskeletal:      Cervical back: Neck supple.   Skin:     General: Skin is warm and dry.   Neurological:      Mental Status: He is alert. Mental status is at baseline.   Psychiatric:         Behavior: Behavior normal.       Home Medications:   No current facility-administered medications on file prior to encounter.     Current Outpatient Medications on File Prior to Encounter   Medication Sig Dispense Refill    furosemide (LASIX) 40 MG tablet Take 1 tablet (40 mg total) by mouth once daily. 30 tablet 2    metoprolol succinate (TOPROL-XL) 25 MG 24 hr tablet Take 3 tablets (75 mg total) by mouth once daily. 90 tablet 2    rivaroxaban (XARELTO) 20 mg Tab Take 1 tablet (20 mg total) by mouth daily with dinner or evening meal. 30 tablet 0    valsartan (DIOVAN) 40 MG tablet Take 1 tablet (40 mg total) by mouth once  daily. 90 tablet 3    aspirin 81 MG Chew Take 1 tablet (81 mg total) by mouth once daily. 30 tablet 0    fluticasone furoate-vilanteroL (BREO) 100-25 mcg/dose diskus inhaler Inhale 1 puff into the lungs once daily. Controller 30 each 0    [] guaiFENesin 100 mg/5 ml (ROBITUSSIN) 100 mg/5 mL syrup Take 10 mLs (200 mg total) by mouth 4 (four) times daily as needed for Congestion or Cough. 100 mL 0    hawthorn 500 mg Cap Take 1 capsule by mouth.      levalbuterol (XOPENEX) 0.63 mg/3 mL nebulizer solution Take 3 mLs (0.63 mg total) by nebulization every 8 (eight) hours. Rescue 270 mL 0    pantoprazole (PROTONIX) 40 MG tablet Take 1 tablet (40 mg total) by mouth 2 (two) times daily before meals. 60 tablet 0    sucralfate (CARAFATE) 1 gram tablet Take 1 tablet (1 g total) by mouth 4 (four) times daily before meals and nightly. 120 tablet 0     Current Inpatient Medications:    Current Facility-Administered Medications:     0.9%  NaCl infusion (for blood administration), , Intravenous, Q24H PRN, Aurora Fernández, CHARLIE, Stopped at 23 0635    0.9%  NaCl infusion (for blood administration), , Intravenous, Q24H PRN, Giovanny Salgado MD    0.9%  NaCl infusion (for blood administration), , Intravenous, Q24H PRN, Ton Minor MD    0.9%  NaCl infusion (for blood administration), , Intravenous, Q24H PRN, Naman Lomeli MD    0.9%  NaCl infusion (for blood administration), , Intravenous, Q24H PRN, Kaylin Mohan MD    fluticasone furoate-vilanteroL 100-25 mcg/dose diskus inhaler 1 puff, 1 puff, Inhalation, Daily, Giovanny Salgado MD, 1 puff at 23 1025    furosemide injection 20 mg, 20 mg, Intravenous, Once, Kaylin Mohan MD    furosemide tablet 40 mg, 40 mg, Oral, Daily, Kaylin Mohan MD, 40 mg at 23 0957    levalbuterol nebulizer solution 0.63 mg, 0.63 mg, Nebulization, Q8H, Naman Lomeli MD, 0.63 mg at 23 0902    LIDOcaine (PF) 10 mg/ml (1%) injection 10 mg, 1 mL, Intradermal, Once, Dileep Mcdaniels  CHRISTOPHER Recinos MD    metoprolol succinate 24 hr tablet 75 mg, 75 mg, Oral, Daily, Giovanny Salgado MD, 75 mg at 08/25/23 0957    ondansetron disintegrating tablet 8 mg, 8 mg, Oral, Q6H PRN, Dileep Mcdaniels Jr., MD    pantoprazole injection 40 mg, 40 mg, Intravenous, Daily, Giovanny Salgado MD, 40 mg at 08/25/23 0957    valsartan tablet 40 mg, 40 mg, Oral, Daily, Kaylin Mohan MD    VTE Risk Mitigation (From admission, onward)      None          Assessment:   Persistent AF- Now AF CVR    - [id number]: 4 (LVSD/HTN/Age)    - Xarelto Discontinued due to Recurrent GI Bleed Risk > Benefit  Chronic Systolic Heart Failure    - Compensated   Left Ventricular Systolic Dysfunction (Unspecified for Now)- Planning outpatient Stress Test    - EF 35%   Recurrent GI Bleed    - Small globular area intraluminal contrast within the cecum consistent with active hemorrhage (CT Imaging)  Chronic Interstitial Lung Disease  Hypertension (BP Stable)  Hyperlipidemia    - Statin Intolerance  Anemia  History of Medical Noncompliance  History of Polysubstance Abuse- Nicotine Dependence/History of Heavy Tobacco Use & Regular Alcohol Consumption (Reported Recent Cessation)    Plan:   Discontinue Anticoagulation given elevated bleed risk in the setting of Recurrent GI Bleed (Risk > Benefit)  Continue Beta Blockade & ARB  Keep outpatient Stress Test Re: LVSD  Will arrange outpatient Cardiology follow up with Dr. Carlisle at request of patient/Family.  Will consider GALA Closure Evaluation with Dr. Skelton in the outpatient setting.  GI Workup in Progress.  Case/[facility] Plan of Care discussed with Patient/Family/Relative/Attending  Will sign off. Please call if needed.     Thank you for your consult.     Dilan Crockett, CHARLIE  Cardiology  [facility] Tele[facility]  08/25/2023 11:54 AM     I have seen the patient, reviewed the Nurse Practitioner's note, assessment and plan. I have personally interviewed and examined the patient at bedside and  agree with the findings. Medical decision making listed above were done under my guidance.    Physical exam:  Cardiovascular system: irregular rhythm, no murmur.  Lungs: CTAB.  Extremities: No leg edema.    Plan:  This point we will hold anticoagulation due to recurrent GI bleeds.  Risks benefit ratio does not justify anticoagulation at this time

## 2023-08-25 NOTE — PROGRESS NOTES
[facility] Hospital Medicine Progress Note        Chief Complaint: Inpatient Follow-up    HPI:   88-year-old male with history of systolic heart failure with ejection fraction 35%, PAF on Xarelto, chronic tobacco use, HTN, HLD, medical noncompliance paid patient had a recent hospitalization with decompensated heart failure and GI bleed .  patient underwent colonoscopy which showed colonic angiodysplasia which was treated with APC.  Patient was discharged home after hemoglobin was stable .  Xarelto was resumed.  Patient presented back to the hospital with complaints of hematochezia after restarting Xarelto.  Labs showed worsening anemia, elevated INR.  CT abdomen/pelvis with concerns for active GI hemorrhage.  Patient was admitted hospitalist medicine service and GI services consulted.  Patient had recurrent episodes of hematochezia after admission .  GI reviewed images, suspect ulceration at the site of APC from colonoscopy.  Patient underwent repeat colonoscopy again this hospitalization which revealed hemorrhoids and solitary ulcer in the cecum which was injected with APC.  Recommended to restart Xarelto in 3 days after colonoscopy.  Closely monitoring hemoglobin and closely monitoring for overt bleeding.  Hemoglobin more than 8 on 08/24.  No more overt bleeding as of 8/24.     Interval Hx:   Patient seen at bedside.  Comfortably laying in bed.  No more having overt bleeding.  Hemodynamics and respiratory status stable.  Patient's friend is at bedside and he has a naturopathic practitioner.  He had several questions as far as his care, had detailed conversation with the patient and friend at bedside    Objective/physical exam:  General: In no acute distress, afebrile  Chest: Clear to auscultation bilaterally  Heart: S1, S2, no appreciable murmur  Abdomen: Soft, nontender, BS +  MSK: Warm, no lower extremity edema, no clubbing or cyanosis  Neurologic: Alert and oriented x4, moving all  extremities with good strength     VITAL SIGNS: 24 HRS MIN & MAX LAST   Temp  Min: 98 °F (36.7 °C)  Max: 98.8 °F (37.1 °C) 98.3 °F (36.8 °C)   BP  Min: 100/51  Max: 131/64 118/66   Pulse  Min: 66  Max: 79  69   Resp  Min: 12  Max: 17 12   SpO2  Min: 95 %  Max: 99 % 99 %       Recent Labs   Lab 08/25/23  0526   WBC 9.18   RBC 2.72*   HGB 7.8*   HCT 23.2*   MCV 85.3   MCH 28.7   MCHC 33.6   RDW 15.8      MPV 9.5         Recent Labs   Lab 08/25/23  0526      K 4.1   CO2 27   BUN 13.1   CREATININE 1.03   CALCIUM 7.9*   ALBUMIN 2.4*   ALKPHOS 41   ALT 10   AST 13   BILITOT 0.2          Microbiology Results (last 7 days)       ** No results found for the last 168 hours. **             Scheduled Med:   fluticasone furoate-vilanteroL  1 puff Inhalation Daily    furosemide  40 mg Oral Daily    levalbuterol  0.63 mg Nebulization Q8H    LIDOcaine (PF) 10 mg/ml (1%)  1 mL Intradermal Once    metoprolol succinate  75 mg Oral Daily    pantoprazole  40 mg Intravenous Daily    valsartan  40 mg Oral Daily          Assessment/Plan:    Acute/recurrent GI bleed secondary to cecal ulceration status post APC  Recent diagnosis cecal angiodysplasia treated with APC  Hemorrhoids  Acute blood loss anemia secondary to GI bleed-worse today  Chronic systolic heart failure with ejection fraction-35% , appears compensated   Chronic interstitial lung disease   PAF-Xarelto on hold   Chronic tobacco use   Essential HTN-stable   HLD  History of medical noncompliance   Prophylaxis    Colonoscopy this hospitalization revealed hemorrhoids and cecal ulceration which was appropriately treated with APC   Patient is no more having hematochezia  But hemoglobin dropped to 7.8 today   I have ordered 2 units PRBC with IV Lasix 20 mg in between  Xarelto on hold   GI okay with resuming Xarelto 3 days post colonoscopy  However patient hesitant to resume since it has caused GI bleed 2 times   Discussed with Cardiology, plan to hold Xarelto and plan for  "Watchman device placement as outpatient  Continue home meds-bronchodilators   Continue Lasix, Toprol-XL, valsartan  Continue PPI  DVT prophylaxis-no chemical prophylaxis given GI bleed, bilateral SCDs    Naturopathic friend Mr. Byrnes at bedside wants to be involved in his care.  He is requesting [facility] consult so that he can coordinate care with them  He believes his heart failure is secondary to "trauma" and will heal naturally with naturopathic meds  Also believes Xarelto is a poison  I tried to explain the patient's treatment plan of care to his friend, he was not receptive and was unhappy that I did not agree to his beliefs        Kaylin Mohan MD   08/25/2023                "

## 2023-08-26 VITALS
HEART RATE: 66 BPM | SYSTOLIC BLOOD PRESSURE: 122 MMHG | HEIGHT: 70 IN | RESPIRATION RATE: 18 BRPM | DIASTOLIC BLOOD PRESSURE: 76 MMHG | BODY MASS INDEX: 16.89 KG/M2 | OXYGEN SATURATION: 97 % | WEIGHT: 118 LBS | TEMPERATURE: 98 F

## 2023-08-26 PROBLEM — K92.2 GI BLEED: Status: ACTIVE | Noted: 2023-08-26

## 2023-08-26 LAB
ALBUMIN SERPL-MCNC: 2.7 G/DL (ref 3.4–4.8)
ALBUMIN/GLOB SERPL: 1.1 RATIO (ref 1.1–2)
ALP SERPL-CCNC: 44 UNIT/L (ref 40–150)
ALT SERPL-CCNC: 11 UNIT/L (ref 0–55)
AST SERPL-CCNC: 14 UNIT/L (ref 5–34)
BASOPHILS # BLD AUTO: 0.04 X10(3)/MCL
BASOPHILS NFR BLD AUTO: 0.4 %
BILIRUB SERPL-MCNC: 0.7 MG/DL
BUN SERPL-MCNC: 13.1 MG/DL (ref 8.4–25.7)
CALCIUM SERPL-MCNC: 8.3 MG/DL (ref 8.8–10)
CHLORIDE SERPL-SCNC: 106 MMOL/L (ref 98–107)
CO2 SERPL-SCNC: 29 MMOL/L (ref 23–31)
CREAT SERPL-MCNC: 1.29 MG/DL (ref 0.73–1.18)
EOSINOPHIL # BLD AUTO: 0.34 X10(3)/MCL (ref 0–0.9)
EOSINOPHIL NFR BLD AUTO: 3.8 %
ERYTHROCYTE [DISTWIDTH] IN BLOOD BY AUTOMATED COUNT: 15.5 % (ref 11.5–17)
GFR SERPLBLD CREATININE-BSD FMLA CKD-EPI: 54 MLS/MIN/1.73/M2
GLOBULIN SER-MCNC: 2.5 GM/DL (ref 2.4–3.5)
GLUCOSE SERPL-MCNC: 102 MG/DL (ref 82–115)
HCT VFR BLD AUTO: 29.3 % (ref 42–52)
HGB BLD-MCNC: 9.9 G/DL (ref 14–18)
IMM GRANULOCYTES # BLD AUTO: 0.09 X10(3)/MCL (ref 0–0.04)
IMM GRANULOCYTES NFR BLD AUTO: 1 %
LYMPHOCYTES # BLD AUTO: 1.76 X10(3)/MCL (ref 0.6–4.6)
LYMPHOCYTES NFR BLD AUTO: 19.6 %
MCH RBC QN AUTO: 29.1 PG (ref 27–31)
MCHC RBC AUTO-ENTMCNC: 33.8 G/DL (ref 33–36)
MCV RBC AUTO: 86.2 FL (ref 80–94)
MONOCYTES # BLD AUTO: 0.79 X10(3)/MCL (ref 0.1–1.3)
MONOCYTES NFR BLD AUTO: 8.8 %
NEUTROPHILS # BLD AUTO: 5.97 X10(3)/MCL (ref 2.1–9.2)
NEUTROPHILS NFR BLD AUTO: 66.4 %
NRBC BLD AUTO-RTO: 0 %
PLATELET # BLD AUTO: 233 X10(3)/MCL (ref 130–400)
PMV BLD AUTO: 9.6 FL (ref 7.4–10.4)
POTASSIUM SERPL-SCNC: 4.3 MMOL/L (ref 3.5–5.1)
PROT SERPL-MCNC: 5.2 GM/DL (ref 5.8–7.6)
RBC # BLD AUTO: 3.4 X10(6)/MCL (ref 4.7–6.1)
SODIUM SERPL-SCNC: 140 MMOL/L (ref 136–145)
WBC # SPEC AUTO: 8.99 X10(3)/MCL (ref 4.5–11.5)

## 2023-08-26 PROCEDURE — 25000242 PHARM REV CODE 250 ALT 637 W/ HCPCS: Performed by: HOSPITALIST

## 2023-08-26 PROCEDURE — 25000003 PHARM REV CODE 250: Performed by: HOSPITALIST

## 2023-08-26 PROCEDURE — 94640 AIRWAY INHALATION TREATMENT: CPT

## 2023-08-26 PROCEDURE — C9113 INJ PANTOPRAZOLE SODIUM, VIA: HCPCS | Performed by: HOSPITALIST

## 2023-08-26 PROCEDURE — 80053 COMPREHEN METABOLIC PANEL: CPT | Performed by: INTERNAL MEDICINE

## 2023-08-26 PROCEDURE — 25000003 PHARM REV CODE 250: Performed by: INTERNAL MEDICINE

## 2023-08-26 PROCEDURE — 63600175 PHARM REV CODE 636 W HCPCS: Performed by: HOSPITALIST

## 2023-08-26 PROCEDURE — 99900035 HC TECH TIME PER 15 MIN (STAT)

## 2023-08-26 PROCEDURE — 99900031 HC PATIENT EDUCATION (STAT)

## 2023-08-26 PROCEDURE — 85025 COMPLETE CBC W/AUTO DIFF WBC: CPT | Performed by: INTERNAL MEDICINE

## 2023-08-26 PROCEDURE — 25000242 PHARM REV CODE 250 ALT 637 W/ HCPCS: Performed by: STUDENT IN AN ORGANIZED HEALTH CARE EDUCATION/TRAINING PROGRAM

## 2023-08-26 PROCEDURE — 94761 N-INVAS EAR/PLS OXIMETRY MLT: CPT

## 2023-08-26 RX ORDER — FUROSEMIDE 40 MG/1
20 TABLET ORAL DAILY
Qty: 15 TABLET | Refills: 2
Start: 2023-08-26 | End: 2024-03-07

## 2023-08-26 RX ADMIN — FUROSEMIDE 40 MG: 40 TABLET ORAL at 10:08

## 2023-08-26 RX ADMIN — LEVALBUTEROL HYDROCHLORIDE 0.63 MG: 0.63 SOLUTION RESPIRATORY (INHALATION) at 12:08

## 2023-08-26 RX ADMIN — METOPROLOL SUCCINATE 75 MG: 50 TABLET, EXTENDED RELEASE ORAL at 10:08

## 2023-08-26 RX ADMIN — FLUTICASONE FUROATE AND VILANTEROL TRIFENATATE 1 PUFF: 100; 25 POWDER RESPIRATORY (INHALATION) at 10:08

## 2023-08-26 RX ADMIN — PANTOPRAZOLE SODIUM 40 MG: 40 INJECTION, POWDER, LYOPHILIZED, FOR SOLUTION INTRAVENOUS at 10:08

## 2023-08-26 RX ADMIN — VALSARTAN 40 MG: 40 TABLET, FILM COATED ORAL at 10:08

## 2023-08-26 RX ADMIN — LEVALBUTEROL HYDROCHLORIDE 0.63 MG: 0.63 SOLUTION RESPIRATORY (INHALATION) at 07:08

## 2023-08-26 NOTE — DISCHARGE SUMMARY
[facility] Medicine Discharge Summary    Admit Date: 8/21/2023  Discharge Date and Time: 8/26/20238:43 AM  Admitting Physician:  Team  Discharging Physician: Kaylin Mohan MD.  Primary Care Physician: [facility]      Discharge Diagnoses:  Acute/recurrent GI bleed secondary to cecal ulceration status post APC  Recent diagnosis cecal angiodysplasia treated with APC  Hemorrhoids  Acute blood loss anemia secondary to GI bleed-worse today  Chronic systolic heart failure with ejection fraction-35% , appears compensated   Chronic interstitial lung disease   PAF-Xarelto on hold   Chronic tobacco use   Essential HTN-stable   HLD  History of medical noncompliance     Hospital Course:   88-year-old male with history of systolic heart failure with ejection fraction 35%, PAF on Xarelto, chronic tobacco use, HTN, HLD, medical noncompliance paid patient had a recent hospitalization with decompensated heart failure and GI bleed .  patient underwent colonoscopy which showed colonic angiodysplasia which was treated with APC.  Patient was discharged home after hemoglobin was stable .  Xarelto was resumed.  Patient presented back to the hospital with complaints of hematochezia after restarting Xarelto.  Labs showed worsening anemia, elevated INR.  CT abdomen/pelvis with concerns for active GI hemorrhage.  Patient was admitted hospitalist medicine service and GI services consulted.  Patient had recurrent episodes of hematochezia after admission .  GI reviewed images, suspect ulceration at the site of APC from colonoscopy.  Patient underwent repeat colonoscopy again this hospitalization which revealed hemorrhoids and solitary ulcer in the cecum which was injected with APC.  Recommended to restart Xarelto in 3 days after colonoscopy.  Closely monitoring hemoglobin and closely monitoring for overt bleeding.  Hemoglobin more than 8 on 08/24.  No more overt bleeding as of 8/24.  Hemoglobin  dropped to 7.8 on 8/25, 2 units PRBC transfusion with IV Lasix in between ordered, GI recommended to resume Xarelto 3 days post colonoscopy, however patient hesitant to resume since it has caused GI bleed twice, discussed with Cardiology and plan for Watchman device placement as outpatient, cardiology okay with holding anticoagulation.  No more overt bleeding.  Patient re-evaluated 8/26.  No more overt bleeding and hemoglobin appropriately improved.  Hemodynamics and respiratory status stable.  Cleared by all specialists for DC.  Patient was discharged in stable condition .Xarelto held per Cardiology recommendation.  Patient will follow-up with GI, Cardiology as outpatient.  Cardiology plan to perform Watchman device placement as outpatient.  Treatment plans discussed with the patient and he voiced understanding to everything explained.  Discharge medications per med rec.  Follow-up with PCP, GI, Cardiology.    Vitals:  VITAL SIGNS: 24 HRS MIN & MAX LAST   Temp  Min: 97.4 °F (36.3 °C)  Max: 98.4 °F (36.9 °C) 97.7 °F (36.5 °C)   BP  Min: 110/67  Max: 135/66 110/67   Pulse  Min: 52  Max: 88  64   Resp  Min: 16  Max: 20 18   SpO2  Min: 92 %  Max: 99 % 97 %       Physical Exam:  General appearance:  No acute distress  HENT: Atraumatic   Lungs: Clear to auscultation bilaterally.   Heart: RRR,No edema  Abdomen: Soft, non tender   Extremities: warm  Neuro:  Awake, alert, oriented x4  Psych/mental status: Appropriate mood and affect.      Procedures Performed: No admission procedures for hospital encounter.     Significant Diagnostic Studies: See Full reports for all details    Recent Labs   Lab 08/24/23 0543 08/25/23 0526 08/26/23  0522   WBC 9.59 9.18 8.99   RBC 2.90* 2.72* 3.40*   HGB 8.3* 7.8* 9.9*   HCT 24.0* 23.2* 29.3*   MCV 82.8 85.3 86.2   MCH 28.6 28.7 29.1   MCHC 34.6 33.6 33.8   RDW 15.5 15.8 15.5    224 233   MPV 9.3 9.5 9.6       Recent Labs   Lab 08/24/23  0543 08/25/23  0526 08/26/23  0522     140 140   K 3.9 4.1 4.3   CO2 25 27 29   BUN 8.5 13.1 13.1   CREATININE 0.94 1.03 1.29*   CALCIUM 7.9* 7.9* 8.3*   ALBUMIN 2.5* 2.4* 2.7*   ALKPHOS 42 41 44   ALT 10 10 11   AST 13 13 14   BILITOT 0.8 0.2 0.7        Microbiology Results (last 7 days)       ** No results found for the last 168 hours. **             CT Abdomen Pelvis W Wo Contrast  Narrative: EXAMINATION:  CT ABDOMEN PELVIS W WO CONTRAST    CLINICAL HISTORY:  GI bleed;    TECHNIQUE:  Low dose axial images, sagittal and coronal reformations were obtained from the lung bases to the pubic symphysis before and following the IV administration of 100 mL of Omnipaque 350.  .  Oral contrast not given.  DLP 1242.  Automated exposure control used.    COMPARISON:  None    FINDINGS:  Delayed phase images demonstrate a focus of blushing within the cecum consistent with active hemorrhage.  No inflammatory changes or bowel dilation evident.  No lymphadenopathy.    Liver normal enhancement with no focal lesion.    Gallbladder and biliary ductal system normal.    Pancreas, stomach, adrenal glands normal.  Numerous splenic calcifications.    Kidneys normal enhancement with no hydronephrosis.    Aorta tapers normally.  Severe atherosclerotic calcification.    Bladder and rectum normal.    Bones intact.    Mild left pleural fluid, moderately prominent peripheral distribution chronic appearing interstitial changes with small areas of fibrosis.  Impression: Small globular area intraluminal contrast within the cecum consistent with active hemorrhage.    Agree with jayleen.    Electronically signed by: Sandy Thomson MD  Date:    08/22/2023  Time:    08:12         Medication List        CONTINUE taking these medications      aspirin 81 MG Chew  Take 1 tablet (81 mg total) by mouth once daily.     fluticasone furoate-vilanteroL 100-25 mcg/dose diskus inhaler  Commonly known as: BREO  Inhale 1 puff into the lungs once daily. Controller     furosemide 40 MG tablet  Commonly  known as: LASIX  Take 1 tablet (40 mg total) by mouth once daily.     hawthorn 500 mg Cap     levalbuterol 0.63 mg/3 mL nebulizer solution  Commonly known as: XOPENEX  Take 3 mLs (0.63 mg total) by nebulization every 8 (eight) hours. Rescue     metoprolol succinate 25 MG 24 hr tablet  Commonly known as: TOPROL-XL  Take 3 tablets (75 mg total) by mouth once daily.     pantoprazole 40 MG tablet  Commonly known as: PROTONIX  Take 1 tablet (40 mg total) by mouth 2 (two) times daily before meals.     sucralfate 1 gram tablet  Commonly known as: CARAFATE  Take 1 tablet (1 g total) by mouth 4 (four) times daily before meals and nightly.     valsartan 40 MG tablet  Commonly known as: DIOVAN  Take 1 tablet (40 mg total) by mouth once daily.            STOP taking these medications      guaiFENesin 100 mg/5 ml 100 mg/5 mL syrup  Commonly known as: ROBITUSSIN     rivaroxaban 20 mg Tab  Commonly known as: XARELTO               Explained in detail to the patient about the discharge plan, medications, and follow-up visits. Pt understands and agrees with the treatment plan  Discharge Disposition: Home-Health Care [facility]   Discharged Condition: stable  Diet-   Dietary Orders (From admission, onward)       Start     Ordered    08/24/23 0949  Diet low fiber/residue  Diet effective now         08/24/23 0948    08/24/23 0948  Dietary nutrition supplements Boost Chocolate; Daily  Continuous        Question Answer Comment   Select PO Supplement: Boost Chocolate    Frequency: Daily        08/24/23 0947                   Medications Per DC med rec  Activities as tolerated   Follow-up Information       Yvonne Carlisle MD Follow up on 9/5/2023.    Specialties: Cardiovascular Disease, Cardiology  Why: @ 10:00AM  Contact information:  [facility] [address]  [phone]               Yvonne Carlisle MD Follow up on 8/28/2023.    Specialties: Cardiovascular Disease, Cardiology  Why: @ 2:00PM for Rachael PET  Contact  information:  [address]  [place] [address]  [phone]               [facility] Follow up in 1 week(s).    Contact information:  [address]  [place] [address]  [phone]               [facility] in 2 days with PCP Follow up.                           For further questions contact hospitalist office    Discharge time 33 minutes    For worsening symptoms, chest pain, shortness of breath, increased abdominal pain, high grade fever, stroke or stroke like symptoms, immediately go to the nearest Emergency Room or call 911 as soon as possible.      Kaylin Banda M.D on 8/26/2023. at 8:43 AM.

## 2023-09-12 ENCOUNTER — LAB REQUISITION (OUTPATIENT)
Dept: LAB | Facility: HOSPITAL | Age: 88
End: 2023-09-12
Payer: OTHER GOVERNMENT

## 2023-09-12 DIAGNOSIS — E11.9 TYPE 2 DIABETES MELLITUS WITHOUT COMPLICATIONS: ICD-10-CM

## 2023-09-12 DIAGNOSIS — I11.0 HYPERTENSIVE HEART DISEASE WITH HEART FAILURE: ICD-10-CM

## 2023-09-12 DIAGNOSIS — I50.23 ACUTE ON CHRONIC SYSTOLIC (CONGESTIVE) HEART FAILURE: ICD-10-CM

## 2023-09-12 LAB
ANION GAP SERPL CALC-SCNC: 12 MEQ/L
BUN SERPL-MCNC: 15 MG/DL (ref 8.4–25.7)
CALCIUM SERPL-MCNC: 9.4 MG/DL (ref 8.8–10)
CHLORIDE SERPL-SCNC: 104 MMOL/L (ref 98–107)
CO2 SERPL-SCNC: 26 MMOL/L (ref 23–31)
CREAT SERPL-MCNC: 1.32 MG/DL (ref 0.73–1.18)
CREAT/UREA NIT SERPL: 11
GFR SERPLBLD CREATININE-BSD FMLA CKD-EPI: 53 MLS/MIN/1.73/M2
GLUCOSE SERPL-MCNC: 168 MG/DL (ref 82–115)
POTASSIUM SERPL-SCNC: 4 MMOL/L (ref 3.5–5.1)
SODIUM SERPL-SCNC: 142 MMOL/L (ref 136–145)

## 2023-09-12 PROCEDURE — 80048 BASIC METABOLIC PNL TOTAL CA: CPT | Performed by: STUDENT IN AN ORGANIZED HEALTH CARE EDUCATION/TRAINING PROGRAM

## 2023-11-27 PROBLEM — K92.2 GI BLEED: Status: RESOLVED | Noted: 2023-08-26 | Resolved: 2023-11-27

## 2024-01-20 ENCOUNTER — HOSPITAL ENCOUNTER (EMERGENCY)
Facility: HOSPITAL | Age: 89
Discharge: HOME | End: 2024-01-20
Attending: EMERGENCY MEDICINE
Payer: OTHER GOVERNMENT

## 2024-01-20 VITALS
HEART RATE: 72 BPM | DIASTOLIC BLOOD PRESSURE: 66 MMHG | RESPIRATION RATE: 20 BRPM | TEMPERATURE: 98 F | OXYGEN SATURATION: 93 % | SYSTOLIC BLOOD PRESSURE: 130 MMHG

## 2024-01-20 DIAGNOSIS — S32.501A CLOSED NONDISPLACED FRACTURE OF RIGHT PUBIS, INITIAL ENCOUNTER: ICD-10-CM
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
## 2024-01-20 PROCEDURE — 25000003 PHARM REV CODE 250: Performed by: PHYSICIAN ASSISTANT

## 2024-01-20 PROCEDURE — 63600175 PHARM REV CODE 636 W HCPCS: Performed by: PHYSICIAN ASSISTANT

## 2024-01-20 PROCEDURE — 96372 THER/PROPH/DIAG INJ SC/IM: CPT | Performed by: PHYSICIAN ASSISTANT

## 2024-01-20 PROCEDURE — 99285 EMERGENCY DEPT VISIT HI MDM: CPT | Mod: 25

## 2024-01-20 RX ORDER — HYDROCODONE BITARTRATE AND ACETAMINOPHEN 10; 325 MG/1; MG/1
1 TABLET ORAL EVERY 6 HOURS PRN
Qty: 12 TABLET | Refills: 0 | Status: SHIPPED | OUTPATIENT
Start: 2024-01-20

## 2024-01-20 RX ORDER — HYDROCODONE BITARTRATE AND ACETAMINOPHEN 10; 325 MG/1; MG/1
1 TABLET ORAL
Status: DISCONTINUED | OUTPATIENT
Start: 2024-01-20 | End: 2024-01-20 | Stop reason: HOSPADM

## 2024-01-20 RX ORDER — HYDROCODONE BITARTRATE AND ACETAMINOPHEN 10; 325 MG/1; MG/1
1 TABLET ORAL
Status: COMPLETED | OUTPATIENT
Start: 2024-01-20 | End: 2024-01-20

## 2024-01-20 RX ORDER — KETOROLAC TROMETHAMINE 30 MG/ML
30 INJECTION, SOLUTION INTRAMUSCULAR; INTRAVENOUS
Status: COMPLETED | OUTPATIENT
Start: 2024-01-20 | End: 2024-01-20

## 2024-01-20 RX ADMIN — KETOROLAC TROMETHAMINE 30 MG: 30 INJECTION, SOLUTION INTRAMUSCULAR; INTRAVENOUS at 08:01

## 2024-01-20 RX ADMIN — HYDROCODONE BITARTRATE AND ACETAMINOPHEN 1 TABLET: 10; 325 TABLET ORAL at 08:01

## 2024-01-20 NOTE — FIRST PROVIDER EVALUATION
Medical screening examination initiated.  I have conducted a focused provider triage encounter, findings are as follows:    Brief history of present illness:  88y/o M presents to the ED after slipping on ice six days ago. States pain to left shoulder,hip and back. Denies hitting head.     There were no vitals filed for this visit.    Pertinent physical exam:  AAA x 3    Brief workup plan:  Imaging     Preliminary workup initiated; this workup will be continued and followed by the physician or advanced practice provider that is assigned to the patient when roomed.

## 2024-01-21 NOTE — ED NOTES
Patient back from CT et resting on stretcher. Report received that patient refused Norco tablet on dayshift. RR nonlabored, NAD.

## 2024-01-21 NOTE — ED PROVIDER NOTES
Encounter Date: 1/20/2024       History     Chief Complaint   Patient presents with    Fall     Pt. C/o fall and worsening left shoulder, left hip,  and back pain started 6 days ago.. reports hx of back issues in the past... pt. Awake and alert upon arrival..      HPI  Review of patient's allergies indicates:   Allergen Reactions    Colestipol     Meloxicam     Rosuvastatin     Simvastatin     Statins-hmg-coa reductase inhibitors     Tramadol      Past Medical History:   Diagnosis Date    A-fib     CHF (congestive heart failure)     Chronic pain     Hypercholesterolemia     Stroke      Past Surgical History:   Procedure Laterality Date    COLONOSCOPY N/A 8/16/2023    Procedure: COLON;  Surgeon: Jennifer Santillan III, MD;  Location: [facility] ENDOSCOPY;  Service: Gastroenterology;  Laterality: N/A;    COLONOSCOPY N/A 8/16/2023    Procedure: COLONOSCOPY, WITH HEMORRHAGE CONTROL;  Surgeon: Jennifer Santillan III, MD;  Location: [facility] ENDOSCOPY;  Service: Gastroenterology;  Laterality: N/A;    COLONOSCOPY N/A 8/23/2023    Procedure: COLON;  Surgeon: Ton Minor MD;  Location: [facility] ENDOSCOPY;  Service: Gastroenterology;  Laterality: N/A;    COLONOSCOPY  8/23/2023    Procedure: COLONOSCOPY, WITH HEMORRHAGE CONTROL;  Surgeon: Ton Minor MD;  Location: [facility] ENDOSCOPY;  Service: Gastroenterology;;  4 cc epi    COLONOSCOPY, WITH POLYPECTOMY USING SNARE N/A 8/16/2023    Procedure: COLONOSCOPY, WITH POLYPECTOMY USING SNARE;  Surgeon: Jennifer Santillan III, MD;  Location: [facility] ENDOSCOPY;  Service: Gastroenterology;  Laterality: N/A;    FRACTURE SURGERY      TIBIA FRACTURE SURGERY Right      Family History   Problem Relation Age of Onset    Hypertension Mother     Heart disease Mother     Hyperlipidemia Mother     Cancer Mother     COPD Mother     Dementia Father      Social History     Tobacco Use    Smoking status: Never    Smokeless tobacco: Never   Substance Use Topics    Alcohol use: Yes     Comment:  Has cut down from 6 to a couple of beer now    Drug use: Never     Review of Systems    Physical Exam     Initial Vitals [01/20/24 1329]   BP Pulse Resp Temp SpO2   (!) 148/82 95 20 97.9 °F (36.6 °C) 100 %      MAP       --         Physical Exam    ED Course   Procedures  Labs Reviewed - No data to display       Imaging Results              CT Pelvis Without Contrast (Final result)  Result time 01/20/24 20:05:05      Final result by Oniel Jacques MD (01/20/24 20:05:05)                   Impression:      Unchanged nondisplaced fracture of the pubic symphysis with minimal extension into the superior and inferior pubic rami on the left.      Electronically signed by: Oniel Jacques MD  Date:    01/20/2024  Time:    20:05               Narrative:    EXAMINATION:  CT PELVIS WITHOUT CONTRAST    CLINICAL HISTORY:  fx;    TECHNIQUE:  Axial images of the pelvis were obtained without IV contrast administration.  Coronal and sagittal reconstructions were provided.  Three dimensional and MIP images were obtained and evaluated.  Total DLP was 274 mGy-cm. Dose lowering technique and automated exposure control were utilized for this exam.    COMPARISON:  CT of the left hip 01/20/2024.    FINDINGS:  The nondisplaced fracture of the pubic symphysis with extension into the superior and inferior pubic rami is unchanged.  Femoroacetabular alignment is normal.  There is mild degenerative change of the bilateral hips.    The visualized small bowel is decompressed.  There is colonic diverticulosis.  There is no pelvic adenopathy.                                       CT Cervical Spine Without Contrast (Final result)  Result time 01/20/24 16:12:05      Final result by Oniel Jacques MD (01/20/24 16:12:05)                   Impression:      Multilevel degenerative change with out posttraumatic abnormality of the cervical spine.      Electronically signed by: Oniel Jacques MD  Date:    01/20/2024  Time:    16:12               Narrative:     EXAMINATION:  CT CERVICAL SPINE WITHOUT CONTRAST    CLINICAL HISTORY:  Cervical radiculopathy, no red flags;    TECHNIQUE:  Axial images of the cervical spine were obtained without IV contrast administration.  Coronal and sagittal reconstructions were provided.  Three dimensional and MIP images were obtained and evaluated.  Total DLP was 387 mGy-cm. Dose lowering technique and automated exposure control were utilized for this exam.    COMPARISON:  MRI of the cervical spine 07/23/2014.    FINDINGS:  There is normal sagittal alignment.  There is no spondylolisthesis.  There is no loss of vertebral body height.  There is multilevel degenerative disc space narrowing most notable at C5-C6 and C6-C7.  There is multilevel uncovertebral osteophytosis.  The included portions of the posterior fossa are normal.  The craniocervical junction is symmetric.  The atlantoaxial articulation is normal.  There is diffuse degenerative facet arthropathy.  The posterior elements are intact.  There is no fracture.    The airway is patent.  There is no cervical lymphadenopathy.  The lung apices are clear.                                       CT Hip Without Contrast Left (Final result)  Result time 01/20/24 16:10:35      Final result by Oniel Jacques MD (01/20/24 16:10:35)                   Impression:      Nondisplaced fracture of the pubic symphysis with minimal extension into the superior inferior pubic rami.      Electronically signed by: Oniel Jacques MD  Date:    01/20/2024  Time:    16:10               Narrative:    EXAMINATION:  CT HIP WITHOUT CONTRAST LEFT    CLINICAL HISTORY:  Hip pain, stress fracture suspected, neg xray;left hip pain. Unable to lift leg or bare weight.;    TECHNIQUE:  Axial images of the left hip were obtained without IV contrast administration.  Coronal and sagittal reconstructions were provided.  Three dimensional and MIP images were obtained and evaluated.  Total DLP was 276 mGy-cm. Dose lowering technique and  automated exposure control were utilized for this exam.    COMPARISON:  Pelvis radiograph 1/20/2024.    FINDINGS:  Femoroacetabular alignment is normal.  There is mild degenerative change of the left hip.  There is no left hip fracture.  There is a nondisplaced fracture of the pubic symphysis which extends minimally into the superior and inferior pubic rami.  There is no bony mass lesion.    The visualized urinary bladder is normal.  There is sigmoid diverticulosis.  There is no pelvic or inguinal adenopathy.                                       X-Ray Lumbar Spine Ap And Lateral (Final result)  Result time 01/20/24 14:16:03      Final result by Oniel Jacques MD (01/20/24 14:16:03)                   Impression:      Grade 1 anterolisthesis of L4 on L5 with lower lumbar degenerative facet arthropathy.      Electronically signed by: Oniel Jacques MD  Date:    01/20/2024  Time:    14:16               Narrative:    EXAMINATION:  Three radiographic views of the LUMBAR SPINE.    CLINICAL HISTORY:  fall;    TECHNIQUE:  Three radiographic views of the LUMBAR SPINE.    COMPARISON:  None.    FINDINGS:  Three views of the lumbar spine demonstrate 5 non-rib-bearing lumbar vertebral bodies.  There is 5 mm of anterolisthesis of L4 on L5.  There is no loss of vertebral body or disc space height. There is lower lumbar degenerative facet arthropathy.                                       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      Electronically signed by: Oniel Jacques MD  Date:    01/20/2024  Time:    14:15               Narrative:    EXAMINATION:  Three radiographic views of the LEFT HIP.    CLINICAL HISTORY:  fall;    TECHNIQUE:  Three radiographic views of the LEFT  HIP.    COMPARISON:  None.    FINDINGS:  AP view of the pelvis with AP and lateral views of the left hip demonstrate normal alignment.  There is femoroacetabular osteoarthritis.  There is no fracture.  There is no bony mass lesion.  There is no soft tissue swelling.                                       X-Ray Shoulder 2 or More Views Left (Final result)  Result time 01/20/24 14:14:32      Final result by Oniel Jacques MD (01/20/24 14:14:32)                   Impression:      Glenohumeral and acromioclavicular osteoarthritis with findings suggesting rotator cuff insufficiency.      Electronically signed by: Oniel Jacques MD  Date:    01/20/2024  Time:    14:14               Narrative:    EXAMINATION:  Three radiographic views of the LEFT SHOULDER.    CLINICAL HISTORY:  fall;    TECHNIQUE:  Three radiographic views of the LEFT SHOULDER.    COMPARISON:  Left shoulder radiograph 07/02/2021.    FINDINGS:  There is glenohumeral and acromioclavicular osteoarthritis.  There is no fracture.  There is superior migration of the humeral head with respect to the glenoid.  There is no soft tissue swelling.  The included left lung is clear.                                       Medications   HYDROcodone-acetaminophen  mg per tablet 1 tablet (1 tablet Oral Incomplete 1/20/24 1629)   HYDROcodone-acetaminophen  mg per tablet 1 tablet (has no administration in time range)   ketorolac injection 30 mg (has no administration in time range)     Medical Decision Making  Amount and/or Complexity of Data Reviewed  Radiology:  Decision-making details documented in ED Course.    Risk  Prescription drug management.               ED Course as of 01/20/24 2030   Sat Jan 20, 2024   1920 X-Ray Lumbar Spine Ap And Lateral [YG]      ED Course User Index  [YG] Sandy Moreno PA                           Clinical Impression:  Final diagnoses:  [W19.XXXA] Fall, initial encounter (Primary)  [S32.501A] Closed nondisplaced fracture of right  pubis, initial encounter          ED Disposition Condition    Discharge Stable          ED Prescriptions    None       Follow-up Information       Follow up With Specialties Details Why Contact Info    [facility] - Emergency Dept Emergency Medicine  If symptoms worsen [address] [facility] [id number] [phone]    [facility] [place]  In 3 days  [address]  [facility] [address]  [phone]               Sandy Moreno PA  01/21/24 0023

## 2024-03-06 ENCOUNTER — ANESTHESIA EVENT (OUTPATIENT)
Dept: ENDOSCOPY | Facility: HOSPITAL | Age: 89
End: 2024-03-06
Payer: OTHER GOVERNMENT

## 2024-03-06 RX ORDER — SODIUM CHLORIDE, SODIUM GLUCONATE, SODIUM ACETATE, POTASSIUM CHLORIDE AND MAGNESIUM CHLORIDE 30; 37; 368; 526; 502 MG/100ML; MG/100ML; MG/100ML; MG/100ML; MG/100ML
INJECTION, SOLUTION INTRAVENOUS CONTINUOUS
Status: CANCELLED | OUTPATIENT
Start: 2024-03-06 | End: 2024-04-05

## 2024-03-06 NOTE — ANESTHESIA PREPROCEDURE EVALUATION
03/06/2024  Lizandro Martinez is a 89 y.o., male.    Pre-operative evaluation for Procedure(s) (LRB):  COLON (N/A)    Lizandro Martinez is a 89 y.o. male had multiple colonoscopies. Last 8/23/2023 for bleed had Hx PRBCcs/2 U FFP. Was on Xarelto.  PMHx: HTN. HLD, EF=35% (8/20230. DAILY etoh, gib W bt REQ. Ckd. ANEMIA. CHR l SYST HEART FAILURE. afib              Patient Active Problem List   Diagnosis    Atrial fibrillation       Review of patient's allergies indicates:   Allergen Reactions    Colestipol     Meloxicam     Rosuvastatin     Simvastatin     Statins-hmg-coa reductase inhibitors     Tramadol        No current facility-administered medications on file prior to encounter.     Current Outpatient Medications on File Prior to Encounter   Medication Sig Dispense Refill    aspirin 81 MG Chew Take 1 tablet (81 mg total) by mouth once daily. 30 tablet 0    fluticasone furoate-vilanteroL (BREO) 100-25 mcg/dose diskus inhaler Inhale 1 puff into the lungs once daily. Controller 30 each 0    furosemide (LASIX) 40 MG tablet Take 0.5 tablets (20 mg total) by mouth once daily. 15 tablet 2    hawthorn 500 mg Cap Take 1 capsule by mouth.      HYDROcodone-acetaminophen (NORCO)  mg per tablet Take 1 tablet by mouth every 6 (six) hours as needed for Pain. 12 tablet 0    levalbuterol (XOPENEX) 0.63 mg/3 mL nebulizer solution Take 3 mLs (0.63 mg total) by nebulization every 8 (eight) hours. Rescue 270 mL 0    metoprolol succinate (TOPROL-XL) 25 MG 24 hr tablet Take 3 tablets (75 mg total) by mouth once daily. 90 tablet 2    pantoprazole (PROTONIX) 40 MG tablet Take 1 tablet (40 mg total) by mouth 2 (two) times daily before meals. 60 tablet 0    valsartan (DIOVAN) 40 MG tablet Take 1 tablet (40 mg total) by mouth once daily. 90 tablet 3       Past Surgical History:   Procedure Laterality Date    COLONOSCOPY N/A  "8/16/2023    Procedure: COLON;  Surgeon: Jennifer Santillan III, MD;  Location: [facility] ENDOSCOPY;  Service: Gastroenterology;  Laterality: N/A;    COLONOSCOPY N/A 8/16/2023    Procedure: COLONOSCOPY, WITH HEMORRHAGE CONTROL;  Surgeon: Jennifer Santillan III, MD;  Location: [facility] ENDOSCOPY;  Service: Gastroenterology;  Laterality: N/A;    COLONOSCOPY N/A 8/23/2023    Procedure: COLON;  Surgeon: Ton Minor MD;  Location: [facility] ENDOSCOPY;  Service: Gastroenterology;  Laterality: N/A;    COLONOSCOPY  8/23/2023    Procedure: COLONOSCOPY, WITH HEMORRHAGE CONTROL;  Surgeon: Ton Minor MD;  Location: [facility] ENDOSCOPY;  Service: Gastroenterology;;  4 cc epi    COLONOSCOPY, WITH POLYPECTOMY USING SNARE N/A 8/16/2023    Procedure: COLONOSCOPY, WITH POLYPECTOMY USING SNARE;  Surgeon: Jennifer Santillan III, MD;  Location: [facility] ENDOSCOPY;  Service: Gastroenterology;  Laterality: N/A;    FRACTURE SURGERY      TIBIA FRACTURE SURGERY Right      CBC: No results for input(s): "WBC", "RBC", "HGB", "HCT", "PLT", "MCV", "MCH", "MCHC" in the last 72 hours.    CMP: No results for input(s): "NA", "K", "CL", "CO2", "BUN", "CREATININE", "GLU", "MG", "PHOS", "CALCIUM", "ALBUMIN", "PROT", "ALKPHOS", "ALT", "AST", "BILITOT" in the last 72 hours.    INR  No results for input(s): "PT", "INR", "PROTIME", "APTT" in the last 72 hours.        Diagnostic Studies:  CXR :    EKG 11/13/2023: AFIB w slow VR=58. RBBB. LVH and STTchange. Poss septal infarct, age?.     8/11/2023: Afib=88. RBBB. Ant Infarct, age?      2D Echo :  No results found for this or any previous visit.        Pulmo Notes 9.2023:              Pre-op Assessment    I have reviewed the Patient Summary Reports.    I have reviewed the NPO Status.   I have reviewed the Medications.     Review of Systems  Anesthesia Hx:  No problems with previous Anesthesia   History of prior surgery of interest to airway management or planning:          Denies Family Hx of Anesthesia " complications.    Denies Personal Hx of Anesthesia complications.                    Social:  Alcohol Use, Former Smoker 40-pack years. Quit:   2-6 beers. Daily ETOH.      Hematology/Oncology:       -- Anemia:                                  EENT/Dental:   Eyes: Visual Impairment   Has S/P Extraction - Bilateral Catarract           Ears General/Symptom(s) Hearing Impairment: hearing-aid right, hearing-aid left, deaf- right, deaf - left            Cardiovascular:  Exercise tolerance: good       Dysrhythmias atrial fibrillation  Denies Angina. CHF  Denies Orthopnea.  Denies PND.  hyperlipidemia  Denies ACE. NYHA Classification II  Scattered coronary and aortic calcification. PAfib on Xarelto (8/2023 took himself off)  and Metoprolol. EF=35%. Chr L Heart Failure. EF=35% (8/2023).  Nuclear PERFu: 8/28/2023: N Perfu. rEF=54%. sEF=56%. No ischemia. Low cor event risk.  Echo 8/12/2023: EF=35%. Sever LADilated. RA mod dliated.Mild AI/MR/TR/PI.  Functional Capacity 2 METS      Congestive Heart Failure (CHF)                  Atrial Fibrillation, controlled ventricular response  Disorder of Cardiac Conduction, Intraventricular Conduct Defect, Right Bundle Branch Block(RBBB)    Pulmonary:         Denies Sleep Apnea. Pulmo Notes 9/2023: PFT in chart.  Stiolto, Breo, Xopenex. ( DOES NOT USE any inhalers.  FEV1=29%, FEV1/FVC=33%.               Renal/:  Chronic Renal Disease, CKD                Hepatic/GI:        dAILY etoh.          Musculoskeletal:     Multilevel degen change with out posttraumatic abnormality of the cervical spine judith C5-7.Grade 1 anterolisthesis of L4 on L5 with lower lumbar degenerative facet arthropathy.       Spine Disorders: cervical and lumbar Degenerative disease           Neurological:  Denies TIA.  Denies CVA.                       CVA - Cerebrovasular Accident                 Psych:  Psychiatric Normal                    Physical Exam  General: Alert, Oriented and Well nourished  Uncooperative.  Refuses to answer. Difficult Px.  Airway:  Mallampati: III / II  Mouth Opening: Normal  TM Distance: > 6 cm  Tongue: Normal  Neck ROM: Normal ROM    Dental:  Dentures, Edentulous        Anesthesia Plan  Type of Anesthesia, risks & benefits discussed:    Anesthesia Type: Gen Natural Airway  Intra-op Monitoring Plan: Standard ASA Monitors  Induction:  IV  Informed Consent: Informed consent signed with the Patient and all parties understand the risks and agree with anesthesia plan.  All questions answered. Patient consented to blood products? Yes  ASA Score: 4  Day of Surgery Review of History & Physical: H&P Update referred to the surgeon/provider.    Ready For Surgery From Anesthesia Perspective.     .

## 2024-03-07 ENCOUNTER — HOSPITAL ENCOUNTER (OUTPATIENT)
Facility: HOSPITAL | Age: 89
Discharge: HOME | End: 2024-03-07
Attending: INTERNAL MEDICINE | Admitting: INTERNAL MEDICINE
Payer: OTHER GOVERNMENT

## 2024-03-07 ENCOUNTER — ANESTHESIA (OUTPATIENT)
Dept: ENDOSCOPY | Facility: HOSPITAL | Age: 89
End: 2024-03-07
Payer: OTHER GOVERNMENT

## 2024-03-07 DIAGNOSIS — Z86.010 PERSONAL HISTORY OF COLONIC POLYPS: ICD-10-CM

## 2024-03-07 PROCEDURE — 63600175 PHARM REV CODE 636 W HCPCS: Performed by: NURSE ANESTHETIST, CERTIFIED REGISTERED

## 2024-03-07 PROCEDURE — 45381 COLONOSCOPY SUBMUCOUS NJX: CPT | Mod: PT | Performed by: INTERNAL MEDICINE

## 2024-03-07 PROCEDURE — D9220A PRA ANESTHESIA: Mod: 33,CRNA,, | Performed by: NURSE ANESTHETIST, CERTIFIED REGISTERED

## 2024-03-07 PROCEDURE — D9220A PRA ANESTHESIA: Mod: 33,ANES,, | Performed by: ANESTHESIOLOGY

## 2024-03-07 PROCEDURE — 25000003 PHARM REV CODE 250: Performed by: NURSE ANESTHETIST, CERTIFIED REGISTERED

## 2024-03-07 PROCEDURE — 27201423 OPTIME MED/SURG SUP & DEVICES STERILE SUPPLY: Performed by: INTERNAL MEDICINE

## 2024-03-07 PROCEDURE — 45385 COLONOSCOPY W/LESION REMOVAL: CPT | Mod: PT | Performed by: INTERNAL MEDICINE

## 2024-03-07 PROCEDURE — 37000009 HC ANESTHESIA EA ADD 15 MINS: Performed by: INTERNAL MEDICINE

## 2024-03-07 PROCEDURE — 37000008 HC ANESTHESIA 1ST 15 MINUTES: Performed by: INTERNAL MEDICINE

## 2024-03-07 RX ORDER — PROPOFOL 10 MG/ML
VIAL (ML) INTRAVENOUS
Status: DISCONTINUED
Start: 2024-03-07 | End: 2024-03-07 | Stop reason: HOSPADM

## 2024-03-07 RX ORDER — PROPOFOL 10 MG/ML
VIAL (ML) INTRAVENOUS
Status: DISCONTINUED | OUTPATIENT
Start: 2024-03-07 | End: 2024-03-07

## 2024-03-07 RX ORDER — LORATADINE 10 MG/1
10 TABLET ORAL DAILY
COMMUNITY

## 2024-03-07 RX ORDER — LIDOCAINE HYDROCHLORIDE 20 MG/ML
INJECTION INTRAVENOUS
Status: DISCONTINUED | OUTPATIENT
Start: 2024-03-07 | End: 2024-03-07

## 2024-03-07 RX ORDER — LIDOCAINE HYDROCHLORIDE 20 MG/ML
INJECTION, SOLUTION EPIDURAL; INFILTRATION; INTRACAUDAL; PERINEURAL
Status: DISCONTINUED
Start: 2024-03-07 | End: 2024-03-07 | Stop reason: HOSPADM

## 2024-03-07 RX ADMIN — LIDOCAINE HYDROCHLORIDE 100 MG: 20 INJECTION INTRAVENOUS at 09:03

## 2024-03-07 RX ADMIN — PROPOFOL 20 MG: 10 INJECTION, EMULSION INTRAVENOUS at 09:03

## 2024-03-07 RX ADMIN — SODIUM CHLORIDE, SODIUM GLUCONATE, SODIUM ACETATE, POTASSIUM CHLORIDE AND MAGNESIUM CHLORIDE: 526; 502; 368; 37; 30 INJECTION, SOLUTION INTRAVENOUS at 09:03

## 2024-03-07 RX ADMIN — PROPOFOL 30 MG: 10 INJECTION, EMULSION INTRAVENOUS at 09:03

## 2024-03-07 RX ADMIN — PROPOFOL 10 MG: 10 INJECTION, EMULSION INTRAVENOUS at 09:03

## 2024-03-07 NOTE — H&P
[facility] Endoscopy  Gastroenterology  H&P    Patient Name: Lizandro Martinez  MRN: [id number]  Admission Date: 3/7/2024  Code Status: Prior    Attending Provider: Presley Benjamin MD   Primary Care Physician: Edward, [facility]  Principal Problem:<principal problem not specified>    Subjective:     History of Present Illness: 89 year old male with multiple comorbid conditions here for outpatient colonoscopy given previous colonoscopy done with Dr. Minor for hematochezia noted cecum polyp that was not removed given fair prep.  Patient has a history of colonic AVMs noted on endoscopy with Dr. Santillan, with subsequent ulcer formation and active bleeding after APC treatment that required the repeat endoscopy with Dr. Minor in 2023.    Past Medical History:   Diagnosis Date    A-fib     CHF (congestive heart failure)     Chronic pain     Hypercholesterolemia     Stroke        Past Surgical History:   Procedure Laterality Date    COLONOSCOPY N/A 8/16/2023    Procedure: COLON;  Surgeon: Jennifer Santillan III, MD;  Location: [facility] ENDOSCOPY;  Service: Gastroenterology;  Laterality: N/A;    COLONOSCOPY N/A 8/16/2023    Procedure: COLONOSCOPY, WITH HEMORRHAGE CONTROL;  Surgeon: Jennifer Santillan III, MD;  Location: [facility] ENDOSCOPY;  Service: Gastroenterology;  Laterality: N/A;    COLONOSCOPY N/A 8/23/2023    Procedure: COLON;  Surgeon: Ton Minor MD;  Location: [facility] ENDOSCOPY;  Service: Gastroenterology;  Laterality: N/A;    COLONOSCOPY  8/23/2023    Procedure: COLONOSCOPY, WITH HEMORRHAGE CONTROL;  Surgeon: Ton Minor MD;  Location: [facility] ENDOSCOPY;  Service: Gastroenterology;;  4 cc epi    COLONOSCOPY, WITH POLYPECTOMY USING SNARE N/A 8/16/2023    Procedure: COLONOSCOPY, WITH POLYPECTOMY USING SNARE;  Surgeon: Jennifer Santillan III, MD;  Location: [facility] ENDOSCOPY;  Service: Gastroenterology;  Laterality: N/A;    FRACTURE SURGERY      TIBIA FRACTURE SURGERY Right         Review of patient's allergies indicates:   Allergen Reactions    Colestipol     Meloxicam     Rosuvastatin     Simvastatin     Statins-hmg-coa reductase inhibitors     Tramadol      Family History       Problem Relation (Age of Onset)    COPD Mother    Cancer Mother    Dementia Father    Heart disease Mother    Hyperlipidemia Mother    Hypertension Mother          Tobacco Use    Smoking status: Never    Smokeless tobacco: Never   Substance and Sexual Activity    Alcohol use: Yes     Comment: Has cut down from 6 to a couple of beer now    Drug use: Never    Sexual activity: Not Currently     Review of Systems   Constitutional:  Negative for chills and fever.   HENT:  Negative for mouth sores.    Eyes:  Negative for visual disturbance.   Respiratory:  Negative for cough and shortness of breath.    Cardiovascular:  Negative for chest pain.   Gastrointestinal:  Negative for abdominal pain, constipation, diarrhea, nausea and vomiting.   Endocrine: Negative for cold intolerance and heat intolerance.   Genitourinary:  Negative for frequency and urgency.   Musculoskeletal:  Negative for back pain.   Skin:  Negative for rash.   Neurological:  Negative for headaches.   Psychiatric/Behavioral:  Negative for agitation and behavioral problems.      Objective:     Vital Signs (Most Recent):  Temp: 97.5 °F (36.4 °C) (03/07/24 0827)  Pulse: (!) 56 (03/07/24 0827)  Resp: 18 (03/07/24 0827)  BP: (!) 142/94 (03/07/24 0827)  SpO2: 98 % (03/07/24 0827) Vital Signs (24h Range):  Temp:  [97.5 °F (36.4 °C)] 97.5 °F (36.4 °C)  Pulse:  [56] 56  Resp:  [18] 18  SpO2:  [98 %] 98 %  BP: (142)/(94) 142/94        There is no height or weight on file to calculate BMI.    No intake or output data in the 24 hours ending 03/07/24 0829    Lines/Drains/Airways       Peripheral Intravenous Line  Duration                  Peripheral IV - Single Lumen 20 G Anterior;Right Forearm -- days                    Physical Exam  Constitutional:        Appearance: He is well-developed.      Comments: Chronically ill appearance    HENT:      Head: Normocephalic and atraumatic.      Nose: Nose normal.   Eyes:      General: No scleral icterus.        Right eye: No discharge.         Left eye: No discharge.      Conjunctiva/sclera: Conjunctivae normal.   Cardiovascular:      Rate and Rhythm: Normal rate and regular rhythm.      Heart sounds: No murmur heard.     No friction rub.   Pulmonary:      Effort: Pulmonary effort is normal. No respiratory distress.      Breath sounds: Normal breath sounds. No wheezing.   Abdominal:      General: Bowel sounds are normal. There is no distension.      Palpations: Abdomen is soft.      Tenderness: There is no abdominal tenderness.   Musculoskeletal:      Cervical back: Normal range of motion and neck supple.   Skin:     General: Skin is warm.   Neurological:      Mental Status: He is alert and oriented to person, place, and time.   Psychiatric:         Behavior: Behavior normal.         Significant Labs:  All pertinent lab results from the last 24 hours have been reviewed.    Significant Imaging:  Imaging results within the past 24 hours have been reviewed.    Assessment/Plan:     There are no hospital problems to display for this patient.    Procedure risks and benefits were discussed in detail today and an opportunity to answer patient/family questions was provided.       Presley Benjamin MD  Gastroenterology  [facility]

## 2024-03-07 NOTE — PROVATION PATIENT INSTRUCTIONS
Discharge Summary/Instructions after an Endoscopic Procedure  Patient Name: Lizandro Martinez  Patient MRN: [id number]  Patient YOB: 1935 Thursday, March 7, 2024  Presley Benjamin MD  Dear patient,  As a result of recent federal legislation (The Federal Cures Act), you may   receive lab or pathology results from your procedure in your [facility]   account before your physician is able to contact you. Your physician or   their representative will relay the results to you with their   recommendations at their soonest availability.  Thank you,  RESTRICTIONS:  During your procedure today, you received medications for sedation.  These   medications may affect your judgment, balance and coordination.  Therefore,   for 24 hours, you have the following restrictions:   - DO NOT drive a car, operate machinery, make legal/financial decisions,   sign important papers or drink alcohol.    ACTIVITY:  Today: no heavy lifting, straining or running due to procedural   sedation/anesthesia.  The following day: return to full activity including work.  DIET:  Eat and drink normally unless instructed otherwise.     TREATMENT FOR COMMON SIDE EFFECTS:  - Mild abdominal pain, nausea, belching, bloating or excessive gas:  rest,   eat lightly and use a heating pad.  - Sore Throat: treat with throat lozenges and/or gargle with warm salt   water.  - Because air was used during the procedure, expelling large amounts of air   from your rectum or belching is normal.  - If a bowel prep was taken, you may not have a bowel movement for 1-3 days.    This is normal.  SYMPTOMS TO WATCH FOR AND REPORT TO YOUR PHYSICIAN:  1. Abdominal pain or bloating, other than gas cramps.  2. Chest pain.  3. Back pain.  4. Signs of infection such as: chills or fever occurring within 24 hours   after the procedure.  5. Rectal bleeding, which would show as bright red, maroon, or black stools.   (A tablespoon of blood from the rectum is not serious, especially  if   hemorrhoids are present.)  6. Vomiting.  7. Weakness or dizziness.  GO DIRECTLY TO THE NEAREST EMERGENCY ROOM IF YOU HAVE ANY OF THE FOLLOWING:      Difficulty breathing              Chills and/or fever over 101 F   Persistent vomiting and/or vomiting blood   Severe abdominal pain   Severe chest pain   Black, tarry stools   Bleeding- more than one tablespoon   Any other symptom or condition that you feel may need urgent attention  Your doctor recommends these additional instructions:  If any biopsies were taken, your doctors clinic will contact you in 1 to 2   weeks with any results.  - Discharge patient to home (with escort).   - Advance diet as tolerated today.   - Await pathology results.   - Repeat colonoscopy date to be determined after pending pathology results   are reviewed for surveillance based on pathology results, if benign, then   PRN given age.   - The findings and recommendations were discussed with the patient.   - The findings and recommendations were discussed with the patient's   family.  For questions, problems or results please call your physician - Presley Benjamin MD at Work:  [phone].  [facility], EMERGENCY ROOM PHONE NUMBER: [phone]  IF A COMPLICATION OR EMERGENCY SITUATION ARISES AND YOU ARE UNABLE TO REACH   YOUR PHYSICIAN - GO DIRECTLY TO THE EMERGENCY ROOM.  MD Presley Dawson MD  3/7/2024 10:01:15 AM  This report has been verified and signed electronically.  Dear patient,  As a result of recent federal legislation (The Federal Cures Act), you may   receive lab or pathology results from your procedure in your [facility]   account before your physician is able to contact you. Your physician or   their representative will relay the results to you with their   recommendations at their soonest availability.  Thank you,  PROVATION

## 2024-03-07 NOTE — TRANSFER OF CARE
Anesthesia Transfer of Care Note    Patient: Lizandro Martinez    Procedure(s) Performed: Procedure(s) (LRB):  COLON (N/A)  COLONOSCOPY, THROUGH STOMA, WITH DIRECTED SUBMUCOSAL INJECTION  COLONOSCOPY, WITH POLYPECTOMY USING SNARE    Patient location: GI    Anesthesia Type: general    Transport from OR: Transported from OR on room air with adequate spontaneous ventilation    Post pain: adequate analgesia    Post assessment: no apparent anesthetic complications and tolerated procedure well    Post vital signs: stable    Level of consciousness: sedated and responds to stimulation    Nausea/Vomiting: no nausea/vomiting    Complications: none    Transfer of care protocol was followed      Last vitals: Visit Vitals  /67   Pulse 61   Temp 36.4 °C (97.5 °F)   Resp 16   SpO2 95%

## 2024-03-07 NOTE — ANESTHESIA POSTPROCEDURE EVALUATION
Anesthesia Post Evaluation    Patient: Lizandro Martinez    Procedure(s) Performed: Procedure(s) (LRB):  COLON (N/A)  COLONOSCOPY, THROUGH STOMA, WITH DIRECTED SUBMUCOSAL INJECTION  COLONOSCOPY, WITH POLYPECTOMY USING SNARE    Final Anesthesia Type: general (-TIVA Ayad AW)      Patient location during evaluation: GI PACU  Patient participation: Yes- Able to Participate  Level of consciousness: awake and alert, awake and oriented  Post-procedure vital signs: reviewed and stable  Pain management: adequate  Airway patency: patent    PONV status at discharge: No PONV  Anesthetic complications: no      Cardiovascular status: blood pressure returned to baseline, hemodynamically stable and stable  Respiratory status: unassisted, spontaneous ventilation and room air  Hydration status: euvolemic  Follow-up not needed.              Vitals Value Taken Time   /67 03/07/24 0955   Temp ** 03/07/24 1007   Pulse 61 03/07/24 0955   Resp 16 03/07/24 0955   SpO2 95 % 03/07/24 0955         No case tracking events are documented in the log.      Pain/[place] Score: [place] Score: 9 (3/7/2024  9:50 AM)

## 2024-03-08 VITALS
OXYGEN SATURATION: 97 % | HEART RATE: 63 BPM | DIASTOLIC BLOOD PRESSURE: 87 MMHG | RESPIRATION RATE: 16 BRPM | TEMPERATURE: 98 F | SYSTOLIC BLOOD PRESSURE: 151 MMHG

## 2024-03-08 LAB — PSYCHE PATHOLOGY RESULT: NORMAL

## 2024-10-20 ENCOUNTER — HOSPITAL ENCOUNTER (EMERGENCY)
Facility: HOSPITAL | Age: 89
Discharge: HOME | End: 2024-10-20
Attending: INTERNAL MEDICINE
Payer: OTHER GOVERNMENT

## 2024-10-20 VITALS
HEART RATE: 79 BPM | RESPIRATION RATE: 20 BRPM | OXYGEN SATURATION: 96 % | BODY MASS INDEX: 27.32 KG/M2 | SYSTOLIC BLOOD PRESSURE: 157 MMHG | TEMPERATURE: 98 F | DIASTOLIC BLOOD PRESSURE: 79 MMHG | WEIGHT: 170 LBS | HEIGHT: 66 IN

## 2024-10-20 DIAGNOSIS — M25.531 WRIST PAIN, ACUTE, RIGHT: ICD-10-CM

## 2024-10-20 PROCEDURE — 63600175 PHARM REV CODE 636 W HCPCS: Performed by: INTERNAL MEDICINE

## 2024-10-20 PROCEDURE — 99284 EMERGENCY DEPT VISIT MOD MDM: CPT | Mod: 25

## 2024-10-20 PROCEDURE — 96372 THER/PROPH/DIAG INJ SC/IM: CPT | Performed by: INTERNAL MEDICINE

## 2024-10-20 RX ORDER — DEXAMETHASONE SODIUM PHOSPHATE 4 MG/ML
8 INJECTION, SOLUTION INTRA-ARTICULAR; INTRALESIONAL; INTRAMUSCULAR; INTRAVENOUS; SOFT TISSUE
Status: COMPLETED | OUTPATIENT
Start: 2024-10-20 | End: 2024-10-20

## 2024-10-20 RX ORDER — PANTOPRAZOLE SODIUM 40 MG/1
40 TABLET, DELAYED RELEASE ORAL DAILY
Qty: 30 TABLET | Refills: 0 | Status: SHIPPED | OUTPATIENT
Start: 2024-10-20 | End: 2024-11-19

## 2024-10-20 RX ORDER — PREDNISONE 20 MG/1
40 TABLET ORAL DAILY
Qty: 10 TABLET | Refills: 0 | Status: SHIPPED | OUTPATIENT
Start: 2024-10-20 | End: 2024-10-25

## 2024-10-20 RX ADMIN — DEXAMETHASONE SODIUM PHOSPHATE 8 MG: 4 INJECTION, SOLUTION INTRA-ARTICULAR; INTRALESIONAL; INTRAMUSCULAR; INTRAVENOUS; SOFT TISSUE at 06:10

## 2025-01-02 ENCOUNTER — HOSPITAL ENCOUNTER (EMERGENCY)
Facility: HOSPITAL | Age: 89
Discharge: HOME | End: 2025-01-02
Attending: EMERGENCY MEDICINE
Payer: OTHER GOVERNMENT

## 2025-01-02 VITALS
WEIGHT: 173 LBS | RESPIRATION RATE: 21 BRPM | OXYGEN SATURATION: 97 % | HEIGHT: 68 IN | TEMPERATURE: 97 F | DIASTOLIC BLOOD PRESSURE: 71 MMHG | HEART RATE: 63 BPM | BODY MASS INDEX: 26.22 KG/M2 | SYSTOLIC BLOOD PRESSURE: 152 MMHG

## 2025-01-02 DIAGNOSIS — R41.82 ALTERED MENTAL STATUS: Primary | ICD-10-CM

## 2025-01-02 LAB
ALBUMIN SERPL-MCNC: 3.9 G/DL (ref 3.4–4.8)
ALBUMIN/GLOB SERPL: 1.1 RATIO (ref 1.1–2)
ALP SERPL-CCNC: 50 UNIT/L (ref 40–150)
ALT SERPL-CCNC: 19 UNIT/L (ref 0–55)
ANION GAP SERPL CALC-SCNC: 9 MEQ/L
AST SERPL-CCNC: 27 UNIT/L (ref 5–34)
BASOPHILS # BLD AUTO: 0.07 X10(3)/MCL
BASOPHILS NFR BLD AUTO: 0.9 %
BILIRUB SERPL-MCNC: 0.8 MG/DL
BILIRUB UR QL STRIP.AUTO: NEGATIVE
BNP BLD-MCNC: 400.9 PG/ML
BUN SERPL-MCNC: 17 MG/DL (ref 8.4–25.7)
CALCIUM SERPL-MCNC: 9.2 MG/DL (ref 8.8–10)
CHLORIDE SERPL-SCNC: 104 MMOL/L (ref 98–107)
CLARITY UR: CLEAR
CO2 SERPL-SCNC: 25 MMOL/L (ref 23–31)
COLOR UR AUTO: YELLOW
CREAT SERPL-MCNC: 1.23 MG/DL (ref 0.72–1.25)
CREAT/UREA NIT SERPL: 14
EOSINOPHIL # BLD AUTO: 0.25 X10(3)/MCL (ref 0–0.9)
EOSINOPHIL NFR BLD AUTO: 3.3 %
ERYTHROCYTE [DISTWIDTH] IN BLOOD BY AUTOMATED COUNT: 15.1 % (ref 11.5–17)
GFR SERPLBLD CREATININE-BSD FMLA CKD-EPI: 57 ML/MIN/1.73/M2
GLOBULIN SER-MCNC: 3.5 GM/DL (ref 2.4–3.5)
GLUCOSE SERPL-MCNC: 134 MG/DL (ref 82–115)
GLUCOSE UR QL STRIP: NEGATIVE
HCT VFR BLD AUTO: 41.1 % (ref 42–52)
HGB BLD-MCNC: 13.5 G/DL (ref 14–18)
HGB UR QL STRIP: NEGATIVE
IMM GRANULOCYTES # BLD AUTO: 0.04 X10(3)/MCL (ref 0–0.04)
IMM GRANULOCYTES NFR BLD AUTO: 0.5 %
KETONES UR QL STRIP: NEGATIVE
LEUKOCYTE ESTERASE UR QL STRIP: NEGATIVE
LYMPHOCYTES # BLD AUTO: 1.7 X10(3)/MCL (ref 0.6–4.6)
LYMPHOCYTES NFR BLD AUTO: 22.4 %
MCH RBC QN AUTO: 30 PG (ref 27–31)
MCHC RBC AUTO-ENTMCNC: 32.8 G/DL (ref 33–36)
MCV RBC AUTO: 91.3 FL (ref 80–94)
MONOCYTES # BLD AUTO: 0.66 X10(3)/MCL (ref 0.1–1.3)
MONOCYTES NFR BLD AUTO: 8.7 %
NEUTROPHILS # BLD AUTO: 4.88 X10(3)/MCL (ref 2.1–9.2)
NEUTROPHILS NFR BLD AUTO: 64.2 %
NITRITE UR QL STRIP: NEGATIVE
OHS QRS DURATION: 156 MS
OHS QTC CALCULATION: 480 MS
PH UR STRIP: 5 [PH]
PLATELET # BLD AUTO: 246 X10(3)/MCL (ref 130–400)
PMV BLD AUTO: 9.4 FL (ref 7.4–10.4)
POTASSIUM SERPL-SCNC: 4.1 MMOL/L (ref 3.5–5.1)
PROT SERPL-MCNC: 7.4 GM/DL (ref 5.8–7.6)
PROT UR QL STRIP: NEGATIVE
RBC # BLD AUTO: 4.5 X10(6)/MCL (ref 4.7–6.1)
SODIUM SERPL-SCNC: 138 MMOL/L (ref 136–145)
SP GR UR STRIP.AUTO: <=1.005 (ref 1–1.03)
UROBILINOGEN UR STRIP-ACNC: 0.2
WBC # BLD AUTO: 7.6 X10(3)/MCL (ref 4.5–11.5)

## 2025-01-02 PROCEDURE — 83880 ASSAY OF NATRIURETIC PEPTIDE: CPT | Performed by: EMERGENCY MEDICINE

## 2025-01-02 PROCEDURE — 93005 ELECTROCARDIOGRAM TRACING: CPT

## 2025-01-02 PROCEDURE — 93010 ELECTROCARDIOGRAM REPORT: CPT | Mod: ,,, | Performed by: INTERNAL MEDICINE

## 2025-01-02 PROCEDURE — 85025 COMPLETE CBC W/AUTO DIFF WBC: CPT | Performed by: EMERGENCY MEDICINE

## 2025-01-02 PROCEDURE — 81003 URINALYSIS AUTO W/O SCOPE: CPT | Performed by: EMERGENCY MEDICINE

## 2025-01-02 PROCEDURE — 80053 COMPREHEN METABOLIC PANEL: CPT | Performed by: EMERGENCY MEDICINE

## 2025-01-02 PROCEDURE — 99284 EMERGENCY DEPT VISIT MOD MDM: CPT | Mod: 25

## 2025-01-02 PROCEDURE — 25500020 PHARM REV CODE 255: Performed by: EMERGENCY MEDICINE

## 2025-01-02 RX ORDER — IOPAMIDOL 755 MG/ML
100 INJECTION, SOLUTION INTRAVASCULAR
Status: COMPLETED | OUTPATIENT
Start: 2025-01-02 | End: 2025-01-02

## 2025-01-02 RX ADMIN — IOPAMIDOL 100 ML: 755 INJECTION, SOLUTION INTRAVENOUS at 03:01

## 2025-01-02 NOTE — ED PROVIDER NOTES
ED PROVIDER NOTE  1/2/2025    CHIEF COMPLAINT:   Chief Complaint   Patient presents with    Altered Mental Status     States yesterday morning he had trouble speaking to a friend. Pt states difficultly forming his sentences lasted a few hours. Pt is disoriented to time in triage.        HISTORY OF PRESENT ILLNESS:   Lizandro Martinez is a 89 y.o. male who presents with chief complaint Altered mental status.  Patient reports that yesterday the day before he had an episode of confusion stating that he was unable to find the right words to talk to somebody that was at his house.  He states this lasted for about a couple hours and then spontaneously resolved.  He states that he had not noticed any numbness or weakness to 1 side of his body or have any other symptoms when this happened.  Denies headache, neck pain or stiffness, fever, numbness or weakness in extremities, changes in vision or hearing.    The history is provided by the patient.         REVIEW OF SYSTEMS: as noted in the HPI.  NURSING NOTES REVIEWED      PAST MEDICAL/SURGICAL HISTORY:   Past Medical History:   Diagnosis Date    A-fib     CHF (congestive heart failure)     Chronic pain     Hypercholesterolemia     Stroke       Past Surgical History:   Procedure Laterality Date    COLONOSCOPY N/A 8/16/2023    Procedure: COLON;  Surgeon: Jennifer Santillan III, MD;  Location: [facility] ENDOSCOPY;  Service: Gastroenterology;  Laterality: N/A;    COLONOSCOPY N/A 8/16/2023    Procedure: COLONOSCOPY, WITH HEMORRHAGE CONTROL;  Surgeon: Jennifer Santillan III, MD;  Location: [facility] ENDOSCOPY;  Service: Gastroenterology;  Laterality: N/A;    COLONOSCOPY N/A 8/23/2023    Procedure: COLON;  Surgeon: Ton Minor MD;  Location: [facility] ENDOSCOPY;  Service: Gastroenterology;  Laterality: N/A;    COLONOSCOPY  8/23/2023    Procedure: COLONOSCOPY, WITH HEMORRHAGE CONTROL;  Surgeon: Ton Minor MD;  Location: [facility] ENDOSCOPY;  Service: Gastroenterology;;  4 cc epi     COLONOSCOPY N/A 3/7/2024    Procedure: COLON;  Surgeon: Presley Benjamin MD;  Location: [facility] ENDOSCOPY;  Service: Gastroenterology;  Laterality: N/A;    COLONOSCOPY, THROUGH STOMA, WITH DIRECTED SUBMUCOSAL INJECTION  3/7/2024    Procedure: COLONOSCOPY, THROUGH STOMA, WITH DIRECTED SUBMUCOSAL INJECTION;  Surgeon: Presley Benjamin MD;  Location: [facility] ENDOSCOPY;  Service: Gastroenterology;;    COLONOSCOPY, WITH POLYPECTOMY USING SNARE N/A 8/16/2023    Procedure: COLONOSCOPY, WITH POLYPECTOMY USING SNARE;  Surgeon: Jennifer Santillan III, MD;  Location: [facility] ENDOSCOPY;  Service: Gastroenterology;  Laterality: N/A;    COLONOSCOPY, WITH POLYPECTOMY USING SNARE  3/7/2024    Procedure: COLONOSCOPY, WITH POLYPECTOMY USING SNARE;  Surgeon: Presley Benjamin MD;  Location: [facility] ENDOSCOPY;  Service: Gastroenterology;;    FRACTURE SURGERY      TIBIA FRACTURE SURGERY Right        FAMILY HISTORY:   Family History   Problem Relation Name Age of Onset    Hypertension Mother      Heart disease Mother      Hyperlipidemia Mother      Cancer Mother      COPD Mother      Dementia Father         SOCIAL HISTORY:   Social History     Tobacco Use    Smoking status: Never    Smokeless tobacco: Never   Substance Use Topics    Alcohol use: Not Currently     Comment: Has cut down from 6 to a couple of beer now    Drug use: Yes     Types: Hydrocodone       ALLERGIES:   Review of patient's allergies indicates:   Allergen Reactions    Colestipol     Meloxicam     Rosuvastatin     Simvastatin     Statins-hmg-coa reductase inhibitors     Tramadol        PHYSICAL EXAM:  Initial Vitals   BP Pulse Resp Temp SpO2   01/02/25 1301 01/02/25 1301 01/02/25 1301 01/02/25 1301 01/02/25 1304   (!) 144/83 81 13 97 °F (36.1 °C) 97 %      MAP       --                Physical Exam    Nursing note and vitals reviewed.  Constitutional: He appears well-developed and well-nourished. No distress.   HENT:   Head: Normocephalic and atraumatic.   Nose:  Nose normal. Mouth/Throat: Oropharynx is clear and moist and mucous membranes are normal.   Eyes: Conjunctivae and EOM are normal. Pupils are equal, round, and reactive to light.   Neck: Neck supple. No tracheal deviation present.   Cardiovascular:  Normal rate, normal heart sounds, intact distal pulses and normal pulses. An irregularly irregular rhythm present.           Pulmonary/Chest: Effort normal and breath sounds normal. No respiratory distress.   Abdominal: Abdomen is soft. There is no abdominal tenderness. There is no rebound and no guarding.   Musculoskeletal:         General: Normal range of motion.      Cervical back: Neck supple.     Neurological: He is alert and oriented to person, place, and time. He has normal strength. No cranial nerve deficit or sensory deficit. GCS eye subscore is 4. GCS verbal subscore is 5. GCS motor subscore is 6.   CN II-XII intact. Moves all extremities. No gross sensory or motor deficits.   Skin: Skin is warm, dry and intact.   Psychiatric: He has a normal mood and affect. His speech is normal and behavior is normal. Judgment and thought content normal. Cognition and memory are normal.         RESULTS:  Labs Reviewed   B-TYPE NATRIURETIC PEPTIDE - Abnormal       Result Value    Natriuretic Peptide 400.9 (*)    COMPREHENSIVE METABOLIC PANEL - Abnormal    Sodium 138      Potassium 4.1      Chloride 104      CO2 25      Glucose 134 (*)     Blood Urea Nitrogen 17.0      Creatinine 1.23      Calcium 9.2      Protein Total 7.4      Albumin 3.9      Globulin 3.5      Albumin/Globulin Ratio 1.1      Bilirubin Total 0.8      ALP 50      ALT 19      AST 27      eGFR 57      Anion Gap 9.0      BUN/Creatinine Ratio 14     CBC WITH DIFFERENTIAL - Abnormal    WBC 7.60      RBC 4.50 (*)     Hgb 13.5 (*)     Hct 41.1 (*)     MCV 91.3      MCH 30.0      MCHC 32.8 (*)     RDW 15.1      Platelet 246      MPV 9.4      Neut % 64.2      Lymph % 22.4      Mono % 8.7      Eos % 3.3      Basophil %  0.9      Lymph # 1.70      Neut # 4.88      Mono # 0.66      Eos # 0.25      Baso # 0.07      IG# 0.04      IG% 0.5     URINALYSIS, REFLEX TO URINE CULTURE - Normal    Color, UA Yellow      Appearance, UA Clear      Specific Gravity, UA <=1.005      pH, UA 5.0      Protein, UA Negative      Glucose, UA Negative      Ketones, UA Negative      Blood, UA Negative      Bilirubin, UA Negative      Urobilinogen, UA 0.2      Nitrites, UA Negative      Leukocyte Esterase, UA Negative     CBC W/ AUTO DIFFERENTIAL    Narrative:     The following orders were created for panel order CBC auto differential.  Procedure                               Abnormality         Status                     ---------                               -----------         ------                     CBC with Differential[[id number]]       Abnormal            Final result                 Please view results for these tests on the individual orders.     Imaging Results              CTA Head and Neck (xpd) (Final result)  Result time 01/02/25 15:53:07      Final result by eCasar Pang MD (01/02/25 15:53:07)                   Impression:      1. Dominant right vertebral artery with the diminutive hypoplastic left vertebral artery  2. Mildly dominant left A1 segment with symmetric A2 and A3 segments and patent anterior communicating artery  3. Nonvisualization of the posterior communicating arteries bilaterally.  4. Localized plaque formation at the carotid bulb with the narrowing estimated at less than 50% bilaterally  5. Mild scattered plaque formation at the cavernous portions of the internal carotid arteries with stenosis estimated at less than 30-40% bilaterally      Electronically signed by: Ceasar Pang  Date:    01/02/2025  Time:    15:53               Narrative:    EXAMINATION:  CTA HEAD and NECK:    CLINICAL HISTORY:  , Stroke/TIA, determine embolic source;    TECHNIQUE:  PATIENT RADIATION DOSE: DLP(mGycm) 159 a    As per PQRS measures,  all CT scans at this facility used dose modulation, iterative reconstruction, and/or weight based dose adjustment when appropriate to reduce radiation dose to as low as reasonably achievable.    COMPARISON:  None available    FINDINGS:  Serial axial images were obtained with the administration of IV contrast. Additional sagittal and coronal reconstructions were performed. 3-D angiographic reconstructions were performed of the head. NASCET criteria was utilized. Type 2 aortic arch    Common carotid arteries: Symmetric in size and caliber.  Localized hard and soft plaque formation at the carotid bulbs bilaterally with focal 50% narrowing bilaterally    Internal carotid arteries: Symmetric in size and caliber.  Scattered plaque formation at the cavernous portion of the carotid arteries bilaterally with stenosis estimated at less than 30-40 % bilaterally.    Anterior cerebral arteries: Dominant left A1 segment with patent anterior communicating artery and symmetric A2 and A3 segments    Vertebral arteries: Dominant right vertebral artery considerably diminutive left vertebral artery with a small amount of flow/contrast identified at the mid left and again at the distal left vertebral artery only.  No significant flow is seen proximally.  There is diminutive flow identified distally at the basilar artery junction    Middle cerebral arteries: Symmetric in size and caliber    Basilar artery: Normal in size and caliber    Posterior cerebral arteries: Symmetric in size and caliber    Posterior communicating arteries: Mild identified with certainty bilaterally                                       CT Head Without Contrast (Final result)  Result time 01/02/25 13:47:46      Final result by Ceasar Pang MD (01/02/25 13:47:46)                   Impression:      1. No acute intracranial abnormality identified  2. Generalized cerebral and cerebellar atrophy  3. Findings and other details as above      Electronically signed  by: Ceasar Pang  Date:    01/02/2025  Time:    13:47               Narrative:    EXAMINATION:  CT HEAD WITHOUT CONTRAST    CLINICAL HISTORY:  Mental status change, unknown cause;, .    TECHNIQUE:  PATIENT RADIATION DOSE: DLP(mGycm) 977    As per PQRS measures, all CT scans at this facility used dose modulation, iterative reconstruction, and/or weight based dose adjustment when appropriate to reduce radiation dose to as low as reasonably achievable.    COMPARISON:  07/25/2014    FINDINGS:  Serial axial images were obtained of the head without the administration of IV contrast. Both brain and bone parenchymal windows were obtained.  Additional coronal and sagittal reconstructions were obtained.  Ventricles, cisterns, and sulci are prominent in size.  There is no evidence of intracranial hemorrhage, midline shift, mass effect, or abnormal extra-axial fluid collections.  Scattered hypodensities are seen within the periventricular white matter suspicious for small vessel ischemic changes.  Intracranial atherosclerosis identified.  There is a parenchymal defect at the lower left cerebellum compatible with old infarct/encephalomalacia measuring up to 2 cm.  Cerebellar tonsils extend caudally to the level of the foramen magnum.  There is beam hardening artifact at the skull base.  There is mild mucosal thickening at the left ethmoid sinus.  Mastoid air cells are aerated bilaterally.  External auditory canals demonstrate defects bilaterally suggesting cerumen.  Clinical correlation is indicated.  Bony structures are osteopenic.                                      PROCEDURES:  Procedures    ECG:  EKG Readings: (Independently Interpreted)   Initial Reading: No STEMI. Rhythm: Atrial Fibrillation. Heart Rate: 60. Axis: Left Axis Deviation.       ED COURSE AND MEDICAL DECISION MAKING:  Medications   iopamidoL (ISOVUE-370) injection 100 mL (100 mLs Intravenous Given 1/2/25 1518)     ED Course as of 01/02/25 1616   Thu Jan 02,  2025 1322 WBC: 7.60 [IB]   1322 Hemoglobin(!): 13.5 [IB]   1322 Platelet Count: 246 [IB]   1433 Creatinine: 1.23 [IB]   1433 BNP(!): 400.9 [IB]      ED Course User Index  [IB] Jono Lake,         Medical Decision Making  89-year-old male who presents with difficulty with word finding that happened yesterday the day before.  He has no focal neurologic deficits on examination.  Differential diagnosis includes TIA, stroke, metabolic encephalopathy.  He states that he does have a history of dementia come and states that he just had trouble finding the right words.  CT head shows no acute intracranial process.  ECG shows atrial fibrillation, patient refuses to take blood thinner stating he has had bleeding issues with them in the past.  Case was discussed with internal medicine who recommended getting CTA head and neck to evaluate for any carotid stenosis and this was unremarkable.  [facility] instructed him to discuss with his PCP treatment for his atrial fibrillation with regards to trying to reduce his stroke risk.  He states he is not a candidate for the Watchman procedure because he has some device at home that he uses that would prevent him from being a candidate?  Given strict ED return precautions. I have spoken with the patient and/or caregivers. I have explained the patient's condition, diagnoses and treatment plan based on the information available to me at this time. I have answered the patient's and/or caregiver's questions and addressed any concerns. The patient and/or caregivers have as good an understanding of the patient's diagnosis, condition and treatment plan as can be expected at this point. The vital signs have been stable. The patient's condition is stable and appropriate for discharge from the emergency department.     The patient will pursue further outpatient evaluation with the primary care physician or other designated or consulting physician as  outlined in the discharge instructions. The patient and/or caregivers are agreeable to this plan of care and follow-up instructions have been explained in detail. The patient and/or caregivers have received these instructions in written format and have expressed an understanding of the discharge instructions. The patient and/or caregivers are aware that any significant change in condition or worsening of symptoms should prompt an immediate return to this or the closest emergency department or a call to 911.    Amount and/or Complexity of Data Reviewed  Labs: ordered. Decision-making details documented in ED Course.  Radiology: ordered.  ECG/medicine tests: ordered and independent interpretation performed.  Discussion of management or test interpretation with external provider(s): Case discussed with internal medicine who feels that we do not have much to offer since patient refuses to take anticoagulants to manage his atrial fibrillation, recommends getting CTA head and neck to rule out stenosis as a possible etiology for his TIA.    Risk  Prescription drug management.  Decision regarding hospitalization.        CLINICAL IMPRESSION:  1. Altered mental status        DISPOSITION:   ED Disposition Condition    Discharge Stable            ED Prescriptions    None       Follow-up Information       Follow up With Specialties Details Why Contact Info    [place]  Schedule an appointment as soon as possible for a visit   [address]  [facility] [address]  [phone]      [facility] - Emergency Dept Emergency Medicine  If symptoms worsen [address]  [facility] [address]  [phone]               Jono Lake,   01/02/25 1543

## (undated) DEVICE — COLLECTION SPECIMEN NEPTUNE

## (undated) DEVICE — SOL IRRI STRL WATER 1000ML

## (undated) DEVICE — ADAPTER DUAL NSL LUER M-M 7FT

## (undated) DEVICE — BLOCK BLOX BITE DENT RIM 54FR

## (undated) DEVICE — SNARE ELECTROSURGICAL

## (undated) DEVICE — ITEM DISCONTINUED

## (undated) DEVICE — TIP SUCTION YANKAUER

## (undated) DEVICE — PROBE CIRCLER FIRE APC

## (undated) DEVICE — UNDERPAD DISPOSABLE 30X30IN

## (undated) DEVICE — KIT CANIST SUCTION 1200CC

## (undated) DEVICE — ELECTRODE REM PLYHSV RETURN 9

## (undated) DEVICE — KIT SURGICAL COLON .25 1.1OZ

## (undated) DEVICE — Device

## (undated) DEVICE — CLIP RESOLUTION 360 ULT 235CM

## (undated) DEVICE — FIRE PROBES STRAIGHT